# Patient Record
Sex: MALE | Race: WHITE | NOT HISPANIC OR LATINO | Employment: FULL TIME | URBAN - METROPOLITAN AREA
[De-identification: names, ages, dates, MRNs, and addresses within clinical notes are randomized per-mention and may not be internally consistent; named-entity substitution may affect disease eponyms.]

---

## 2017-01-06 ENCOUNTER — OFFICE VISIT (OUTPATIENT)
Dept: FAMILY MEDICINE | Facility: CLINIC | Age: 47
End: 2017-01-06
Payer: COMMERCIAL

## 2017-01-06 VITALS
DIASTOLIC BLOOD PRESSURE: 80 MMHG | HEART RATE: 109 BPM | WEIGHT: 315 LBS | HEIGHT: 74 IN | BODY MASS INDEX: 40.43 KG/M2 | TEMPERATURE: 99.1 F | SYSTOLIC BLOOD PRESSURE: 126 MMHG

## 2017-01-06 DIAGNOSIS — E66.01 MORBID OBESITY DUE TO EXCESS CALORIES (H): Primary | ICD-10-CM

## 2017-01-06 DIAGNOSIS — J45.20 MILD INTERMITTENT ASTHMA WITHOUT COMPLICATION: ICD-10-CM

## 2017-01-06 DIAGNOSIS — T75.3XXA MOTION SICKNESS, INITIAL ENCOUNTER: ICD-10-CM

## 2017-01-06 DIAGNOSIS — R05.9 COUGH: ICD-10-CM

## 2017-01-06 PROCEDURE — 99214 OFFICE O/P EST MOD 30 MIN: CPT | Performed by: FAMILY MEDICINE

## 2017-01-06 RX ORDER — IPRATROPIUM BROMIDE AND ALBUTEROL SULFATE 2.5; .5 MG/3ML; MG/3ML
3 SOLUTION RESPIRATORY (INHALATION) EVERY 6 HOURS PRN
Qty: 30 VIAL | Refills: 0 | Status: SHIPPED | OUTPATIENT
Start: 2017-01-06 | End: 2018-05-03

## 2017-01-06 RX ORDER — ALBUTEROL SULFATE 90 UG/1
1-2 AEROSOL, METERED RESPIRATORY (INHALATION) EVERY 4 HOURS PRN
Qty: 3 INHALER | Refills: 0 | Status: SHIPPED | OUTPATIENT
Start: 2017-01-06 | End: 2018-05-03

## 2017-01-06 RX ORDER — SCOLOPAMINE TRANSDERMAL SYSTEM 1 MG/1
PATCH, EXTENDED RELEASE TRANSDERMAL
Qty: 4 PATCH | Refills: 3 | Status: SHIPPED
Start: 2017-01-06 | End: 2018-05-03

## 2017-01-06 NOTE — MR AVS SNAPSHOT
"              After Visit Summary   1/6/2017    Gonzales Melo    MRN: [id number]           Patient Information     Date Of Birth          1970        Visit Information        Provider Department      1/6/2017 11:15 AM Kandy Duff MD [facility]        Today's Diagnoses     Morbid obesity due to excess calories (H)    -  1     Mild intermittent asthma without complication         Cough         Motion sickness, initial encounter            Follow-ups after your visit        Future tests that were ordered for you today     Open Future Orders        Priority Expected Expires Ordered    Lipid panel reflex to direct LDL Routine  12/6/2017 1/6/2017    Glucose Routine  12/6/2017 1/6/2017    Hemoglobin A1c Routine  12/6/2017 1/6/2017            Who to contact     If you have questions or need follow up information about today's clinic visit or your schedule please contact [facility] directly at [phone].  Normal or non-critical lab and imaging results will be communicated to you by MyChart, letter or phone within 4 business days after the clinic has received the results. If you do not hear from us within 7 days, please contact the clinic through [employer] or phone. If you have a critical or abnormal lab result, we will notify you by phone as soon as possible.  Submit refill requests through [employer] or call your pharmacy and they will forward the refill request to us. Please allow 3 business days for your refill to be completed.          Additional Information About Your Visit        MyChart Information     [employer] lets you send messages to your doctor, view your test results, renew your prescriptions, schedule appointments and more. To sign up, go to [email or web address] . Click on \"Log in\" on the left side of the screen, which will take you to the Welcome page. Then click on \"Sign up Now\" on the right side of the page.     You will be asked to enter the access code listed " "below, as well as some personal information. Please follow the directions to create your username and password.     Your access code is: [email or web address]  Expires: 2017 11:36 AM     Your access code will  in 90 days. If you need help or a new code, please call your [facility] or [phone].        Care EveryWhere ID     This is your Care EveryWhere ID. This could be used by other organizations to access your [place] medical records  [id number]        Your Vitals Were     Pulse Temperature Height BMI (Body Mass Index)          109 99.1  F (37.3  C) (Oral) 6' 2\" (1.88 m) 42.48 kg/m2         Blood Pressure from Last 3 Encounters:   17 126/80   09/30/15 114/73   09/12/15 126/86    Weight from Last 3 Encounters:   17 331 lb (150.141 kg)   09/12/15 326 lb (147.873 kg)   14 268 lb (121.564 kg)              We Performed the Following     Asthma Action Plan (AAP)          Today's Medication Changes          These changes are accurate as of: 17 11:36 AM.  If you have any questions, ask your nurse or doctor.               Start taking these medicines.        Dose/Directions    scopolamine 72 hr patch   Commonly known as:  TRANSDERM   Used for:  Motion sickness, initial encounter   Started by:  Kandy Duff MD        Place 1 patch onto the skin every 72 hours.  Apply to hairless area behind one ear at least 4 hours before travel.  Remove old patch and change every 3 days.   Quantity:  4 patch   Refills:  3            Where to get your medicines      These medications were sent to [facility] [id number] [place] [address] - [address] AT [facility] [address]  [address], [facility] [address]     Phone:  [phone]    - albuterol 108 (90 BASE) MCG/ACT Inhaler  - ipratropium - albuterol 0.5 mg/2.5 mg/3 mL 0.5-2.5 (3) MG/3ML neb solution  - scopolamine 72 hr patch             Primary Care Provider Office Phone # Fax #    Kandy Duff -[phone] "       [facility] [address]  [facility] [address]        Thank you!     Thank you for choosing [facility]  for your care. Our goal is always to provide you with excellent care. Hearing back from our patients is one way we can continue to improve our services. Please take a few minutes to complete the written survey that you may receive in the mail after your visit with us. Thank you!             Your Updated Medication List - Protect others around you: Learn how to safely use, store and throw away your medicines at www.disposemymeds.org.          This list is accurate as of: 1/6/17 11:36 AM.  Always use your most recent med list.                   Brand Name Dispense Instructions for use    albuterol 108 (90 BASE) MCG/ACT Inhaler    PROAIR HFA/PROVENTIL HFA/VENTOLIN HFA    3 Inhaler    Inhale 1-2 puffs into the lungs every 4 hours as needed for shortness of breath / dyspnea or wheezing       ipratropium - albuterol 0.5 mg/2.5 mg/3 mL 0.5-2.5 (3) MG/3ML neb solution    DUONEB    30 vial    Take 1 vial (3 mLs) by nebulization every 6 hours as needed for shortness of breath / dyspnea       scopolamine 72 hr patch    TRANSDERM    4 patch    Place 1 patch onto the skin every 72 hours.  Apply to hairless area behind one ear at least 4 hours before travel.  Remove old patch and change every 3 days.

## 2017-01-06 NOTE — Clinical Note
My Asthma Action Plan  Name: Gonzales Melo   Date: 1/6/2017   My doctor: Kandy Duff   My clinic: [address] [address] [phone] My Control Medicine: none  My Rescue Medicine: Albuterol        Dose:    My Asthma Severity: intermittent  Avoid your asthma triggers: See attached        GREEN ZONE   Good Control    I feel good    No cough or wheeze    Can work, sleep and play without asthma symptoms       Take your asthma control medicine every day.    1. If exercise triggers your asthma, take Albuterol 2 puffs    15 minutes before exercise or sports, and    During exercise if you have asthma symptoms  2. Spacer to use with inhaler: yes - ask us if you do not have one             YELLOW ZONE Getting Worse  I have ANY of these:    I do not feel good    Cough or wheeze    Chest feels tight    Wake up at night   1. Keep taking your Green Zone medications  2. Start taking your rescue medicine:    every 20 minutes for up to 1 hour. Then every 4 hours for 24-48 hours.  3. If you stay in the Yellow Zone for more than 12-24 hours, contact your doctor.  4. If you do not return to the Green Zone in 12-24 hours or you get worse, start taking your oral steroid medicine if prescribed by your provider.           RED ZONE Medical Alert - Get Help  I have ANY of these:    I feel awful    Medicine is not helping    Breathing getting harder    Trouble walking or talking    Nose opens wide to breathe       1. Take your rescue medicine NOW  2. If your provider has prescribed an oral steroid medicine, start taking it NOW  3. Call your doctor NOW  4. If you are still in the Red Zone after 20 minutes and you have not reached your doctor:    Take your rescue medicine again and    Call 911 or go to the emergency room right away    See your regular doctor within 2 weeks of an Emergency Room or Urgent Care visit for follow-up treatment.        The above medication may be given  at school or day care?: N/A (Adult Patient)  Child can carry and use inhaler(s) at school with approval of school nurse?:  N/A (Adult Patient)    Electronically signed by: Blaire Rothman, January 6, 2017    Annual Reminders:  Meet with Asthma Educator,  Flu Shot in the Fall, consider Pneumonia Vaccination for patients with asthma (aged 19 and older).                                  Asthma Triggers  How To Control Things That Make Your Asthma Worse    Triggers are things that make your asthma worse.  Look at the list below to help you find your triggers and what you can do about them.  You can help prevent asthma flare-ups by staying away from your triggers.      Trigger                                                          What you can do   Cigarette Smoke  Tobacco smoke can make asthma worse. Do not allow smoking in your home, car or around you.  Be sure no one smokes at a child s day care or school.  If you smoke, ask your health care provider for ways to help you quick.  Ask family members to quit too.  Ask your health care provider for a referral to Quit plan to help you quit smoking, or call 9-557-458-PLAN.     Colds, Flu, Bronchitis  These are common triggers of asthma. Wash your hands often.  Don t touch your eyes, nose or mouth.  Get a flu shot every year.     Dust Mites  These are tiny bugs that live in cloth or carpet. They are too small to see. Wash sheets and blankets in hot water every week.   Encase pillows and mattress in dust mite proof covers.  Avoid having carpet if you can. If you have carpet, vacuum weekly.   Use a dust mask and HEPA vacuum.   Pollen and Outdoor Mold  Some people are allergic to trees, grass, or weed pollen, or molds. Try to keep your windows closed.  Limit time out doors when pollen count is high.   Ask you health care provider about taking medicine during allergy season.     Animal Dander  Some people are allergic to skin flakes, urine or saliva from pets with fur or  feathers. Keep pets with fur or feathers out of your home.    If you can t keep the pet outdoors, then keep the pet out of your bedroom.  Keep the bedroom door closed.  Keep pets off cloth furniture and away from stuffed toys.     Mice, Rats, and Cockroaches  Some people are allergic to the waste from these pets.   Cover food and garbage.  Clean up spills and food crumbs.  Store grease in the refrigerator.   Keep food out of the bedroom.   Indoor Mold  This can be a trigger if your home has high moisture Fix leaking faucets, pipes, or other sources of water.   Clean moldy surfaces.  Dehumidify basement if it is damp and smelly.   Smoke, Strong Odors, and Sprays  These can reduce air quality. Stay away from strong odors and sprays, such as perfume, powder, hair spray, paints, smoke incense, paints, cleaning products, candles and new carpet.   Exercise or Sports  Some people with asthma have this trigger. Be active!  Ask you doctor about taking medicine before sports or exercise to prevent symptoms.    Warm up for 5-10 minutes before and after sports or exercise.     Other Triggers of Asthma  Cold air:  Cover your nose and mouth with a scarf.  Sometimes laughing or crying can be a trigger.  Some medicines and food can trigger asthma.

## 2017-01-06 NOTE — PROGRESS NOTES
"  SUBJECTIVE:                                                    Gonzales Melo is a 46 year old male who presents to clinic today for the following health issues:      Asthma Follow-Up    Was ACT completed today?    Yes    ACT Total Scores 1/6/2017   ACT TOTAL SCORE -   ASTHMA ER VISITS -   ASTHMA HOSPITALIZATIONS -   ACT TOTAL SCORE (Goal Greater than or Equal to 20) 23   In the past 12 months, how many times did you visit the emergency room for your asthma without being admitted to the hospital? 0   In the past 12 months, how many times were you hospitalized overnight because of your asthma? 0     Also will be going on cruise, would like suggestions on otc motion sickness medications. Also would like \"just in case\" zpak prescribed.     Amount of exercise or physical activity: moderate    Problems taking medications regularly: No    Medication side effects: none    Diet: regular (no restrictions)    Also talked about weight loss, he has been doing well with loosing weight but finds it hard to keep it off.    Problem list and histories reviewed & adjusted, as indicated.  Additional history: as documented    Patient Active Problem List   Diagnosis     Asthma, mild intermittent     CARDIOVASCULAR SCREENING; LDL GOAL LESS THAN 160     Obesity     Hypertriglyceridemia     Plantar fasciitis     Past Surgical History   Procedure Laterality Date     Colonoscopy  9/30/2015     Dr. Son [facility]     Colonoscopy N/A 9/30/2015     Procedure: COLONOSCOPY;  Surgeon: Marvin Son MD;  Location:  GI       Social History   Substance Use Topics     Smoking status: Never Smoker      Smokeless tobacco: Never Used     Alcohol Use: No     Family History   Problem Relation Age of Onset     Hypertension Maternal Grandmother      Family History Negative Mother      HEART DISEASE Brother          Current Outpatient Prescriptions   Medication Sig Dispense Refill     albuterol (PROAIR HFA/PROVENTIL HFA/VENTOLIN HFA) 108 (90 BASE) " "MCG/ACT Inhaler Inhale 1-2 puffs into the lungs every 4 hours as needed for shortness of breath / dyspnea or wheezing 3 Inhaler 0     ipratropium - albuterol 0.5 mg/2.5 mg/3 mL (DUONEB) 0.5-2.5 (3) MG/3ML neb solution Take 1 vial (3 mLs) by nebulization every 6 hours as needed for shortness of breath / dyspnea 30 vial 0     scopolamine (TRANSDERM) 72 hr patch Place 1 patch onto the skin every 72 hours.  Apply to hairless area behind one ear at least 4 hours before travel.  Remove old patch and change every 3 days. 4 patch 3     [DISCONTINUED] albuterol (PROAIR HFA, PROVENTIL HFA, VENTOLIN HFA) 108 (90 BASE) MCG/ACT inhaler Inhale 1-2 puffs into the lungs every 4 hours as needed for shortness of breath / dyspnea or wheezing 3 Inhaler 0     [DISCONTINUED] ipratropium - albuterol 0.5 mg/2.5 mg/3 mL (DUONEB) 0.5-2.5 (3) MG/3ML nebulization Take 1 vial (3 mLs) by nebulization every 6 hours as needed for shortness of breath / dyspnea 30 vial 0     Allergies   Allergen Reactions     Mucinex Other (See Comments) and Hives     lip swelling       ROS:  C: NEGATIVE for fever, chills, change in weight  R: NEGATIVE for significant cough or SOB  CV: NEGATIVE for chest pain, palpitations or peripheral edema    OBJECTIVE:                                                    /80 mmHg  Pulse 109  Temp(Src) 99.1  F (37.3  C) (Oral)  Ht 6' 2\" (1.88 m)  Wt 331 lb (150.141 kg)  BMI 42.48 kg/m2  Body mass index is 42.48 kg/(m^2).  GENERAL: healthy, alert and no distress  NECK: no adenopathy, no asymmetry, masses, or scars and thyroid normal to palpation  RESP: lungs clear to auscultation - no rales, rhonchi or wheezes  CV: regular rate and rhythm, normal S1 S2, no S3 or S4, no murmur, click or rub, no peripheral edema and peripheral pulses strong  ABDOMEN: soft, nontender, no hepatosplenomegaly, no masses and bowel sounds normal  MS: no gross musculoskeletal defects noted, no edema         ASSESSMENT/PLAN:                         "                                    1. Mild intermittent asthma without complication  Continue on   - albuterol (PROAIR HFA/PROVENTIL HFA/VENTOLIN HFA) 108 (90 BASE) MCG/ACT Inhaler; Inhale 1-2 puffs into the lungs every 4 hours as needed for shortness of breath / dyspnea or wheezing  Dispense: 3 Inhaler; Refill: 0    2. Cough    - ipratropium - albuterol 0.5 mg/2.5 mg/3 mL (DUONEB) 0.5-2.5 (3) MG/3ML neb solution; Take 1 vial (3 mLs) by nebulization every 6 hours as needed for shortness of breath / dyspnea  Dispense: 30 vial; Refill: 0    3. Morbid obesity due to excess calories (H)  Talked about diet and exericuse.  - Lipid panel reflex to direct LDL; Future  - Glucose; Future  - Hemoglobin A1c; Future    4. Motion sickness, initial encounter  Pt is oing on a cruise, will start on   - scopolamine (TRANSDERM) 72 hr patch; Place 1 patch onto the skin every 72 hours.  Apply to hairless area behind one ear at least 4 hours before travel.  Remove old patch and change every 3 days.  Dispense: 4 patch; Refill: 3        Kandy Duff MD  [facility]

## 2017-01-06 NOTE — NURSING NOTE
"Chief Complaint   Patient presents with     Asthma     follow up       Initial /80 mmHg  Pulse 109  Temp(Src) 99.1  F (37.3  C) (Oral)  Ht 6' 2\" (1.88 m)  Wt 331 lb (150.141 kg)  BMI 42.48 kg/m2 Estimated body mass index is 42.48 kg/(m^2) as calculated from the following:    Height as of this encounter: 6' 2\" (1.88 m).    Weight as of this encounter: 331 lb (150.141 kg).  BP completed using cuff size: large.Blaire DIXON MA      "

## 2017-01-07 ASSESSMENT — ASTHMA QUESTIONNAIRES: ACT_TOTALSCORE: 23

## 2017-01-12 ENCOUNTER — HOSPITAL ENCOUNTER (OUTPATIENT)
Dept: LAB | Facility: CLINIC | Age: 47
Discharge: HOME | End: 2017-01-12
Attending: FAMILY MEDICINE | Admitting: FAMILY MEDICINE
Payer: COMMERCIAL

## 2017-01-12 DIAGNOSIS — E66.01 MORBID OBESITY DUE TO EXCESS CALORIES (H): ICD-10-CM

## 2017-01-12 LAB
CHOLEST SERPL-MCNC: 259 MG/DL
GLUCOSE SERPL-MCNC: 102 MG/DL (ref 70–99)
HBA1C MFR BLD: 6 % (ref 4.3–6)
HDLC SERPL-MCNC: 39 MG/DL
LDLC SERPL CALC-MCNC: 173 MG/DL
NONHDLC SERPL-MCNC: 220 MG/DL
TRIGL SERPL-MCNC: 233 MG/DL

## 2017-01-12 PROCEDURE — 80061 LIPID PANEL: CPT | Performed by: FAMILY MEDICINE

## 2017-01-12 PROCEDURE — 83036 HEMOGLOBIN GLYCOSYLATED A1C: CPT | Performed by: FAMILY MEDICINE

## 2017-01-12 PROCEDURE — 82947 ASSAY GLUCOSE BLOOD QUANT: CPT | Performed by: FAMILY MEDICINE

## 2017-12-22 ENCOUNTER — HOSPITAL ENCOUNTER (EMERGENCY)
Facility: CLINIC | Age: 47
Discharge: HOME | End: 2017-12-22
Attending: EMERGENCY MEDICINE | Admitting: EMERGENCY MEDICINE
Payer: COMMERCIAL

## 2017-12-22 ENCOUNTER — APPOINTMENT (OUTPATIENT)
Dept: GENERAL RADIOLOGY | Facility: CLINIC | Age: 47
End: 2017-12-22
Attending: EMERGENCY MEDICINE
Payer: COMMERCIAL

## 2017-12-22 VITALS
HEART RATE: 118 BPM | SYSTOLIC BLOOD PRESSURE: 123 MMHG | DIASTOLIC BLOOD PRESSURE: 88 MMHG | TEMPERATURE: 99.4 F | OXYGEN SATURATION: 96 %

## 2017-12-22 DIAGNOSIS — S93.402A SPRAIN OF LEFT ANKLE, UNSPECIFIED LIGAMENT, INITIAL ENCOUNTER: ICD-10-CM

## 2017-12-22 PROCEDURE — 73610 X-RAY EXAM OF ANKLE: CPT | Mod: LT

## 2017-12-22 PROCEDURE — 99283 EMERGENCY DEPT VISIT LOW MDM: CPT

## 2017-12-22 NOTE — ED AVS SNAPSHOT
[facility] Emergency Department    [address]    [facility] [address]    Phone:  [phone]    Fax:  [phone]                                       Gonzales Melo   MRN: [id number]    Department:  [facility] Emergency Department   Date of Visit:  12/22/2017           After Visit Summary Signature Page     I have received my discharge instructions, and my questions have been answered. I have discussed any challenges I see with this plan with the nurse or doctor.    ..........................................................................................................................................  Patient/Patient Representative Signature      ..........................................................................................................................................  Patient Representative Print Name and Relationship to Patient    ..................................................               ................................................  Date                                            Time    ..........................................................................................................................................  Reviewed by Signature/Title    ...................................................              ..............................................  Date                                                            Time

## 2017-12-22 NOTE — ED PROVIDER NOTES
History     Chief Complaint:    Ankle Pain       HPI   Gonzales Melo is a 47 year old male who presents with left ankle pain.  He works as a nurse here in the ER.  He was working tonight when he was walking and tripped.  He rolled his ankle in with an inversion type mechanism.  He felt and heard a pop in his ankle.  Since then he has had significant pain in the left lateral ankle and is noted swelling getting rested.  No other jury sustained during the fall..    Allergies:     Allergies   Allergen Reactions     Mucinex Other (See Comments) and Hives     lip swelling       Medications:         albuterol (PROAIR HFA/PROVENTIL HFA/VENTOLIN HFA) 108 (90 BASE) MCG/ACT Inhaler, Inhale 1-2 puffs into the lungs every 4 hours as needed for shortness of breath / dyspnea or wheezing, Disp: 3 Inhaler, Rfl: 0     ipratropium - albuterol 0.5 mg/2.5 mg/3 mL (DUONEB) 0.5-2.5 (3) MG/3ML neb solution, Take 1 vial (3 mLs) by nebulization every 6 hours as needed for shortness of breath / dyspnea, Disp: 30 vial, Rfl: 0     scopolamine (TRANSDERM) 72 hr patch, Place 1 patch onto the skin every 72 hours.  Apply to hairless area behind one ear at least 4 hours before travel.  Remove old patch and change every 3 days., Disp: 4 patch, Rfl: 3    Past Medical History:    Past Medical History:   Diagnosis Date     Asthma, mild intermittent      Obesity 1/30/2014     Plantar fasciitis 1/30/2014       Patient Active Problem List    Diagnosis Date Noted     Obesity 01/30/2014     Priority: Medium     Hypertriglyceridemia 01/30/2014     Priority: Medium     Plantar fasciitis 01/30/2014     Priority: Medium     CARDIOVASCULAR SCREENING; LDL GOAL LESS THAN 160 10/31/2010     Priority: Medium     Asthma, mild intermittent      Priority: Medium        Past Surgical History:    Past Surgical History:   Procedure Laterality Date     COLONOSCOPY  9/30/2015    Dr. Son [facility]     COLONOSCOPY N/A 9/30/2015    Procedure: COLONOSCOPY;  Surgeon: Adelaida  Marvin Bustamante MD;  [facility]: [facility]        Family History:    family history includes Family History Negative in his mother; HEART DISEASE in his brother; Hypertension in his maternal grandmother.    Social History:   reports that he has never smoked. He has never used smokeless tobacco. He reports that he does not drink alcohol or use illicit drugs.    PCP: Kandy Duff     Review of Systems  Negative except as noted above    Physical Exam   Patient Vitals for the past 24 hrs:   BP Temp Temp src Pulse Heart Rate SpO2   12/22/17 0243 123/88 99.4  F (37.4  C) Oral 118 118 96 %        Physical Exam  Constitutional:  Appears well-developed and well-nourished. Alert. Conversant. Non toxic.       HENT:   Head: Atraumatic.   Nose: Nose unremarkable   Mouth/Throat: Oropharynx is clear and moist.   Eyes: Conjunctivae normal. EOM are grossly normal. Pupils are equal, round, and reactive to light. No scleral icterus.   Neck: Normal range of motion. No tracheal deviation present.   Cardiovascular: Normal rate, regular rhythm. Normal left DP and PT artery pulses.  Pulmonary/Chest: Effort normal. No stridor. No respiratory distress.  Musculoskeletal: Unremarkable except for left ankle. No other abnormality noted.  He has fairly significant swelling localized over the lateral malleolus of his left ankle.  He is tender there.  No tenderness proximally over the fibula or near the knee.  No tenderness over the heel, calcaneus, or the base of the metatarsals.  Neurological: Alert and oriented to person, place, and time. Normal strength. CN II-VII intact. No sensory deficit. GCS eye subscore is 4. GCS verbal subscore is 5. GCS motor subscore is 6. Normal coordination . Intact distal sensory and motor function.  Skin: Skin is warm and dry. No rash noted. No pallor. Normal capillary refill.  Psychiatric:  normal mood and affect.    Emergency Department Course       Emergency Department Data:  Results for orders placed or performed during  the hospital encounter of 12/22/17 (from the past 24 hour(s))   Ankle XR, G/E 3 views, left    Narrative    LEFT ANKLE 3 VIEWS  12/22/2017 3:11 AM     HISTORY: Inversion injury. Ankle pain.    COMPARISON: 8/17/2006.      Impression    IMPRESSION:  1. No visualized acute fracture or malalignment of the left ankle.  2. Mild to moderate degenerative changes in the left tibiotalar joint.  3. Ossific structures at the inferior aspects of the medial and  lateral malleoli are again noted and likely relate to old avulsion  injuries.    PAMELLA DREW MD         Interventions:  Declines pain medications  Declines stirrup splint or crutches  Excepts Ace wrap for his ankle    Emergency Department Course:  Past medical records, nursing notes, and vitals reviewed.  I performed an exam of the patient and obtained history, as documented above.    I rechecked the patient. Findings and plan explained to the Patient . Patient was discharge to home.    Impression & Plan       Medical Decision Making:  Gonzales Melo is a 47 year old male who presents for evaluation of ankle pain.  Signs and symptoms are consistent with an ankle sprain. There are no signs of fracture on radiograph.  The patients neurovascular status is normal. Knee exam is normal. I don't think this is a Maisonneuve injury or a intraosseous ligament injury based on the location of tenderness.  A head to toe trauma exam is otherwise negative; the likelihood of other serious sequelae of trauma (spine, head, chest, abdomen, other extremities, pelvis) is low.      Plan is for protected weightbearing, RICE treatment with ice 15 minutes every 3 hours, and an Aircast.  Patient will advance weightbearing and follow-up in 2-3 days.  They will begin gentle ROM exercises of the ankle including PF,DF, alphabet exercises    Critical Care time:  was 0 minutes for this patient excluding procedures.    Diagnosis:    ICD-10-CM    1. Sprain of left ankle, unspecified ligament,  initial encounter S93.402A         Discharge Medications:  Declined by patient.  Use over-the-counter ibuprofen or Tylenol as needed    12/22/2017   Shiarz Vaz, *      Shiraz Vaz MD  12/22/17 0323

## 2017-12-22 NOTE — LETTER
December 22, 2017      To Whom It May Concern:      Gonazles Melo was seen in our Emergency Department today, 12/22/17.  I expect his condition to improve over the next 2-3 days.  He may return to work/school when improved.    Sincerely,        Shiraz Vaz MD

## 2017-12-22 NOTE — ED AVS SNAPSHOT
[facility]    [address]    [facility] [address]    Phone:  [phone]    Fax:  [phone]                                       Gonzales Melo   MRN: [id number]    Department:  [facility]   Date of Visit:  12/22/2017           Patient Information     Date Of Birth          1970        Your diagnoses for this visit were:     Sprain of left ankle, unspecified ligament, initial encounter        You were seen by Shiraz Vaz MD.      Follow-up Information     Follow up with [facility] In 5 days.    Why:  As needed    Contact information:    [address], [facility] [address]  [facility] [address]  [phone]          Discharge Instructions       Discharge Instructions  Ankle Sprain    An ankle sprain is a stretching or tearing of a ligament around your ankle joint. In most cases, we recommend resting the ankle for about 3 days, followed by return to activity. Some severe sprains need longer periods of rest, or can require a cast or boot to immobilize them.    Return to the Emergency Department if:    Your pain is much worse, or if there is pain in a new area.    Your foot or leg becomes pale, cool, blue, or numb or tingling.    There is anything concerning to you about how your ankle looks.    Any splint or device is feeling too tight, causing pain, or rubbing into your skin.    Follow-up with your doctor:    As recommended by your emergency physician.    If your ankle is not back to normal within about 1 week.    If you are involved in significant athletic activities.        Treatment:    Apply ice your injured area for 15 minutes at a time, at least 3 times a day for the first 1-2 days. Use a cloth between the ice bag and your skin to prevent frostbite.     Do not sleep with an ice pack or heating pad on, since this can cause burns or skin injury.    Raise the injured area above the level of  your heart as much as possible in the first 1-2 days.    Pain medications -- You may take a pain medication such as Tylenol  (acetaminophen), Advil , Nuprin  (ibuprofen) or Aleve  (naproxen).  If you have been given a narcotic such as Vicodin  (hydrocodone with acetaminophen), Percocet  (oxycodone with acetaminophen), or codeine, do not drive for four hours after you have taken it. If the narcotic contains Tylenol  (acetaminophen), do not take Tylenol  with it. All narcotics will cause constipation, so eat a high fiber diet.      Splint. We often give a stirrup-shaped ankle splint to support your ankle and prevent it from turning again. Wear this all the time for the first 3-5 days, and then as directed by your doctor.    Crutches. If you can t put wait on the ankle without a lot of pain, we recommend crutches. You can put as much weight on the ankle as possible without severe pain.     Compression. An elastic bandage (Ace  wrap) can help with pain and swelling. Remove this at least twice a day, and leave it off for several hours if you develop swelling of the foot.   If you were given a prescription for medicine here today, be sure to read all of the information (including the package insert) that comes with your prescription.  This will include important information about the medicine, its side effects, and any warnings that you need to know about.  The pharmacist who fills the prescription can provide more information and answer questions you may have about the medicine.  If you have questions or concerns that the pharmacist cannot address, please call or return to the Emergency Department.  Opioid Medication Information    Pain medications are among the most commonly prescribed medicines, so we are including this information for all our patients. If you did not receive pain medication or get a prescription for pain medicine, you can ignore it.     You may have been given a prescription for an opioid (narcotic)  pain medicine and/or have received a pain medicine while here in the Emergency Department. These medicines can make you drowsy or impaired. You must not drive, operate dangerous equipment, or engage in any other dangerous activities while taking these medications. If you drive while taking these medications, you could be arrested for DUI, or driving under the influence. Do not drink any alcohol while you are taking these medications.     Opioid pain medications can cause addiction. If you have a history of chemical dependency of any type, you are at a higher risk of becoming addicted to pain medications.  Only take these prescribed medications to treat your pain when all other options have been tried. Take it for as short a time and as few doses as possible. Store your pain pills in a secure place, as they are frequently stolen and provide a dangerous opportunity for children or visitors in your house to start abusing these powerful medications. We will not replace any lost or stolen medicine.  As soon as your pain is better, you should flush all your remaining medication.     Many prescription pain medications contain Tylenol  (acetaminophen), including Vicodin , Tylenol #3 , Norco , Lortab , and Percocet .  You should not take any extra pills of Tylenol  if you are using these prescription medications or you can get very sick.  Do not ever take more than 3000 mg of acetaminophen in any 24 hour period.    All opioids tend to cause constipation. Drink plenty of water and eat foods that have a lot of fiber, such as fruits, vegetables, prune juice, apple juice and high fiber cereal.  Take a laxative if you don t move your bowels at least every other day. Miralax , Milk of Magnesia, Colace , or Senna  can be used to keep you regular.      Remember that you can always come back to the Emergency Department if you are not able to see your regular doctor in the amount of time listed above, if you get any new symptoms, or  if there is anything that worries you.          24 Hour Appointment Hotline       To make an appointment at any [facility], call [phone] ([phone]). If you don't have a family doctor or clinic, we will help you find one. [place] clinics are conveniently located to serve the needs of you and your family.             Review of your medicines      Our records show that you are taking the medicines listed below. If these are incorrect, please call your family doctor or clinic.        Dose / Directions Last dose taken    albuterol 108 (90 BASE) MCG/ACT Inhaler   Commonly known as:  PROAIR HFA/PROVENTIL HFA/VENTOLIN HFA   Dose:  1-2 puff   Quantity:  3 Inhaler        Inhale 1-2 puffs into the lungs every 4 hours as needed for shortness of breath / dyspnea or wheezing   Refills:  0        ipratropium - albuterol 0.5 mg/2.5 mg/3 mL 0.5-2.5 (3) MG/3ML neb solution   Commonly known as:  DUONEB   Dose:  3 mL   Quantity:  30 vial        Take 1 vial (3 mLs) by nebulization every 6 hours as needed for shortness of breath / dyspnea   Refills:  0        scopolamine 72 hr patch   Commonly known as:  TRANSDERM   Quantity:  4 patch        Place 1 patch onto the skin every 72 hours.  Apply to hairless area behind one ear at least 4 hours before travel.  Remove old patch and change every 3 days.   Refills:  3                Procedures and tests performed during your visit     Ankle XR, G/E 3 views, left      Orders Needing Specimen Collection     None      Pending Results     No orders found from 12/20/2017 to 12/23/2017.            Pending Culture Results     No orders found from 12/20/2017 to 12/23/2017.            Pending Results Instructions     If you had any lab results that were not finalized at the time of your Discharge, you can call the ED Lab Result RN at [phone]. You will be contacted by this team for any positive Lab results or changes in treatment. The nurses are available 7 days a week from 10A to  6:30P.  You can leave a message 24 hours per day and they will return your call.        Test Results From Your Hospital Stay        12/22/2017  3:20 AM      Narrative     LEFT ANKLE 3 VIEWS  12/22/2017 3:11 AM     HISTORY: Inversion injury. Ankle pain.    COMPARISON: 8/17/2006.        Impression     IMPRESSION:  1. No visualized acute fracture or malalignment of the left ankle.  2. Mild to moderate degenerative changes in the left tibiotalar joint.  3. Ossific structures at the inferior aspects of the medial and  lateral malleoli are again noted and likely relate to old avulsion  injuries.    PAMELLA DREW MD                Clinical Quality Measure: Blood Pressure Screening     Your blood pressure was checked while you were in the emergency department today. The last reading we obtained was  BP: 123/88 . Please read the guidelines below about what these numbers mean and what you should do about them.  If your systolic blood pressure (the top number) is less than 120 and your diastolic blood pressure (the bottom number) is less than 80, then your blood pressure is normal. There is nothing more that you need to do about it.  If your systolic blood pressure (the top number) is 120-139 or your diastolic blood pressure (the bottom number) is 80-89, your blood pressure may be higher than it should be. You should have your blood pressure rechecked within a year by a primary care provider.  If your systolic blood pressure (the top number) is 140 or greater or your diastolic blood pressure (the bottom number) is 90 or greater, you may have high blood pressure. High blood pressure is treatable, but if left untreated over time it can put you at risk for heart attack, stroke, or kidney failure. You should have your blood pressure rechecked by a primary care provider within the next 4 weeks.  If your provider in the emergency department today gave you specific instructions to follow-up with your doctor or provider even sooner  "than that, you should follow that instruction and not wait for up to 4 weeks for your follow-up visit.        Thank you for choosing [facility]       Thank you for choosing [facility] for your care. Our goal is always to provide you with excellent care. Hearing back from our patients is one way we can continue to improve our services. Please take a few minutes to complete the written survey that you may receive in the mail after you visit with us. Thank you!        For[employer]une[employer]Tagorize Information     Tag[employer]By lets you send messages to your doctor, view your test results, renew your prescriptions, schedule appointments and more. To sign up, go to [email or web address] . Click on \"Log in\" on the left side of the screen, which will take you to the Welcome page. Then click on \"Sign up Now\" on the right side of the page.     You will be asked to enter the access code listed below, as well as some personal information. Please follow the directions to create your username and password.     Your access code is: [email or web address]  Expires: 3/22/2018  3:20 AM     Your access code will  in 90 days. If you need help or a new code, please call your [facility] clinic or [phone].        Care EveryWhere ID     This is your Care EveryWhere ID. This could be used by other organizations to access your [facility] medical records  [id number]        Equal Access to Services     JULIETA GONZALEZ : Hadii ирина tanner Somisael, waaxda luqadaha, qaybta kaalmada elizabeth, river wheatley. So [facility] [phone].    ATENCIÓN: Si habla español, tiene a marvin disposición servicios gratuitos de asistencia lingüística. Donte al [phone].    We comply with applicable federal civil rights laws and [place] laws. We do not discriminate on the basis of race, color, national origin, age, disability, sex, sexual orientation, or gender identity.            After Visit Summary       This is your record. Keep this with you and show to your " community pharmacist(s) and doctor(s) at your next visit.

## 2017-12-22 NOTE — ED NOTES
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 Trimble a \"pop and snap\" and having pain with ambulation. ABCs intact. A&OX3. Moderate swelling to lateral ankle. CMS intact.   "

## 2017-12-28 ENCOUNTER — TELEPHONE (OUTPATIENT)
Dept: FAMILY MEDICINE | Facility: CLINIC | Age: 47
End: 2017-12-28

## 2017-12-28 NOTE — TELEPHONE ENCOUNTER
With his hx of asthma, and bronchitis, I recommend JAVIER Duff MD  [facility]  [phone]

## 2017-12-28 NOTE — TELEPHONE ENCOUNTER
Dr. Duff, please advise if you would double book a phone or [facility], [facility] ?   Franck Plunkett RN

## 2017-12-28 NOTE — TELEPHONE ENCOUNTER
Reason for call:  Other   Patient called regarding (reason for call): prescription  Additional comments: patient requesting a prescription for Prednisone for a chest cold.  Patient states he also has asthma.  Patient is requesting Dr Duff to call in a prescription to [facility] pharmacy on [address] in [place], [place].      Phone number to reach patient:  Cell number on file:    Telephone Information:   Mobile [phone]       Best Time:  anytime    Can we leave a detailed message on this number?  YES

## 2018-05-03 ENCOUNTER — OFFICE VISIT (OUTPATIENT)
Dept: FAMILY MEDICINE | Facility: CLINIC | Age: 48
End: 2018-05-03
Payer: COMMERCIAL

## 2018-05-03 VITALS
RESPIRATION RATE: 18 BRPM | BODY MASS INDEX: 45.45 KG/M2 | WEIGHT: 315 LBS | OXYGEN SATURATION: 95 % | SYSTOLIC BLOOD PRESSURE: 118 MMHG | TEMPERATURE: 99.7 F | HEART RATE: 100 BPM | DIASTOLIC BLOOD PRESSURE: 78 MMHG

## 2018-05-03 DIAGNOSIS — J01.00 ACUTE NON-RECURRENT MAXILLARY SINUSITIS: ICD-10-CM

## 2018-05-03 DIAGNOSIS — J45.21 MILD INTERMITTENT ASTHMA WITH EXACERBATION: Primary | ICD-10-CM

## 2018-05-03 DIAGNOSIS — H69.93 DYSFUNCTION OF BOTH EUSTACHIAN TUBES: ICD-10-CM

## 2018-05-03 PROCEDURE — 99213 OFFICE O/P EST LOW 20 MIN: CPT | Performed by: PHYSICIAN ASSISTANT

## 2018-05-03 RX ORDER — METHYLPREDNISOLONE 4 MG
TABLET, DOSE PACK ORAL
Qty: 21 TABLET | Refills: 0 | Status: SHIPPED | OUTPATIENT
Start: 2018-05-03 | End: 2018-11-30

## 2018-05-03 RX ORDER — ALBUTEROL SULFATE 90 UG/1
1-2 AEROSOL, METERED RESPIRATORY (INHALATION) EVERY 4 HOURS PRN
Qty: 3 INHALER | Refills: 3 | Status: SHIPPED | OUTPATIENT
Start: 2018-05-03 | End: 2019-03-12

## 2018-05-03 RX ORDER — FLUTICASONE PROPIONATE 50 MCG
1-2 SPRAY, SUSPENSION (ML) NASAL DAILY
Qty: 1 BOTTLE | Refills: 11 | Status: SHIPPED | OUTPATIENT
Start: 2018-05-03 | End: 2021-12-30

## 2018-05-03 RX ORDER — IPRATROPIUM BROMIDE AND ALBUTEROL SULFATE 2.5; .5 MG/3ML; MG/3ML
3 SOLUTION RESPIRATORY (INHALATION) EVERY 6 HOURS PRN
Qty: 30 VIAL | Refills: 1 | Status: SHIPPED | OUTPATIENT
Start: 2018-05-03 | End: 2018-11-30

## 2018-05-03 NOTE — PROGRESS NOTES
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          Problem list and histories reviewed & adjusted, as indicated.  Additional history: as documented    Patient Active Problem List   Diagnosis     Asthma, mild intermittent     CARDIOVASCULAR SCREENING; LDL GOAL LESS THAN 160     Obesity     Hypertriglyceridemia     Plantar fasciitis     Past Surgical History:   Procedure Laterality Date     COLONOSCOPY  9/30/2015    Dr. Son [facility]     COLONOSCOPY N/A 9/30/2015    Procedure: COLONOSCOPY;  Surgeon: Marvin Son MD;  Location:  GI       Social History   Substance Use Topics     Smoking status: Never Smoker     Smokeless tobacco: Never Used     Alcohol use No     Family History   Problem Relation Age of Onset     Family History Negative Mother      Hypertension Maternal Grandmother      HEART DISEASE Brother            Reviewed and updated as needed this visit by clinical staff  Tobacco  Allergies  Meds  Med Hx  Surg Hx  Fam Hx  Soc Hx      Reviewed and updated as needed this visit by Provider         ROS:  Constitutional, HEENT, cardiovascular, pulmonary, gi and gu systems are negative, except as otherwise noted.    OBJECTIVE:     /78 (BP Location: Right arm, Patient Position: Chair, Cuff Size:  Adult Large)  Pulse 100  Temp 99.7  F (37.6  C) (Oral)  Resp 18  Wt (!) 354 lb (160.6 kg)  SpO2 95%  BMI 45.45 kg/m2  Body mass index is 45.45 kg/(m^2).  GENERAL APPEARANCE: healthy, alert and no distress  HENT: TM fluid bilateral, nasal mucosa edematous without rhinorrhea, oral mucous membranes moist, oropharynx clear and maxillary sinus tenderness right  RESP: expiratory wheezes R mid posterior and R lower posterior  CV: regular rates and rhythm, normal S1 S2, no S3 or S4 and no murmur, click or rub  SKIN: no suspicious lesions or rashes        ASSESSMENT/PLAN:             1. Mild intermittent asthma with exacerbation  Uses nebs currently.  Call if symptoms persist or f/u in clinic.   - albuterol (PROAIR HFA/PROVENTIL HFA/VENTOLIN HFA) 108 (90 Base) MCG/ACT Inhaler; Inhale 1-2 puffs into the lungs every 4 hours as needed for shortness of breath / dyspnea or wheezing  Dispense: 3 Inhaler; Refill: 3  - ipratropium - albuterol 0.5 mg/2.5 mg/3 mL (DUONEB) 0.5-2.5 (3) MG/3ML neb solution; Take 1 vial (3 mLs) by nebulization every 6 hours as needed for shortness of breath / dyspnea  Dispense: 30 vial; Refill: 1  - methylPREDNISolone (MEDROL DOSEPAK) 4 MG tablet; Follow package instructions  Dispense: 21 tablet; Refill: 0    2. Acute non-recurrent maxillary sinusitis  Use flonase as well.   - amoxicillin-clavulanate (AUGMENTIN) 875-125 MG per tablet; Take 1 tablet by mouth 2 times daily  Dispense: 20 tablet; Refill: 0    3. Dysfunction of both eustachian tubes    - fluticasone (FLONASE) 50 MCG/ACT spray; Spray 1-2 sprays into both nostrils daily  Dispense: 1 Bottle; Refill: 11    Pt going to [place] in a few weeks, call or return to clinic if symptoms persist.     Jesica Wong PA-C  [facility]

## 2018-05-03 NOTE — MR AVS SNAPSHOT
"              After Visit Summary   5/3/2018    Gonzales Melo    MRN: [id number]           Patient Information     Date Of Birth          1970        Visit Information        Provider Department      5/3/2018 6:00 PM Jesica Wong PA-C [facility]        Today's Diagnoses     Need for prophylactic vaccination with tetanus-diphtheria (TD)    -  1       Follow-ups after your visit        Who to contact     If you have questions or need follow up information about today's clinic visit or your schedule please contact [facility] directly at [phone].  Normal or non-critical lab and imaging results will be communicated to you by [employer], letter or phone within 4 business days after the clinic has received the results. If you do not hear from us within 7 days, please contact the clinic through [employer] or phone. If you have a critical or abnormal lab result, we will notify you by phone as soon as possible.  Submit refill requests through [employer] or call your pharmacy and they will forward the refill request to us. Please allow 3 business days for your refill to be completed.          Additional Information About Your Visit        MyChart Information     [employer] lets you send messages to your doctor, view your test results, renew your prescriptions, schedule appointments and more. To sign up, go to [email or web address] . Click on \"Log in\" on the left side of the screen, which will take you to the Welcome page. Then click on \"Sign up Now\" on the right side of the page.     You will be asked to enter the access code listed below, as well as some personal information. Please follow the directions to create your username and password.     Your access code is: [email or web address]  Expires: 2018  6:08 PM     Your access code will  in 90 days. If you need help or a new code, please call your [facility] or [phone].        Care EveryWhere ID     This is your Care " EveryWhere ID. This could be used by other organizations to access your [facility] medical records  [id number]        Your Vitals Were     Pulse Temperature Respirations Pulse Oximetry BMI (Body Mass Index)       100 99.7  F (37.6  C) (Oral) 18 95% 45.45 kg/m2        Blood Pressure from Last 3 Encounters:   05/03/18 118/78   12/22/17 123/88   01/06/17 126/80    Weight from Last 3 Encounters:   05/03/18 (!) 354 lb (160.6 kg)   01/06/17 (!) 331 lb (150.1 kg)   09/12/15 (!) 326 lb (147.9 kg)              Today, you had the following     No orders found for display       Primary Care Provider Office Phone # Fax #    Kandy Duff [phone] [address] [facility] [address]        Equal Access to Services     [facility]: Hadii ирина tanner Somisael, waaxda luqadaha, qaybta kaalmada adeanaisyachrista, river vaca . So [facility] [phone].    ATENCIÓN: Si habla español, tiene a marvin disposición servicios gratuitos de asistencia lingüística. Llame al [phone].    We comply with applicable federal civil rights laws and [place] laws. We do not discriminate on the basis of race, color, national origin, age, disability, sex, sexual orientation, or gender identity.            Thank you!     Thank you for choosing [facility]  for your care. Our goal is always to provide you with excellent care. Hearing back from our patients is one way we can continue to improve our services. Please take a few minutes to complete the written survey that you may receive in the mail after your visit with us. Thank you!             Your Updated Medication List - Protect others around you: Learn how to safely use, store and throw away your medicines at www.disposemymeds.org.          This list is accurate as of 5/3/18  6:08 PM.  Always use your most recent med list.                   Brand Name Dispense Instructions for use Diagnosis    albuterol 108 (90 Base) MCG/ACT Inhaler     PROAIR HFA/PROVENTIL HFA/VENTOLIN HFA    3 Inhaler    Inhale 1-2 puffs into the lungs every 4 hours as needed for shortness of breath / dyspnea or wheezing    Mild intermittent asthma without complication       ipratropium - albuterol 0.5 mg/2.5 mg/3 mL 0.5-2.5 (3) MG/3ML neb solution    DUONEB    30 vial    Take 1 vial (3 mLs) by nebulization every 6 hours as needed for shortness of breath / dyspnea    Cough

## 2018-05-05 ASSESSMENT — ASTHMA QUESTIONNAIRES: ACT_TOTALSCORE: 14

## 2018-11-30 ENCOUNTER — OFFICE VISIT (OUTPATIENT)
Dept: FAMILY MEDICINE | Facility: CLINIC | Age: 48
End: 2018-11-30
Payer: COMMERCIAL

## 2018-11-30 VITALS
DIASTOLIC BLOOD PRESSURE: 81 MMHG | BODY MASS INDEX: 40.43 KG/M2 | WEIGHT: 315 LBS | TEMPERATURE: 98.6 F | HEIGHT: 74 IN | RESPIRATION RATE: 18 BRPM | OXYGEN SATURATION: 96 % | HEART RATE: 109 BPM | SYSTOLIC BLOOD PRESSURE: 122 MMHG

## 2018-11-30 DIAGNOSIS — J01.00 ACUTE NON-RECURRENT MAXILLARY SINUSITIS: ICD-10-CM

## 2018-11-30 DIAGNOSIS — J45.21 MILD INTERMITTENT ASTHMA WITH EXACERBATION: Primary | ICD-10-CM

## 2018-11-30 DIAGNOSIS — R06.83 SNORING: ICD-10-CM

## 2018-11-30 PROBLEM — E66.01 MORBID OBESITY (H): Status: ACTIVE | Noted: 2018-11-30

## 2018-11-30 PROCEDURE — 99214 OFFICE O/P EST MOD 30 MIN: CPT | Performed by: PHYSICIAN ASSISTANT

## 2018-11-30 RX ORDER — IPRATROPIUM BROMIDE AND ALBUTEROL SULFATE 2.5; .5 MG/3ML; MG/3ML
3 SOLUTION RESPIRATORY (INHALATION) EVERY 6 HOURS PRN
Qty: 30 VIAL | Refills: 1 | Status: SHIPPED | OUTPATIENT
Start: 2018-11-30 | End: 2019-03-12

## 2018-11-30 RX ORDER — PREDNISONE 20 MG/1
40 TABLET ORAL DAILY
Qty: 10 TABLET | Refills: 0 | Status: SHIPPED | OUTPATIENT
Start: 2018-11-30 | End: 2019-03-12

## 2018-11-30 NOTE — PROGRESS NOTES
"  SUBJECTIVE:   Gonzales Melo is a 48 year old male who presents to clinic today for the following health issues:    HPI  Acute Illness   Acute illness concerns: sinuses  Onset: week    Fever: no     Chills/Sweats: YES    Headache (location?): YES    Sinus Pressure:YES    Conjunctivitis:  no    Ear Pain: YES-     Rhinorrhea: YES    Congestion: YES    Sore Throat: YES     Cough: YES    Wheeze: YES    Decreased Appetite: YES    Nausea: no     Vomiting: no     Diarrhea:  no     Dysuria/Freq.: no     Fatigue/Achiness: YES    Sick/Strep Exposure: no      Therapies Tried and outcome:     Problem list and histories reviewed & adjusted, as indicated.  Additional history: none        Patient wondering if he should have a sleep study. Wife states he snores very loudly and stops breathing at night.   States he is constantly tired.     ROS:  Constitutional, HEENT, cardiovascular, pulmonary, gi and gu systems are negative, except as otherwise noted.    OBJECTIVE:     /81 (BP Location: Right arm, Patient Position: Chair, Cuff Size: Adult Large)  Pulse 109  Temp 98.6  F (37  C) (Oral)  Resp 18  Ht 6' 2\" (1.88 m)  Wt (!) 345 lb (156.5 kg)  SpO2 96%  BMI 44.3 kg/m2  Body mass index is 44.3 kg/(m^2).  GENERAL APPEARANCE: healthy, alert and no distress  HENT: ear canals and TM's normal, nasal mucosa edematous without rhinorrhea, oral mucous membranes moist, oropharynx clear and maxillary sinus tenderness bilateral  RESP: inspiratory wheezes R lower posterior  CV: regular rates and rhythm, normal S1 S2, no S3 or S4 and no murmur, click or rub  PSYCH: mentation appears normal and affect normal/bright        ASSESSMENT/PLAN:             1. Mild intermittent asthma with exacerbation  Continue albuterol as needed   - ipratropium - albuterol 0.5 mg/2.5 mg/3 mL (DUONEB) 0.5-2.5 (3) MG/3ML neb solution; Take 1 vial (3 mLs) by nebulization every 6 hours as needed for shortness of breath / dyspnea  Dispense: 30 vial; Refill: " 1  - predniSONE (DELTASONE) 20 MG tablet; Take 2 tablets (40 mg) by mouth daily for 5 days  Dispense: 10 tablet; Refill: 0    2. Acute non-recurrent maxillary sinusitis  Nasal saline.   - amoxicillin-clavulanate (AUGMENTIN) 875-125 MG tablet; Take 1 tablet by mouth 2 times daily  Dispense: 20 tablet; Refill: 0    3. Snoring  Referral.   - SLEEP EVALUATION & MANAGEMENT REFERRAL - ADULT -[facility] [place]  [phone] (Age 18 and up); Future        Jesica Wong PA-C  [facility]

## 2018-11-30 NOTE — MR AVS SNAPSHOT
"              After Visit Summary   2018    Gonzales Melo    MRN: [id number]           Patient Information     Date Of Birth          1970        Visit Information        Provider Department      2018 3:45 PM Jesica Wong PA-C [facility]         Follow-ups after your visit        Who to contact     If you have questions or need follow up information about today's clinic visit or your schedule please contact [facility] directly at [phone].  Normal or non-critical lab and imaging results will be communicated to you by MyChart, letter or phone within 4 business days after the clinic has received the results. If you do not hear from us within 7 days, please contact the clinic through [employer] or phone. If you have a critical or abnormal lab result, we will notify you by phone as soon as possible.  Submit refill requests through [employer] or call your pharmacy and they will forward the refill request to us. Please allow 3 business days for your refill to be completed.          Additional Information About Your Visit        [facility] Information     [employer] lets you send messages to your doctor, view your test results, renew your prescriptions, schedule appointments and more. To sign up, go to [email or web address] . Click on \"Log in\" on the left side of the screen, which will take you to the Welcome page. Then click on \"Sign up Now\" on the right side of the page.     You will be asked to enter the access code listed below, as well as some personal information. Please follow the directions to create your username and password.     Your access code is: [email or web address]  Expires: 2019  3:58 PM     Your access code will  in 90 days. If you need help or a new code, please call your [facility] or [phone].        Care EveryWhere ID     This is your Care EveryWhere ID. This could be used by other organizations to access your [place] medical " "records  [id number]        Your Vitals Were     Height BMI (Body Mass Index)                6' 2\" (1.88 m) 45.45 kg/m2           Blood Pressure from Last 3 Encounters:   05/03/18 118/78   12/22/17 123/88   01/06/17 126/80    Weight from Last 3 Encounters:   05/03/18 (!) 354 lb (160.6 kg)   01/06/17 (!) 331 lb (150.1 kg)   09/12/15 (!) 326 lb (147.9 kg)              Today, you had the following     No orders found for display       Primary Care Provider Office Phone # Fax #    Kandy Duff [phone] [address] [facility] [id number]        Equal Access to Services     JULIETA GONZALEZ : Iftikhar Birch, wasekou luqadaha, qaybta kaalmada adeshena, river vaca . So [facility] [phone].    ATENCIÓN: Si habla español, tiene a marvin disposición servicios gratuitos de asistencia lingüística. Llame al [phone].    We comply with applicable federal civil rights laws and [place] laws. We do not discriminate on the basis of race, color, national origin, age, disability, sex, sexual orientation, or gender identity.            Thank you!     Thank you for choosing [facility]  for your care. Our goal is always to provide you with excellent care. Hearing back from our patients is one way we can continue to improve our services. Please take a few minutes to complete the written survey that you may receive in the mail after your visit with us. Thank you!             Your Updated Medication List - Protect others around you: Learn how to safely use, store and throw away your medicines at www.disposemymeds.org.          This list is accurate as of 11/30/18  3:58 PM.  Always use your most recent med list.                   Brand Name Dispense Instructions for use Diagnosis    albuterol 108 (90 Base) MCG/ACT inhaler    PROAIR HFA/PROVENTIL HFA/VENTOLIN HFA    3 Inhaler    Inhale 1-2 puffs into the lungs every 4 hours as needed for shortness of breath " / dyspnea or wheezing    Mild intermittent asthma with exacerbation       amoxicillin-clavulanate 875-125 MG tablet    AUGMENTIN    20 tablet    Take 1 tablet by mouth 2 times daily    Acute non-recurrent maxillary sinusitis       fluticasone 50 MCG/ACT nasal spray    FLONASE    1 Bottle    Spray 1-2 sprays into both nostrils daily    Dysfunction of both eustachian tubes       ipratropium - albuterol 0.5 mg/2.5 mg/3 mL 0.5-2.5 (3) MG/3ML neb solution    DUONEB    30 vial    Take 1 vial (3 mLs) by nebulization every 6 hours as needed for shortness of breath / dyspnea    Mild intermittent asthma with exacerbation       methylPREDNISolone 4 MG tablet therapy pack    MEDROL DOSEPAK    21 tablet    Follow package instructions    Mild intermittent asthma with exacerbation

## 2018-12-06 ENCOUNTER — TELEPHONE (OUTPATIENT)
Dept: FAMILY MEDICINE | Facility: CLINIC | Age: 48
End: 2018-12-06

## 2018-12-06 DIAGNOSIS — J01.00 ACUTE NON-RECURRENT MAXILLARY SINUSITIS: Primary | ICD-10-CM

## 2018-12-06 RX ORDER — CEFDINIR 300 MG/1
300 CAPSULE ORAL 2 TIMES DAILY
Qty: 20 CAPSULE | Refills: 0 | Status: SHIPPED | OUTPATIENT
Start: 2018-12-06 | End: 2019-03-12

## 2018-12-06 NOTE — TELEPHONE ENCOUNTER
Patient calling and  was here 11/30/18 and got Augmentin for a sinus infection.   got vomiting and diarrhea on Monday from Augmentin.  Stopped Augmentin 12/5/18 after am dose.   sinus symptoms are not any better.   has never reacted to Augmentin in past so is confused by his symptoms.   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  Please advise.  Call him back at [phone].  Ashley Baker RN

## 2018-12-06 NOTE — TELEPHONE ENCOUNTER
LM on cell phone that new rx sent, call if questions    Liliam Livingston RN, BS  Clinical Nurse Triage.

## 2018-12-06 NOTE — TELEPHONE ENCOUNTER
Could definitely be related to Augmentin, as it can cause GI upset.   Recommend stopping Augmentin. Will send in new Rx for cefdinir @ [facility] pharmacy.    Please call to inform pt.

## 2019-02-08 ENCOUNTER — OFFICE VISIT (OUTPATIENT)
Dept: SLEEP MEDICINE | Facility: CLINIC | Age: 49
End: 2019-02-08
Payer: COMMERCIAL

## 2019-02-08 VITALS
WEIGHT: 315 LBS | HEIGHT: 74 IN | SYSTOLIC BLOOD PRESSURE: 120 MMHG | DIASTOLIC BLOOD PRESSURE: 77 MMHG | OXYGEN SATURATION: 94 % | HEART RATE: 103 BPM | BODY MASS INDEX: 40.43 KG/M2

## 2019-02-08 DIAGNOSIS — R06.83 SNORING: ICD-10-CM

## 2019-02-08 DIAGNOSIS — G47.30 OBSERVED SLEEP APNEA: Primary | ICD-10-CM

## 2019-02-08 DIAGNOSIS — G47.26 SHIFT WORK SLEEP DISORDER: ICD-10-CM

## 2019-02-08 DIAGNOSIS — E66.01 MORBID OBESITY (H): ICD-10-CM

## 2019-02-08 PROCEDURE — 99205 OFFICE O/P NEW HI 60 MIN: CPT | Mod: GC | Performed by: PSYCHIATRY & NEUROLOGY

## 2019-02-08 RX ORDER — ZOLPIDEM TARTRATE 5 MG/1
TABLET ORAL
Qty: 1 TABLET | Refills: 0 | Status: SHIPPED | OUTPATIENT
Start: 2019-02-08 | End: 2019-04-19

## 2019-02-08 ASSESSMENT — MIFFLIN-ST. JEOR: SCORE: 2496.74

## 2019-02-08 NOTE — PATIENT INSTRUCTIONS
"Consider wearing sunglasses on your drive home from work in the morning.     MY TREATMENT INFORMATION FOR SLEEP APNEA-  Gonzales Melo    DOCTOR : Yves Sethi  SLEEP CENTER :      MY CONTACT NUMBER:     Am I having a sleep study at a sleep center?  Make sure you have an appointment for the study before you leave!    Am I having a home sleep study?  Watch this video:  [email or web address].com/watch?v=CteI_[email or web address]&[email or web address]  Please verify your insurance coverage with your insurance carrier    Frequently asked questions:  1. What is Obstructive Sleep Apnea (CHEPE)? CHEPE is the most common type of sleep apnea. Apnea means, \"without breath.\"  Apnea is most often caused by narrowing or collapse of the upper airway as muscles relax during sleep.   Almost everyone has occasional apneas. Most people with sleep apnea have had brief interruptions at night frequently for many years.  The severity of sleep apnea is related to how frequent and severe the events are.   2. What are the consequences of CHEPE? Symptoms include: feeling sleepy during the day, snoring loudly, gasping or stopping of breathing, trouble sleeping, and occasionally morning headaches or heartburn at night.  Sleepiness can be serious and even increase the risk of falling asleep while driving. Other health consequences may include development of high blood pressure and other cardiovascular disease in persons who are susceptible. Untreated CHEPE  can contribute to heart disease, stroke and diabetes.   3. What are the treatment options? In most situations, sleep apnea is a lifelong disease that must be managed with daily therapy. Medications are not effective for sleep apnea and surgery is generally not considered until other therapies have been tried. Your treatment is your choice . Continuous Positive Airway (CPAP) works right away and is the therapy that is effective in nearly everyone. An oral device to hold your jaw forward " is usually the next most reliable option. Other options include postioning devices (to keep you off your back), weight loss, and surgery including a tongue pacing device. There is more detail about some of these options below.    Important tips for using CPAP and similar devices   Know your equipment:  CPAP is continuous positive airway pressure that prevents obstructive sleep apnea by keeping the throat from collapsing while you are sleeping. In most cases, the device is  smart  and can slowly self-adjusts if your throat collapses and keeps a record every day of how well you are treated-this information is available to you and your care team.  BPAP is bilevel positive airway pressure that keeps your throat open and also assists each breath with a pressure boost to maintain adequate breathing.  Special kinds of BPAP are used in patients who have inadequate breathing from lung or heart disease. In most cases, the device is  smart  and can slowly self-adjusts to assist breathing. Like CPAP, the device keeps a record of how well you are treated.  Your mask is your connection to the device. You get to choose what feels most comfortable and the staff will help to make sure if fits. Here: are some examples of the different masks that are available:       Key points to remember on your journey with sleep apnea:  1. Sleep study.  PAP devices often need to be adjusted during a sleep study to show that they are effective and adjusted right.  2. Good tips to remember: Try wearing just the mask during a quiet time during the day so your body adapts to wearing it. A humidifier is recommended for comfort in most cases to prevent drying of your nose and throat. Allergy medication from your provider may help you if you are having nasal congestion.  3. Getting settled-in. It takes more than one night for most of us to get used to wearing a mask. Try wearing just the mask during a quiet time during the day so your body adapts to  wearing it. A humidifier is recommended for comfort in most cases. Our team will work with you carefully on the first day and will be in contact within 4 days and again at 2 and 4 weeks for advice and remote device adjustments. Your therapy is evaluated by the device each day.   4. Use it every night. The more you are able to sleep naturally for 7-8 hours, the more likely you will have good sleep and to prevent health risks or symptoms from sleep apnea. Even if you use it 4 hours it helps. Occasionally all of us are unable to use a medical therapy, in sleep apnea, it is not dangerous to miss one night.   5. Communicate. Call our skilled team on the number provided on the first day if your visit for problems that make it difficult to wear the device. Over 2 out of 3 patients can learn to wear the device long-term with help from our team. Remember to call our team or your sleep providers if you are unable to wear the device as we may have other solutions for those who cannot adapt to mask CPAP therapy. It is recommended that you sleep your sleep provider within the first 3 months and yearly after that if you are not having problems.   Take care of your equipment. Make sure you clean your mask and tubing using directions every day and that your filter and mask are replaced as recommended or if they are not working.     BESIDES CPAP, WHAT OTHER THERAPIES ARE THERE?    Positioning Device  Positioning devices are generally used when sleep apnea is mild and only occurs on your back.This example shows a pillow that straps around the waist. It may be appropriate for those whose sleep study shows milder sleep apnea that occurs primarily when lying flat on one's back. Preliminary studies have shown benefit but effectiveness at home may need to be verified by a home sleep test. These devices are generally not covered by medical insurance.  Examples of devices that maintain sleeping on the back to prevent snoring and mild sleep  apnea.    Belt type body positioner  Http://[employer].com/    Electronic reminder  Http://nightshifttherapy.com/  Http://www.ipatter.comd.com.au/    Oral Appliance  What is oral appliance therapy?  An oral appliance device fits on your teeth at night like a retainer used after having braces. The device is made by a specialized dentist and requires several visits over 1-2 months before a manufactured device is made to fit your teeth and is adjusted to prevent your sleep apnea. Once an oral device is working properly, snoring should be improved. A home sleep test may be recommended at that time if to determine whether the sleep apnea is adequately treated.       Some things to remember:  -Oral devices are often, but not always, covered by your medical insurance. Be sure to check with your insurance provider.   -If you are referred for oral therapy, you will be given a list of specialized dentists to consider or you may choose to visit the Web site of the American Academy of Dental Sleep Medicine  -Oral devices are less likely to work if you have severe sleep apnea or are extremely overweight.     More detailed information  An oral appliance is a small acrylic device that fits over the upper and lower teeth  (similar to a retainer or a mouth guard). This device slightly moves jaw forward, which moves the base of the tongue forward, opens the airway, improves breathing for effective treat snoring and obstructive sleep apnea in perhaps 7 out of 10 people .  The best working devices are custom-made by a dental device  after a mold is made of the teeth 1, 2, 3.  When is an oral appliance indicated?  Oral appliance therapy is recommended as a first-line treatment for patients with primary snoring, mild sleep apnea, and for patients with moderate sleep apnea who prefer appliance therapy to use of CPAP4, 5. Severity of sleep apnea is determined by sleep testing and is based on the number of respiratory events per hour  of sleep.   How successful is oral appliance therapy?  The success rate of oral appliance therapy in patients with mild sleep apnea is 75-80% while in patients with moderate sleep apnea it is 50-70%. The chance of success in patients with severe sleep apnea is 40-50%. The research also shows that oral appliances have a beneficial effect on the cardiovascular health of CHEPE patients at the same magnitude as CPAP therapy7.  Oral appliances should be a second-line treatment in cases of severe sleep apnea, but if not completely successful then a combination therapy utilizing CPAP plus oral appliance therapy may be effective. Oral appliances tend to be effective in a broad range of patients although studies show that the patients who have the highest success are females, younger patients, those with milder disease, and less severe obesity. 3, 6.   Finding a dentist that practices dental sleep medicine  Specific training is available through the American Academy of Dental Sleep Medicine for dentists interested in working in the field of sleep. To find a dentist who is educated in the field of sleep and the use of oral appliances, near you, visit the Web site of the American Academy of Dental Sleep Medicine.    References  1. Pravin et al. Objectively measured vs self-reported compliance during oral appliance therapy for sleep-disordered breathing. Chest 2013; 144(5): 8726-0491.  2. Steff et al. Objective measurement of compliance during oral appliance therapy for sleep-disordered breathing. Thorax 2013; 68(1): 91-96.  3. Marzena, et al. Mandibular advancement devices in 620 men and women with CHEPE and snoring: tolerability and predictors of treatment success. Chest 2004; 125: 8467-2132.  4. Fran, et al. Oral appliances for snoring and CHEPE: a review. Sleep 2006; 29: 244-262.  5. Donna, et al. Oral appliance treatment for CHEPE: an update. J Clin Sleep Med 2014; 10(2): 215-227.  6. Carole et al.  Predictors of OSAH treatment outcome. J Dent Res 2007; 86: 4941-3247.      Weight Loss:    Weight loss is a long-term strategy that may improve sleep apnea in some patients.    Weight management is a personal decision and the decision should be based on your interest and the potential benefits.  If you are interested in exploring weight loss strategies, the following discussion covers the impact on weight loss on sleep apnea and the approaches that may be successful.    Being overweight does not necessarily mean you will have health consequences.  Those who have BMI over 35 or over 27 with existing medical conditions carries greater risk.   Weight loss decreases severity of sleep apnea in most people with obesity. For those with mild obesity who have developed snoring with weight gain, even 15-30 pound weight loss can improve and occasionally eliminate sleep apnea.  Structured and life-long dietary and health habits are necessary to lose weight and keep healthier weight levels.     Though there may be significant health benefits from weight loss, long-term weight loss is very difficult to achieve- studies show success with dietary management in less than 10% of people. In addition, substantial weight loss may require years of dietary control and may be difficult if patients have severe obesity. In these cases, surgical management may be considered.  Finally, older individuals who have tolerated obesity without health complications may be less likely to benefit from weight loss strategies.          Surgery:    Surgery for obstructive sleep apnea is considered generally only when other therapies fail to work. Surgery may be discussed with you if you are having a difficult time tolerating CPAP and or when there is an abnormal structure that requires surgical correction.  Nose and throat surgeries often enlarge the airway to prevent collapse.  Most of these surgeries create pain for 1-2 weeks and up to half of the most  common surgeries are not effective throughout life.  You should carefully discuss the benefits and drawbacks to surgery with your sleep provider and surgeon to determine if it is the best solution for you.   More information  Surgery for CHEPE is directed at areas that are responsible for narrowing or complete obstruction of the airway during sleep.  There are a wide range of procedures available to enlarge and/or stabilize the airway to prevent blockage of breathing in the three major areas where it can occur: the palate, tongue, and nasal regions.  Successful surgical treatment depends on the accurate identification of the factors responsible for obstructive sleep apnea in each person.  A personalized approach is required because there is no single treatment that works well for everyone.  Because of anatomic variation, consultation with an examination by a sleep surgeon is a critical first step in determining what surgical options are best for each patient.  In some cases, examination during sedation may be recommended in order to guide the selection of procedures.  Patients will be counseled about risks and benefits as well as the typical recovery course after surgery. Surgery is typically not a cure for a person s CHEPE.  However, surgery will often significantly improve one s CEHPE severity (termed  success rate ).  Even in the absence of a cure, surgery will decrease the cardiovascular risk associated with OSA7; improve overall quality of life8 (sleepiness, functionality, sleep quality, etc).      Palate Procedures:  Patients with CHEPE often have narrowing of their airway in the region of their tonsils and uvula.  The goals of palate procedures are to widen the airway in this region as well as to help the tissues resist collapse.  Modern palate procedure techniques focus on tissue conservation and soft tissue rearrangement, rather than tissue removal.  Often the uvula is preserved in this procedure. Residual sleep  apnea is common in patient after pharyngoplasty with an average reduction in sleep apnea events of 33%2.      Tongue Procedures:  ExamWhile patients are awake, the muscles that surround the throat are active and keep this region open for breathing. These muscles relax during sleep, allowing the tongue and other structures to collapse and block breathing.  There are several different tongue procedures available.  Selection of a tongue base procedure depends on characteristics seen on physical exam.  Generally, procedures are aimed at removing bulky tissues in this area or preventing the back of the tongue from falling back during sleep.  Success rates for tongue surgery range from 50-62%3.    Hypoglossal Nerve Stimulation:  Hypoglossal nerve stimulation has recently received approval from the [place] Food and Drug Administration for the treatment of obstructive sleep apnea.  This is based on research showing that the system was safe and effective in treating sleep apnea6.  Results showed that the median AHI score decreased 68%, from 29.3 to 9.0. This therapy uses an implant system that senses breathing patterns and delivers mild stimulation to airway muscles, which keeps the airway open during sleep.  The system consists of three fully implanted components: a small generator (similar in size to a pacemaker), a breathing sensor, and a stimulation lead.  Using a small handheld remote, a patient turns the therapy on before bed and off upon awakening.    Candidates for this device must be greater than 22 years of age, have moderate to severe CHEPE (AHI between 20-65), BMI less than 32, have tried CPAP/oral appliance without success, and have appropriate upper airway anatomy (determined by a sleep endoscopy performed by Dr. Urbina).    Hypoglossal Nerve Stimulation Pathway:    The sleep surgeon s office will work with the patient through the insurance prior-authorization process (including communications and appeals).     Nasal Procedures:  Nasal obstruction can interfere with nasal breathing during the day and night.  Studies have shown that relief of nasal obstruction can improve the ability of some patients to tolerate positive airway pressure therapy for obstructive sleep apnea1.  Treatment options include medications such as nasal saline, topical corticosteroid and antihistamine sprays, and oral medications such as antihistamines or decongestants. Non-surgical treatments can include external nasal dilators for selected patients. If these are not successful by themselves, surgery can improve the nasal airway either alone or in combination with these other options.      Combination Procedures:  Combination of surgical procedures and other treatments may be recommended, particularly if patients have more than one area of narrowing or persistent positional disease.  The success rate of combination surgery ranges from 66-80%2,3.    References  1. Myrtle HEARD. The Role of the Nose in Snoring and Obstructive Sleep Apnoea: An Update.  Eur Arch Otorhinolaryngol. 2011; 268: 1365-73.  2.  Zachariah SM; Es JA; Nery JR; Pallanch JF; Sekou MB; Madison SG; Katherine HUSAIN. Surgical modifications of the upper airway for obstructive sleep apnea in adults: a systematic review and meta-analysis. SLEEP 2010;33(10):1627-1104. Dominicrianam KAUR. Hypopharyngeal surgery in obstructive sleep apnea: an evidence-based medicine review.  Arch Otolaryngol Head Neck Surg. 2006 Feb;132(2):206-13.  3. Prasanna CARRASQUILLOH1, Nirav Y, Miah LESIA. The efficacy of anatomically based multilevel surgery for obstructive sleep apnea. Otolaryngol Head Neck Surg. 2003 Oct;129(4):327-35.  4. Thao KAUR, Goldberg A. Hypopharyngeal Surgery in Obstructive Sleep Apnea: An Evidence-Based Medicine Review. Arch Otolaryngol Head Neck Surg. 2006 Feb;132(2):206-13.  5. Brenda DAVIS et al. Upper-Airway Stimulation for Obstructive Sleep Apnea.  N Engl J Med. 2014 Jan 9;370(2):139-49.  6. Hadley Walker al.  Increased Incidence of Cardiovascular Disease in Middle-aged Men with Obstructive Sleep Apnea. Am J Respir Crit Care Med; 2002 166: 159-165  7. Bhavesh CARTER et al. Studying Life Effects and Effectiveness of Palatopharyngoplasty (SLEEP) study: Subjective Outcomes of Isolated Uvulopalatopharyngoplasty. Otolaryngol Head Neck Surg. 2011; 144: 623-631.              Your blood pressure was checked while you were in clinic today.  Please read the guidelines below about what these numbers mean and what you should do about them.  Your systolic blood pressure is the top number.  This is the pressure when the heart is pumping.  Your diastolic blood pressure is the bottom number.  This is the pressure in between beats.  If your systolic blood pressure is less than 120 and your diastolic blood pressure is less than 80, then your blood pressure is normal. There is nothing more that you need to do about it  If your systolic blood pressure is 120-139 or your diastolic blood pressure is 80-89, your blood pressure may be higher than it should be.  You should have your blood pressure re-checked within a year by a primary care provider.  If your systolic blood pressure is 140 or greater or your diastolic blood pressure is 90 or greater, you may have high blood pressure.  High blood pressure is treatable, but if left untreated over time it can put you at risk for heart attack, stroke, or kidney failure.  You should have your blood pressure re-checked by a primary care provider within the next four weeks.  Your BMI is Body mass index is 44.05 kg/m .  Weight management is a personal decision.  If you are interested in exploring weight loss strategies, the following discussion covers the approaches that may be successful. Body mass index (BMI) is one way to tell whether you are at a healthy weight, overweight, or obese. It measures your weight in relation to your height.  A BMI of 18.5 to 24.9 is in the healthy range. A person with a BMI of  25 to 29.9 is considered overweight, and someone with a BMI of 30 or greater is considered obese. More than two-thirds of American adults are considered overweight or obese.  Being overweight or obese increases the risk for further weight gain. Excess weight may lead to heart disease and diabetes.  Creating and following plans for healthy eating and physical activity may help you improve your health.  Weight control is part of healthy lifestyle and includes exercise, emotional health, and healthy eating habits. Careful eating habits lifelong are the mainstay of weight control. Though there are significant health benefits from weight loss, long-term weight loss with diet alone may be very difficult to achieve- studies show long-term success with dietary management in less than 10% of people. Attaining a healthy weight may be especially difficult to achieve in those with severe obesity. In some cases, medications, devices and surgical management might be considered.  What can you do?  If you are overweight or obese and are interested in methods for weight loss, you should discuss this with your provider.     Consider reducing daily calorie intake by 500 calories.     Keep a food journal.     Avoiding skipping meals, consider cutting portions instead.    Diet combined with exercise helps maintain muscle while optimizing fat loss. Strength training is particularly important for building and maintaining muscle mass. Exercise helps reduce stress, increase energy, and improves fitness. Increasing exercise without diet control, however, may not burn enough calories to loose weight.       Start walking three days a week 10-20 minutes at a time    Work towards walking thirty minutes five days a week     Eventually, increase the speed of your walking for 1-2 minutes at time    In addition, we recommend that you review healthy lifestyles and methods for weight loss available through the National Institutes of Health patient  information sites:  http://win.niddk.nih.gov/publications/index.htm    And look into health and wellness programs that may be available through your health insurance provider, employer, local community center, or jere club.    Weight management plan: Patient was referred to their PCP to discuss a diet and exercise plan.

## 2019-02-08 NOTE — NURSING NOTE
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    Weight as of this encounter: 155.7 kg (343 lb 3.2 oz).    Medication Reconciliation: complete    Neck circumference: 17.75 inches / 45 centimeters.    DME: no    ESS 23    Ruby Hoyt, Sleep clinic specialist  "

## 2019-02-28 ENCOUNTER — OFFICE VISIT (OUTPATIENT)
Dept: SLEEP MEDICINE | Facility: CLINIC | Age: 49
End: 2019-02-28
Payer: COMMERCIAL

## 2019-02-28 DIAGNOSIS — R06.83 SNORING: Primary | ICD-10-CM

## 2019-02-28 PROCEDURE — 95803 ACTIGRAPHY TESTING: CPT | Performed by: INTERNAL MEDICINE

## 2019-02-28 NOTE — PROGRESS NOTES
Actigraphy . Patient demonstrated and verbalized knowledge of use. Patient given sleep log and will fill out accordingly for the next 2 weeks.      Maryan Herrera  Sleep Clinic - Specialist

## 2019-03-12 ENCOUNTER — OFFICE VISIT (OUTPATIENT)
Dept: FAMILY MEDICINE | Facility: CLINIC | Age: 49
End: 2019-03-12
Payer: COMMERCIAL

## 2019-03-12 VITALS
TEMPERATURE: 98.5 F | HEART RATE: 108 BPM | DIASTOLIC BLOOD PRESSURE: 87 MMHG | WEIGHT: 315 LBS | RESPIRATION RATE: 16 BRPM | SYSTOLIC BLOOD PRESSURE: 138 MMHG | BODY MASS INDEX: 44.02 KG/M2

## 2019-03-12 DIAGNOSIS — J45.21 MILD INTERMITTENT ASTHMA WITH EXACERBATION: ICD-10-CM

## 2019-03-12 DIAGNOSIS — M54.50 ACUTE MIDLINE LOW BACK PAIN WITHOUT SCIATICA: Primary | ICD-10-CM

## 2019-03-12 DIAGNOSIS — M25.551 HIP PAIN, RIGHT: ICD-10-CM

## 2019-03-12 PROCEDURE — 99214 OFFICE O/P EST MOD 30 MIN: CPT | Performed by: PHYSICIAN ASSISTANT

## 2019-03-12 RX ORDER — ALBUTEROL SULFATE 90 UG/1
1-2 AEROSOL, METERED RESPIRATORY (INHALATION) EVERY 4 HOURS PRN
Qty: 18 G | Refills: 1 | Status: SHIPPED | OUTPATIENT
Start: 2019-03-12 | End: 2020-04-07

## 2019-03-12 RX ORDER — CYCLOBENZAPRINE HCL 10 MG
10 TABLET ORAL
Qty: 30 TABLET | Refills: 0 | Status: SHIPPED | OUTPATIENT
Start: 2019-03-12 | End: 2019-04-19

## 2019-03-12 RX ORDER — IPRATROPIUM BROMIDE AND ALBUTEROL SULFATE 2.5; .5 MG/3ML; MG/3ML
3 SOLUTION RESPIRATORY (INHALATION) EVERY 6 HOURS PRN
Qty: 30 VIAL | Refills: 1 | Status: SHIPPED | OUTPATIENT
Start: 2019-03-12 | End: 2020-04-07

## 2019-03-12 RX ORDER — METHYLPREDNISOLONE 4 MG
TABLET, DOSE PACK ORAL
Qty: 21 TABLET | Refills: 0 | Status: SHIPPED | OUTPATIENT
Start: 2019-03-12 | End: 2019-04-19

## 2019-03-12 NOTE — PROGRESS NOTES
SUBJECTIVE:   Gonzales Melo is a 48 year old male who presents to clinic today for the following health issues:      Joint Pain    Onset: X 4 weeks    Description:   Location: right hip  Character: Sharp during movement and Dull ache    Intensity: moderate, severe    Progression of Symptoms: worse    Accompanying Signs & Symptoms:  Other symptoms: notes history of lower back pain, does have right leg numbness previously to this injury     History:   Previous similar pain: no       Precipitating factors:   Trauma or overuse: no     Alleviating factors:  Improved by: nothing    Therapies Tried and outcome: rest worked previously        Back Pain       Duration: a few weeks        Specific cause: none    Description:   Location of pain: low back right and hip right  Character of pain: sharp and dull ache  Pain radiation:radiates into the right leg  New numbness or weakness in legs, not attributed to pain:  no     Intensity:     History:   Pain interferes with job: No,   History of back problems: previous degenerative joint disease of the lumbar spine  Any previous MRI or X-rays: Yes- at Chiropractor.  Date 2007  Sees a specialist for back pain:  No  Therapies tried without relief: cold, heat and rest    Alleviating factors:   Improved by: sleep    Precipitating factors:  Worsened by: Nothing          Accompanying Signs & Symptoms:  Risk of Fracture:  None  Risk of Cauda Equina:  None  Risk of Infection:  None  Risk of Cancer:  None  Risk of Ankylosing Spondylitis:  Onset at age <35, male, AND morning back stiffness. no            Problem list and histories reviewed & adjusted, as indicated.  Additional history: as documented    Patient Active Problem List   Diagnosis     Asthma, mild intermittent     CARDIOVASCULAR SCREENING; LDL GOAL LESS THAN 160     Obesity     Hypertriglyceridemia     Plantar fasciitis     Morbid obesity (H)     Past Surgical History:   Procedure Laterality Date     COLONOSCOPY  9/30/2015     Dr. Son [facility]     COLONOSCOPY N/A 9/30/2015    Procedure: COLONOSCOPY;  Surgeon: Marvin Son MD;  Location: [facility]       Social History     Tobacco Use     Smoking status: Never Smoker     Smokeless tobacco: Never Used   Substance Use Topics     Alcohol use: No     Family History   Problem Relation Age of Onset     Family History Negative Mother      Hypertension Maternal Grandmother      Heart Disease Brother            Reviewed and updated as needed this visit by clinical staff  Tobacco  Allergies  Meds  Med Hx  Surg Hx  Fam Hx  Soc Hx      Reviewed and updated as needed this visit by Provider         ROS:  Constitutional, HEENT, cardiovascular, pulmonary, gi and gu systems are negative, except as otherwise noted.    OBJECTIVE:     /87 (BP Location: Right arm, Patient Position: Chair, Cuff Size: Adult Large)   Pulse 108   Temp 98.5  F (36.9  C) (Oral)   Resp 16   Wt (!) 155.6 kg (343 lb)   BMI 44.02 kg/m    Body mass index is 44.02 kg/m .  GENERAL APPEARANCE: healthy, alert and no distress  ORTHO: Hip Exam: Palpation: Tender:     Non-tender:  right greater trochanter, right gluteus medius  Range of Motion:  Right Hip  LROM  Strength:  full strength  Special tests:  no crepitation/snapping over central inguinal region, IT band tightness  Right    Lumber/Thoracic Spine Exam: Tender:  right para lumbar muscles, right SI joint  Non-tender:  lumbar facet joints, left para lumbar muscles  Range of Motion:  Pain with flexion and extension  Strength:  able to toe walk, unable to heel walk  Special tests:  positive right straight leg raise        Diagnostic Test Results:  none     ASSESSMENT/PLAN:             1. Mild intermittent asthma with exacerbation  Stable.   - albuterol (PROAIR HFA/PROVENTIL HFA/VENTOLIN HFA) 108 (90 Base) MCG/ACT inhaler; Inhale 1-2 puffs into the lungs every 4 hours as needed for shortness of breath / dyspnea or wheezing  Dispense: 18 g; Refill: 1  - ipratropium -  albuterol 0.5 mg/2.5 mg/3 mL (DUONEB) 0.5-2.5 (3) MG/3ML neb solution; Take 1 vial (3 mLs) by nebulization every 6 hours as needed for shortness of breath / dyspnea  Dispense: 30 vial; Refill: 1    2. Acute midline low back pain without sciatica  Ice, heat, rest, steroid taper and muscle relaxants.  Pt is going to see chiropractor.if no improvement call, my chart and will put in P.T. orders  - cyclobenzaprine (FLEXERIL) 10 MG tablet; Take 1 tablet (10 mg) by mouth nightly as needed for muscle spasms  Dispense: 30 tablet; Refill: 0  - methylPREDNISolone (MEDROL DOSEPAK) 4 MG tablet therapy pack; Follow Package Directions  Dispense: 21 tablet; Refill: 0    3. Hip pain, right  See #2  - cyclobenzaprine (FLEXERIL) 10 MG tablet; Take 1 tablet (10 mg) by mouth nightly as needed for muscle spasms  Dispense: 30 tablet; Refill: 0  - methylPREDNISolone (MEDROL DOSEPAK) 4 MG tablet therapy pack; Follow Package Directions  Dispense: 21 tablet; Refill: 0        Jesica Wong PA-C  [facility]

## 2019-03-13 ASSESSMENT — ASTHMA QUESTIONNAIRES: ACT_TOTALSCORE: 24

## 2019-03-15 ENCOUNTER — THERAPY VISIT (OUTPATIENT)
Dept: SLEEP MEDICINE | Facility: CLINIC | Age: 49
End: 2019-03-15
Payer: COMMERCIAL

## 2019-03-15 DIAGNOSIS — G47.33 OSA (OBSTRUCTIVE SLEEP APNEA): Primary | ICD-10-CM

## 2019-03-15 DIAGNOSIS — G47.26 SHIFT WORK SLEEP DISORDER: ICD-10-CM

## 2019-03-15 DIAGNOSIS — E66.01 MORBID OBESITY (H): ICD-10-CM

## 2019-03-15 DIAGNOSIS — G47.30 OBSERVED SLEEP APNEA: ICD-10-CM

## 2019-03-15 DIAGNOSIS — R06.83 SNORING: ICD-10-CM

## 2019-03-15 PROCEDURE — 95811 POLYSOM 6/>YRS CPAP 4/> PARM: CPT | Mod: GC | Performed by: PSYCHIATRY & NEUROLOGY

## 2019-03-16 LAB
BASE EXCESS BLDA CALC-SCNC: 3.7 MMOL/L
HCO3 BLD-SCNC: 29 MMOL/L (ref 21–28)
O2/TOTAL GAS SETTING VFR VENT: ABNORMAL %
OXYHGB MFR BLD: 96 % (ref 92–100)
PCO2 BLD: 46 MM HG (ref 35–45)
PH BLD: 7.41 PH (ref 7.35–7.45)
PO2 BLD: 86 MM HG (ref 80–105)

## 2019-03-16 PROCEDURE — 36600 WITHDRAWAL OF ARTERIAL BLOOD: CPT | Performed by: INTERNAL MEDICINE

## 2019-03-16 PROCEDURE — 82805 BLOOD GASES W/O2 SATURATION: CPT | Performed by: INTERNAL MEDICINE

## 2019-03-16 NOTE — PROGRESS NOTES
Completed a split night PSG per provider order.    Preliminary AHI .  A final therapeutic PAP pressure was achieved.    Supine REM was seen on therapeutic pressure.    Patient reports feeling refreshed in AM.

## 2019-03-19 NOTE — PROGRESS NOTES
Pt was called to discuss PSG results and he elected to start CPAP therapy.   And order for auto CPAP 7-15 cm H2O was placed.   Plan for clinic follow up in 6-8 weeks with data download.     Yves Sethi, DO  Sleep Medicine Fellow

## 2019-03-20 LAB — SLPCOMP: NORMAL

## 2019-03-21 ENCOUNTER — TELEPHONE (OUTPATIENT)
Dept: SLEEP MEDICINE | Facility: CLINIC | Age: 49
End: 2019-03-21

## 2019-03-26 ENCOUNTER — DOCUMENTATION ONLY (OUTPATIENT)
Dept: SLEEP MEDICINE | Facility: CLINIC | Age: 49
End: 2019-03-26
Payer: COMMERCIAL

## 2019-03-26 DIAGNOSIS — G47.33 OSA (OBSTRUCTIVE SLEEP APNEA): Primary | ICD-10-CM

## 2019-03-26 NOTE — PROGRESS NOTES
Patient was offered choice of vendor and chose [facility].  Patient Gonzales Melo was set up at [facility] on March 26, 2019. Patient received a Resmed AirSense 10 Auto. Pressures were set at 7-15 cm H2O.   Patient s ramp is 5 cm H2O for Auto and FLEX/EPR is 2.  Patient received a [employer] Respironics Dreamwear  Full Face mask size Medium, heated tubing and heated humidifier.  Patient is enrolled in the STM Program and does not need to meet compliance. Patient has a follow up on 03/29/2019 with Dr. Leigh.    ECHO CHARLES

## 2019-03-29 ENCOUNTER — OFFICE VISIT (OUTPATIENT)
Dept: SLEEP MEDICINE | Facility: CLINIC | Age: 49
End: 2019-03-29
Payer: COMMERCIAL

## 2019-03-29 VITALS
OXYGEN SATURATION: 94 % | BODY MASS INDEX: 40.43 KG/M2 | HEART RATE: 108 BPM | WEIGHT: 315 LBS | SYSTOLIC BLOOD PRESSURE: 133 MMHG | DIASTOLIC BLOOD PRESSURE: 81 MMHG | HEIGHT: 74 IN

## 2019-03-29 DIAGNOSIS — G47.33 OSA (OBSTRUCTIVE SLEEP APNEA): Primary | ICD-10-CM

## 2019-03-29 PROCEDURE — 99214 OFFICE O/P EST MOD 30 MIN: CPT | Performed by: INTERNAL MEDICINE

## 2019-03-29 ASSESSMENT — MIFFLIN-ST. JEOR: SCORE: 2495.84

## 2019-03-29 NOTE — PATIENT INSTRUCTIONS
Your blood pressure was checked while you were in clinic today.  Please read the guidelines below about what these numbers mean and what you should do about them.  Your systolic blood pressure is the top number.  This is the pressure when the heart is pumping.  Your diastolic blood pressure is the bottom number.  This is the pressure in between beats.  If your systolic blood pressure is less than 120 and your diastolic blood pressure is less than 80, then your blood pressure is normal. There is nothing more that you need to do about it  If your systolic blood pressure is 120-139 or your diastolic blood pressure is 80-89, your blood pressure may be higher than it should be.  You should have your blood pressure re-checked within a year by a primary care provider.  If your systolic blood pressure is 140 or greater or your diastolic blood pressure is 90 or greater, you may have high blood pressure.  High blood pressure is treatable, but if left untreated over time it can put you at risk for heart attack, stroke, or kidney failure.  You should have your blood pressure re-checked by a primary care provider within the next four weeks.  Your BMI is There is no height or weight on file to calculate BMI.  Weight management is a personal decision.  If you are interested in exploring weight loss strategies, the following discussion covers the approaches that may be successful. Body mass index (BMI) is one way to tell whether you are at a healthy weight, overweight, or obese. It measures your weight in relation to your height.  A BMI of 18.5 to 24.9 is in the healthy range. A person with a BMI of 25 to 29.9 is considered overweight, and someone with a BMI of 30 or greater is considered obese. More than two-thirds of American adults are considered overweight or obese.  Being overweight or obese increases the risk for further weight gain. Excess weight may lead to heart disease and diabetes.  Creating and following plans for  healthy eating and physical activity may help you improve your health.  Weight control is part of healthy lifestyle and includes exercise, emotional health, and healthy eating habits. Careful eating habits lifelong are the mainstay of weight control. Though there are significant health benefits from weight loss, long-term weight loss with diet alone may be very difficult to achieve- studies show long-term success with dietary management in less than 10% of people. Attaining a healthy weight may be especially difficult to achieve in those with severe obesity. In some cases, medications, devices and surgical management might be considered.  What can you do?  If you are overweight or obese and are interested in methods for weight loss, you should discuss this with your provider.     Consider reducing daily calorie intake by 500 calories.     Keep a food journal.     Avoiding skipping meals, consider cutting portions instead.    Diet combined with exercise helps maintain muscle while optimizing fat loss. Strength training is particularly important for building and maintaining muscle mass. Exercise helps reduce stress, increase energy, and improves fitness. Increasing exercise without diet control, however, may not burn enough calories to loose weight.       Start walking three days a week 10-20 minutes at a time    Work towards walking thirty minutes five days a week     Eventually, increase the speed of your walking for 1-2 minutes at time    In addition, we recommend that you review healthy lifestyles and methods for weight loss available through the National Institutes of Health patient information sites:  http://win.niddk.nih.gov/publications/index.htm    And look into health and wellness programs that may be available through your health insurance provider, employer, local community center, or jere club.    Weight management plan: Patient was referred to their PCP to discuss a diet and exercise plan.

## 2019-03-29 NOTE — PROGRESS NOTES
[facility]  Outpatient Sleep Medicine Consultation  March 29, 2019      Name: Gonzales Melo MRN# [id number]   Age: 48 year old YOB: 1970     Date of Consultation: March 29, 2019  Consultation is requested by: Jesica Wong PA-C  [address] [place] [address]  Primary care provider: Kandy Duff               Assessment and Plan:       Severe obstructive sleep apnea with hypoxemia currently effectively initiated on auto titration CPAP with elimination of daytime sleepiness and residual AHI of less than 5    Delayed sleep wake phase     Night shift work with chronic insufficient sleep estimated by Actigraphy to be less than 5 hours per night with inability to sleep on nonwork nights    Severe obesity          Summary Recommendations:      We have counseled the patient in long-term management of sleep apnea with CPAP for reduction in cardiovascular risk and have additionally counseled him on his tendency for delayed in sleep phase since adolescence and preference for non-morning shift are indicators for delayed sleep phase.  In this setting we would recommend second shift work.               History of Present Illness:     Gonzales Melo is a 48 year old male with profound daytime sleepiness in the setting of severe obstructive sleep apnea with associated hypoxemia and profound sleep disruption as well as chronically insufficient sleep with active graphically measured sleep of 4-5 hours on a 24-hour basis over monitoring between February 28 and March 5, 2019.  He had 212-hour shifts during that time.  And extremely poor sleep consolidation on attempted night sleep and day sleep surrounding these work days.  He does acknowledge a tendency for delayed sleep onset  2-3 AM and awakening 10-11 AM since adolescence.  PREVIOUS SLEEP STUDIES:  Date: March 2019  AHI: 100 with 60 minutes of hypoxemia      CPAP Compliance:  Dates:  Currently only 3 days of use with AHI of  "less than 5              Medications:     Current Outpatient Medications   Medication Sig     albuterol (PROAIR HFA/PROVENTIL HFA/VENTOLIN HFA) 108 (90 Base) MCG/ACT inhaler Inhale 1-2 puffs into the lungs every 4 hours as needed for shortness of breath / dyspnea or wheezing     fluticasone (FLONASE) 50 MCG/ACT spray Spray 1-2 sprays into both nostrils daily     ipratropium - albuterol 0.5 mg/2.5 mg/3 mL (DUONEB) 0.5-2.5 (3) MG/3ML neb solution Take 1 vial (3 mLs) by nebulization every 6 hours as needed for shortness of breath / dyspnea     cyclobenzaprine (FLEXERIL) 10 MG tablet Take 1 tablet (10 mg) by mouth nightly as needed for muscle spasms (Patient not taking: Reported on 3/29/2019)     methylPREDNISolone (MEDROL DOSEPAK) 4 MG tablet therapy pack Follow Package Directions (Patient not taking: Reported on 3/29/2019)     zolpidem (AMBIEN) 5 MG tablet Take tablet by mouth 15 minutes prior to sleep, for Sleep Study (Patient not taking: Reported on 3/12/2019)     No current facility-administered medications for this visit.         Allergies   Allergen Reactions     Mucinex Other (See Comments) and Hives     lip swelling            Past Medical History:     Does not need 02 supplement at night   Past Medical History:   Diagnosis Date     Asthma, mild intermittent      Obesity 1/30/2014     Plantar fasciitis 1/30/2014             Past Surgical History:    No h/o  upper airway surgery  Past Surgical History:   Procedure Laterality Date     COLONOSCOPY  9/30/2015    Dr. Son [facility]     COLONOSCOPY N/A 9/30/2015    Procedure: COLONOSCOPY;  Surgeon: Marvin Son MD;  Location:  GI                      Physical Examination:   /81   Pulse 108   Ht 1.88 m (6' 2.02\")   Wt (!) 155.6 kg (343 lb)   SpO2 94%   BMI 44.02 kg/m               Copy to: Kandy Duff MD 3/29/2019     [facility]   Floor 1, [address]   ?[address]   [place] [address]   Appointments: [phone]    [facility]  3rd Floor  [address], [place] [address]     Total time spent with patient: 25 min >50% counseling

## 2019-03-29 NOTE — NURSING NOTE
"Chief Complaint   Patient presents with     RECHECK     f/u acti, split/tcm        Initial /81   Pulse 108   Ht 1.88 m (6' 2.02\")   Wt (!) 155.6 kg (343 lb)   SpO2 94%   BMI 44.02 kg/m   Estimated body mass index is 44.02 kg/m  as calculated from the following:    Height as of this encounter: 1.88 m (6' 2.02\").    Weight as of this encounter: 155.6 kg (343 lb).    Medication Reconciliation: complete      DME: yes    Ruby Hoyt  "

## 2019-04-01 ENCOUNTER — DOCUMENTATION ONLY (OUTPATIENT)
Dept: SLEEP MEDICINE | Facility: CLINIC | Age: 49
End: 2019-04-01

## 2019-04-01 NOTE — PROGRESS NOTES
3 DAY STM VISIT    Diagnostic AHI: 100   PSG    Patient contacted for 3 day STM visit  Message left for patient to return call     Device type: Auto-CPAP  PAP settings from order::  CPAP min 7 cm  H20       CPAP max 15 cm  H20        Mask type:    Full Face Mask     Device settings from machine      Min CPAP 7.0            Max CPAP 15.0            Assessment: Nightly usage over four hours.  Action plan: Pt to have f/u 14 day STM visit.  Patient has a follow up visit scheduled:   yes within 31-90 days of set up.

## 2019-04-10 ENCOUNTER — DOCUMENTATION ONLY (OUTPATIENT)
Dept: SLEEP MEDICINE | Facility: CLINIC | Age: 49
End: 2019-04-10

## 2019-04-10 NOTE — PROGRESS NOTES
14  DAY STM VISIT    Diagnostic AHI: 100   PSG    Message left for patient to return call     Assessment: Pt meeting objective benchmarks.       Action plan: waiting for patient to return call.  and pt to have 30 day STM visit.      Device type: Auto-CPAP    PAP settings: CPAP min 7.0 cm  H20       CPAP max 15.0 cm  H20          95th% pressure 13 cm  H20     Mask type:  Full Face Mask    Objective measures: 14 day rolling measures      Compliance  85 %      Leak  0.64 lpm  last  upload      AHI 1.53   last  upload      Average number of minutes 411      Objective measure goal  Compliance   Goal >70%  Leak   Goal < 24 lpm  AHI  Goal < 5  Usage  Goal >240

## 2019-04-19 ENCOUNTER — OFFICE VISIT (OUTPATIENT)
Dept: FAMILY MEDICINE | Facility: CLINIC | Age: 49
End: 2019-04-19
Payer: COMMERCIAL

## 2019-04-19 VITALS
SYSTOLIC BLOOD PRESSURE: 115 MMHG | WEIGHT: 315 LBS | HEART RATE: 95 BPM | RESPIRATION RATE: 16 BRPM | BODY MASS INDEX: 40.43 KG/M2 | TEMPERATURE: 98.4 F | DIASTOLIC BLOOD PRESSURE: 81 MMHG | OXYGEN SATURATION: 98 % | HEIGHT: 74 IN

## 2019-04-19 DIAGNOSIS — A08.4 VIRAL GASTROENTERITIS: Primary | ICD-10-CM

## 2019-04-19 PROCEDURE — 99213 OFFICE O/P EST LOW 20 MIN: CPT | Performed by: PHYSICIAN ASSISTANT

## 2019-04-19 RX ORDER — ONDANSETRON 8 MG/1
8 TABLET, ORALLY DISINTEGRATING ORAL EVERY 8 HOURS PRN
Qty: 20 TABLET | Refills: 0 | Status: SHIPPED | OUTPATIENT
Start: 2019-04-19 | End: 2019-10-07

## 2019-04-19 ASSESSMENT — MIFFLIN-ST. JEOR: SCORE: 2468.37

## 2019-04-19 NOTE — PROGRESS NOTES
SUBJECTIVE:   Gonzales Melo is a 48 year old male who presents to clinic today for the following   health issues:        ABDOMINAL   PAIN     Onset: 3 days     Description:   Character: Cramping  Location: epigastric region kayy-umbilical region  Radiation: None    Intensity: mild    Progression of Symptoms:  improving    Accompanying Signs & Symptoms:  Fever/Chills?: no   Gas/Bloating: no   Nausea: YES  Vomitting: YES  Diarrhea?: no   Constipation:no   Dysuria or Hematuria: no    History:   Trauma: no   Previous similar pain: YES   Previous tests done: none    Precipitating factors:   Does the pain change with:     Food: YES- plain food      BM: no     Urination: no     Alleviating factors:      Therapies Tried and outcome: none    LMP:  not applicable    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          Additional history: as documented    Reviewed  and updated as needed this visit by clinical staff         Reviewed and updated as needed this visit by Provider         Patient Active Problem List   Diagnosis     Asthma, mild intermittent     CARDIOVASCULAR SCREENING; LDL GOAL LESS THAN 160     Obesity     Hypertriglyceridemia     Plantar fasciitis     Morbid obesity (H)     Past Surgical History:   Procedure Laterality Date     COLONOSCOPY  9/30/2015    Dr. Son [facility]     COLONOSCOPY N/A 9/30/2015    Procedure: COLONOSCOPY;  Surgeon: Marvin Son MD;  Location:  GI       Social History     Tobacco Use     Smoking status: Never Smoker     Smokeless tobacco: Never Used   Substance Use Topics     Alcohol use: No     Family History   Problem Relation Age of Onset     Family History Negative Mother      Hypertension Maternal Grandmother      Heart Disease Brother          Current Outpatient Medications   Medication Sig Dispense Refill     albuterol (PROAIR HFA/PROVENTIL HFA/VENTOLIN HFA) 108 (90 Base) MCG/ACT inhaler Inhale 1-2 puffs into the lungs every 4 hours as needed for  "shortness of breath / dyspnea or wheezing (Patient not taking: Reported on 4/19/2019) 18 g 1     fluticasone (FLONASE) 50 MCG/ACT spray Spray 1-2 sprays into both nostrils daily (Patient not taking: Reported on 4/19/2019) 1 Bottle 11     ipratropium - albuterol 0.5 mg/2.5 mg/3 mL (DUONEB) 0.5-2.5 (3) MG/3ML neb solution Take 1 vial (3 mLs) by nebulization every 6 hours as needed for shortness of breath / dyspnea (Patient not taking: Reported on 4/19/2019) 30 vial 1     Allergies   Allergen Reactions     Mucinex Other (See Comments) and Hives     lip swelling       ROS:  Constitutional, HEENT, cardiovascular, pulmonary, gi and gu systems are negative, except as otherwise noted.    OBJECTIVE:     /81 (BP Location: Right arm, Patient Position: Chair, Cuff Size: Adult Regular)   Pulse 95   Temp 98.4  F (36.9  C) (Oral)   Resp 16   Ht 1.88 m (6' 2\")   Wt (!) 152.9 kg (337 lb)   SpO2 98%   BMI 43.27 kg/m    Body mass index is 43.27 kg/m .  GENERAL: healthy, alert and no distress  EYES: Eyes grossly normal to inspection, PERRL and conjunctivae and sclerae normal  RESP: lungs clear to auscultation - no rales, rhonchi or wheezes  CV: regular rate and rhythm, normal S1 S2, no S3 or S4, no murmur, click or rub, no peripheral edema and peripheral pulses strong  ABDOMEN: soft, nontender, no hepatosplenomegaly, no masses and bowel sounds normal  MS: no gross musculoskeletal defects noted, no edema  SKIN: no suspicious lesions or rashes  NEURO: Normal strength and tone, mentation intact and speech normal  PSYCH: mentation appears normal, affect normal/bright    Diagnostic Test Results:  none     ASSESSMENT/PLAN:       (A08.4) Viral gastroenteritis  (primary encounter diagnosis)    Comment: Use Zofran as needed for nausea and vomiting. Imodium if diarrhea develops. Continue to push fluids and eat BRAT diet.    Plan: ondansetron (ZOFRAN-ODT) 8 MG ODT tab              Patient Instructions       Patient Education " "    Viral Gastroenteritis (Adult)    Gastroenteritis is commonly called the \"stomach flu,\" although it has nothing to do with influenza. It is most often caused by a virus that affects the stomach and intestinal tract and usually lasts from 2 to 7 days. Common viruses causing gastroenteritis include norovirus, rotavirus, and hepatitis A. Non-viral causes of gastroenteritis include bacteria, parasites, and toxins.  The danger from repeated vomiting or diarrhea is dehydration. This is the loss of too much fluid from the body. When this occurs, body fluids must be replaced. Antibiotics don't help with this illness because it is usually viral. Simple home treatment will be helpful.  Symptoms of viral gastroenteritis may include:    Watery, loose stools    Stomach pain or abdominal cramps    Fever and chills    Nausea and vomiting    Loss of bowel control    Headache  Home care  Gastroenteritis is transmitted by contact with the stool or vomit of an infected person. This can occur from person to person or from contact with a contaminated surface.  Follow these guidelines when caring for yourself at home:    If symptoms are severe, rest at home for the next 24 hours or until you are feeling better.    Wash your hands with soap and water or use alcohol-based  to prevent the spread of infection. Wash your hands after touching anyone who is sick.    Wash your hands or use alcohol-based  after using the toilet and before meals. Clean the toilet after each use.  Remember these tips when preparing food:    People with diarrhea should not prepare or serve food to others. When preparing foods, wash your hands before and after.    Wash your hands after using cutting boards, countertops, knives, or utensils that have been in contact with raw food.    Dry your hands with a single use towel.    Keep uncooked meats away from cooked and ready-to-eat foods.  Medicine  You may use acetaminophen or NSAID medicines like " ibuprofen or naproxen to control fever unless another medicine was given. If you have chronic liver or kidney disease, talk with your healthcare provider before using these medicines. Also talk with your provider if you've had a stomach ulcer or gastrointestinal bleeding. Don't give aspirin to anyone under 18 years of age who is ill with a fever. It may cause severe liver damage. Don't use NSAIDS is you are already taking one for another condition (like arthritis) or are on aspirin (such as for heart disease or after a stroke).  If medicine for vomiting or diarrhea are prescribed, take these only as directed. Nausea and diarrhea medicines are generally OK unless you have bleeding, fever, or severe abdominal pain.  Diet  Follow these guidelines for food:    Water and liquids are important so you don't get dehydrated. Drink a small amount at a time or suck on ice chips if you are vomiting.    If you eat, avoid fatty, greasy, spicy, or fried foods.    Don't eat dairy if you have diarrhea. This can make diarrhea worse.    Avoid tobacco, alcohol, and caffeine which may worsen symptoms.  During the first 24 hours (the first full day), follow the diet below:    Beverages. Sports drinks, soft drinks without caffeine, ginger ale, mineral water (plain or flavored), decaffeinated tea and coffee. If you are very dehydrated, sports drinks aren't a good choice. They have too much sugar and not enough electrolytes. In this case, commercially available products called oral rehydration solutions, are best.    Soups. Eat clear broth, consommé, and bouillon.    Desserts. Eat gelatin, ice pops, and fruit juice bars.  During the next 24 hours (the second day), you may add the following to the above:    Hot cereal, plain toast, bread, rolls, and crackers    Plain noodles, rice, mashed potatoes, chicken noodle or rice soup    Unsweetened canned fruit (avoid pineapple), bananas    Limit fat intake to less than 15 grams per day. Do this by  avoiding margarine, butter, oils, mayonnaise, sauces, gravies, fried foods, peanut butter, meat, poultry, and fish.    Limit fiber and avoid raw or cooked vegetables, fresh fruits (except bananas), and bran cereals.    Limit caffeine and chocolate. Don't use spices or seasonings other than salt.    Limit dairy products.    Avoid alcohol.  During the next 24 hours:    Gradually resume a normal diet as you feel better and your symptoms improve.    If at any time it starts getting worse again, go back to clear liquids until you feel better.  Follow-up care  Follow up with your healthcare provider, or as advised. Call your provider if you don't get better within 24 hours or if diarrhea lasts more than a week. Also follow up if you are unable to keep down liquids and get dehydrated. If a stool (diarrhea) sample was taken, call as directed for the results.  Call 911  Call 911 if any of these occur:    Trouble breathing    Chest pain    Confused    Severe drowsiness or trouble awakening    Fainting or loss of consciousness    Rapid heart rate    Seizure    Stiff neck   When to seek medical advice  Call your healthcare provider right away if any of these occur:    Abdominal pain that gets worse    Continued vomiting (unable to keep liquids down)    Frequent diarrhea (more than 5 times a day)    Blood in vomit or stool (black or red color)    Dark urine, reduced urine output, or extreme thirst    Weakness or dizziness    Drowsiness    Fever of 100.4 F (38 C) or higher, or as directed by your healthcare provider    New rash  Date Last Reviewed: 6/1/2018 2000-2018 [employer]. [address] [place] [address], [place] [address]. All rights reserved. This information is not intended as a substitute for professional medical care. Always follow your healthcare professional's instructions.               Danny Canas PA-C  [facility]

## 2019-04-19 NOTE — PATIENT INSTRUCTIONS
"  Patient Education     Viral Gastroenteritis (Adult)    Gastroenteritis is commonly called the \"stomach flu,\" although it has nothing to do with influenza. It is most often caused by a virus that affects the stomach and intestinal tract and usually lasts from 2 to 7 days. Common viruses causing gastroenteritis include norovirus, rotavirus, and hepatitis A. Non-viral causes of gastroenteritis include bacteria, parasites, and toxins.  The danger from repeated vomiting or diarrhea is dehydration. This is the loss of too much fluid from the body. When this occurs, body fluids must be replaced. Antibiotics don't help with this illness because it is usually viral. Simple home treatment will be helpful.  Symptoms of viral gastroenteritis may include:    Watery, loose stools    Stomach pain or abdominal cramps    Fever and chills    Nausea and vomiting    Loss of bowel control    Headache  Home care  Gastroenteritis is transmitted by contact with the stool or vomit of an infected person. This can occur from person to person or from contact with a contaminated surface.  Follow these guidelines when caring for yourself at home:    If symptoms are severe, rest at home for the next 24 hours or until you are feeling better.    Wash your hands with soap and water or use alcohol-based  to prevent the spread of infection. Wash your hands after touching anyone who is sick.    Wash your hands or use alcohol-based  after using the toilet and before meals. Clean the toilet after each use.  Remember these tips when preparing food:    People with diarrhea should not prepare or serve food to others. When preparing foods, wash your hands before and after.    Wash your hands after using cutting boards, countertops, knives, or utensils that have been in contact with raw food.    Dry your hands with a single use towel.    Keep uncooked meats away from cooked and ready-to-eat foods.  Medicine  You may use acetaminophen or " NSAID medicines like ibuprofen or naproxen to control fever unless another medicine was given. If you have chronic liver or kidney disease, talk with your healthcare provider before using these medicines. Also talk with your provider if you've had a stomach ulcer or gastrointestinal bleeding. Don't give aspirin to anyone under 18 years of age who is ill with a fever. It may cause severe liver damage. Don't use NSAIDS is you are already taking one for another condition (like arthritis) or are on aspirin (such as for heart disease or after a stroke).  If medicine for vomiting or diarrhea are prescribed, take these only as directed. Nausea and diarrhea medicines are generally OK unless you have bleeding, fever, or severe abdominal pain.  Diet  Follow these guidelines for food:    Water and liquids are important so you don't get dehydrated. Drink a small amount at a time or suck on ice chips if you are vomiting.    If you eat, avoid fatty, greasy, spicy, or fried foods.    Don't eat dairy if you have diarrhea. This can make diarrhea worse.    Avoid tobacco, alcohol, and caffeine which may worsen symptoms.  During the first 24 hours (the first full day), follow the diet below:    Beverages. Sports drinks, soft drinks without caffeine, ginger ale, mineral water (plain or flavored), decaffeinated tea and coffee. If you are very dehydrated, sports drinks aren't a good choice. They have too much sugar and not enough electrolytes. In this case, commercially available products called oral rehydration solutions, are best.    Soups. Eat clear broth, consommé, and bouillon.    Desserts. Eat gelatin, ice pops, and fruit juice bars.  During the next 24 hours (the second day), you may add the following to the above:    Hot cereal, plain toast, bread, rolls, and crackers    Plain noodles, rice, mashed potatoes, chicken noodle or rice soup    Unsweetened canned fruit (avoid pineapple), bananas    Limit fat intake to less than 15 grams  per day. Do this by avoiding margarine, butter, oils, mayonnaise, sauces, gravies, fried foods, peanut butter, meat, poultry, and fish.    Limit fiber and avoid raw or cooked vegetables, fresh fruits (except bananas), and bran cereals.    Limit caffeine and chocolate. Don't use spices or seasonings other than salt.    Limit dairy products.    Avoid alcohol.  During the next 24 hours:    Gradually resume a normal diet as you feel better and your symptoms improve.    If at any time it starts getting worse again, go back to clear liquids until you feel better.  Follow-up care  Follow up with your healthcare provider, or as advised. Call your provider if you don't get better within 24 hours or if diarrhea lasts more than a week. Also follow up if you are unable to keep down liquids and get dehydrated. If a stool (diarrhea) sample was taken, call as directed for the results.  Call 911  Call 911 if any of these occur:    Trouble breathing    Chest pain    Confused    Severe drowsiness or trouble awakening    Fainting or loss of consciousness    Rapid heart rate    Seizure    Stiff neck   When to seek medical advice  Call your healthcare provider right away if any of these occur:    Abdominal pain that gets worse    Continued vomiting (unable to keep liquids down)    Frequent diarrhea (more than 5 times a day)    Blood in vomit or stool (black or red color)    Dark urine, reduced urine output, or extreme thirst    Weakness or dizziness    Drowsiness    Fever of 100.4 F (38 C) or higher, or as directed by your healthcare provider    New rash  Date Last Reviewed: 6/1/2018 2000-2018 [employer]. [address] [place] [address], [place] [address]. All rights reserved. This information is not intended as a substitute for professional medical care. Always follow your healthcare professional's instructions.

## 2019-04-26 ENCOUNTER — DOCUMENTATION ONLY (OUTPATIENT)
Dept: SLEEP MEDICINE | Facility: CLINIC | Age: 49
End: 2019-04-26

## 2019-04-26 NOTE — PROGRESS NOTES
30 DAY STM VISIT    Diagnostic AHI: 100 PSG    Message left for patient to return call     Assessment: Pt meeting objective benchmarks.     Action plan: waiting for patient to return call.  and pt to have 6 month STM visit  Patient has scheduled a follow up visit with Dr. Leigh on 5/24/19.   Device type: Auto-CPAP  PAP settings: CPAP min 7.0 cm  H20     CPAP max 15.0 cm  H20    95th% pressure 12.6 cm  H20   Mask type:  Full Face Mask  Objective measures: 14 day rolling measures      Compliance  85 %      Leak  0.32 lpm  last  upload      AHI 1.73   last  upload      Average number of minutes 342      Objective measure goal  Compliance   Goal >70%  Leak   Goal < 24 lpm  AHI  Goal < 5  Usage  Goal >240

## 2019-04-26 NOTE — PROGRESS NOTES
Patient returned call.     Subjective measures: Pt states things are going well and has no issues or complaints.  Pt is benefiting from therapy.  Patient meeting subjective benchmarks.     Action plan:pt to have 6 month [facility] visit

## 2019-05-21 ENCOUNTER — OFFICE VISIT (OUTPATIENT)
Dept: SLEEP MEDICINE | Facility: CLINIC | Age: 49
End: 2019-05-21
Payer: COMMERCIAL

## 2019-05-21 VITALS
BODY MASS INDEX: 40.43 KG/M2 | DIASTOLIC BLOOD PRESSURE: 89 MMHG | HEIGHT: 74 IN | WEIGHT: 315 LBS | HEART RATE: 94 BPM | SYSTOLIC BLOOD PRESSURE: 135 MMHG | OXYGEN SATURATION: 96 %

## 2019-05-21 DIAGNOSIS — G47.33 OSA (OBSTRUCTIVE SLEEP APNEA): Primary | ICD-10-CM

## 2019-05-21 PROCEDURE — 99213 OFFICE O/P EST LOW 20 MIN: CPT | Performed by: INTERNAL MEDICINE

## 2019-05-21 ASSESSMENT — MIFFLIN-ST. JEOR: SCORE: 2527.59

## 2019-05-21 NOTE — PATIENT INSTRUCTIONS
MY TREATMENT INFORMATION FOR SLEEP APNEA-  Gonzales Melo    MY CONTACT NUMBERS ARE:  [phone]  DOCTOR : [place] [facility]  SLEEP CENTER :  [facility]    Your sleep apnea is controlled with auto-CPAP.   Mask and supplies are ordered    Continue use of CPAP:  - Recommend using CPAP every night for at least 7-8 hours each night  - Daytime naps are not advised, but use CPAP if taking naps.    - Do not remove mask headgear when you go to bathroom at night, but just unplug the hose.    Objective measure goal  Compliance  Goal >70% (preferrably 100%)  Leak   Goal < 10% (less than 24 L/min)  AHI  Goal < 5 events per hour   Usage  Goal 7-8 hours.      Patient was advised not to drive if drowsy or sleepy.      Follow up in 12 months.    Your blood pressure was checked while you were in clinic today.  Please read the guidelines below about what these numbers mean and what you should do about them.  Your systolic blood pressure is the top number.  This is the pressure when the heart is pumping.  Your diastolic blood pressure is the bottom number.  This is the pressure in between beats.  If your systolic blood pressure is less than 120 and your diastolic blood pressure is less than 80, then your blood pressure is normal. There is nothing more that you need to do about it  If your systolic blood pressure is 120-139 or your diastolic blood pressure is 80-89, your blood pressure may be higher than it should be.  You should have your blood pressure re-checked within a year by a primary care provider.  If your systolic blood pressure is 140 or greater or your diastolic blood pressure is 90 or greater, you may have high blood pressure.  High blood pressure is treatable, but if left untreated over time it can put you at risk for heart attack, stroke, or kidney failure.  You should have your blood pressure re-checked by a primary care provider within the next four weeks.  Your BMI is Body mass index is 44.92 kg/m .  Weight  management is a personal decision.  If you are interested in exploring weight loss strategies, the following discussion covers the approaches that may be successful. Body mass index (BMI) is one way to tell whether you are at a healthy weight, overweight, or obese. It measures your weight in relation to your height.  A BMI of 18.5 to 24.9 is in the healthy range. A person with a BMI of 25 to 29.9 is considered overweight, and someone with a BMI of 30 or greater is considered obese. More than two-thirds of American adults are considered overweight or obese.  Being overweight or obese increases the risk for further weight gain. Excess weight may lead to heart disease and diabetes.  Creating and following plans for healthy eating and physical activity may help you improve your health.  Weight control is part of healthy lifestyle and includes exercise, emotional health, and healthy eating habits. Careful eating habits lifelong are the mainstay of weight control. Though there are significant health benefits from weight loss, long-term weight loss with diet alone may be very difficult to achieve- studies show long-term success with dietary management in less than 10% of people. Attaining a healthy weight may be especially difficult to achieve in those with severe obesity. In some cases, medications, devices and surgical management might be considered.  What can you do?  If you are overweight or obese and are interested in methods for weight loss, you should discuss this with your provider.     Consider reducing daily calorie intake by 500 calories.     Keep a food journal.     Avoiding skipping meals, consider cutting portions instead.    Diet combined with exercise helps maintain muscle while optimizing fat loss. Strength training is particularly important for building and maintaining muscle mass. Exercise helps reduce stress, increase energy, and improves fitness. Increasing exercise without diet control, however, may  not burn enough calories to loose weight.       Start walking three days a week 10-20 minutes at a time    Work towards walking thirty minutes five days a week     Eventually, increase the speed of your walking for 1-2 minutes at time    In addition, we recommend that you review healthy lifestyles and methods for weight loss available through the National Institutes of Health patient information sites:  http://win.niddk.nih.gov/publications/index.htm    And look into health and wellness programs that may be available through your health insurance provider, employer, local community center, or jere club.

## 2019-05-21 NOTE — NURSING NOTE
"Chief Complaint   Patient presents with     RECHECK     f/u pap.  Dr. Leigh patient       Initial /89   Pulse 94   Ht 1.88 m (6' 2.02\")   Wt (!) 158.8 kg (350 lb)   SpO2 96%   BMI 44.92 kg/m   Estimated body mass index is 44.92 kg/m  as calculated from the following:    Height as of this encounter: 1.88 m (6' 2.02\").    Weight as of this encounter: 158.8 kg (350 lb).    Medication Reconciliation: complete        DME: yes [facility] 10    ESS2  "

## 2019-05-21 NOTE — PROGRESS NOTES
[facility]  [facility] Medicine Follow-up  May 21, 2019      Name: Gonzales Melo MRN# [id number]   Age: 48 year old YOB: 1970   Date of visit: May 21, 2019  Primary care provider: Kandy Duff         Assessment and Plan:     1. Obstructive Sleep Apnea:  - Full compliance of PAP use with low residual AHI.  - Continue current PAP pressure as below.  - PAP Machine download is reviewed as below  - Mask and supplies are renewed  - Clinical response: good  - Recommend using CPAP every night for at least 7-8 hours each night  - Daytime naps are not advised, but use CPAP if taking naps  - Patient was advised not to drive if drowsy or sleepy.  - Follow up in sleep clinic in 12 months.    2. Obesity: Encouraged patient to lose weight. Counseled regarding weight loss through diet modification and increased physical activity. Patient was given nstuctions of weight loss and advised to follow up her PCP for further weight loss interventions. Weight loss instructions given.      No orders of the defined types were placed in this encounter.        Summary Counseling:  New sleep schedule recommendation: given    Check out [email or web address]    All questions were answered.  The patient indicates understanding of the above issues and agrees with the plan set forth.           Chief Complaint:    Routine Follow up            History of Present Illness:     Gonzales Melo is a 48 year old male with history of severe obstructive sleep apnea, asthma and obesity who comes to [facility] for follow up. Please see H&P for his presenting sleep symptoms for additional details.  Patient is diagnosed sleep apnea on severe obstructive sleep apnea with sleep related hypoxemia and hypoventilation 3/15/2019 and was prescribed auto-CPAP pressure of 7-15 cmH2O, and was setup at PAP therapy on 3/26/2019.   He is using his PAP therapy and has good benefits throughout with his lab  work from 23 down to 2.  He is short sleeper due to his night shift walker. He denies snoring with CPAP machine use.      PREVIOUS SLEEP STUDIES:  Date: 3/15/19  AHI: 100 /hr  Intervention: CPAP  Lowest O2 saturation: 73.8 %  Time spent below 88% oxygen saturation was 62.9 minutes.    CPAP Compliance:  Dates:  4/21/2019- 5/20/2019       83 % >4hour use   Days used: 29/30  Average daily use (days used): 5.44 hrs  Leak 95 percentile: 0.8 L/min  Residual AHI: 0.7/hr  Pressure:  7-15 cmH2O  95% (90%) percentile pressure: 12.1 cmH2O (max pressure 13)    [place] Sleepiness Scale score today: 2/24  Pre-PAP [place] Sleepiness Scale score: 23/24    PAP machine: ResMed    Interface:  Mask: Full facemask  Mask comfort:  Chin strap: No  Leak: No  Humidity: Yes    Difficulties with therapy:    [-] Difficulty tolerating the pressure  [-] Epistaxis:   [-] Nasal congestion   [x] Dry mouth: occasional  [x] Mouth breathing:   [-] Pain/skin breakdown:     Improvements noted with CPAP:   [+] waking up more refreshed  [+] sleeping better with less arousals  [+] nocturia improved   [+] improved energy level during the day    SLEEP-WAKE SCHEDULE: unchanged, night shifter    Other sleep problems: None     Drowsy driving / near incidents: No    Medications that affect sleep: No            Medications:     Current Outpatient Medications   Medication Sig     albuterol (PROAIR HFA/PROVENTIL HFA/VENTOLIN HFA) 108 (90 Base) MCG/ACT inhaler Inhale 1-2 puffs into the lungs every 4 hours as needed for shortness of breath / dyspnea or wheezing (Patient not taking: Reported on 4/19/2019)     fluticasone (FLONASE) 50 MCG/ACT spray Spray 1-2 sprays into both nostrils daily (Patient not taking: Reported on 4/19/2019)     ipratropium - albuterol 0.5 mg/2.5 mg/3 mL (DUONEB) 0.5-2.5 (3) MG/3ML neb solution Take 1 vial (3 mLs) by nebulization every 6 hours as needed for shortness of breath / dyspnea (Patient not taking: Reported on 4/19/2019)      "ondansetron (ZOFRAN-ODT) 8 MG ODT tab Take 1 tablet (8 mg) by mouth every 8 hours as needed for nausea     No current facility-administered medications for this visit.         Allergies   Allergen Reactions     Mucinex Other (See Comments) and Hives     lip swelling            Past Medical History:     Past Medical History:   Diagnosis Date     Asthma, mild intermittent      Obesity 1/30/2014     Plantar fasciitis 1/30/2014             Past Surgical History:      Past Surgical History:   Procedure Laterality Date     COLONOSCOPY  9/30/2015    Dr. Son [facility]     COLONOSCOPY N/A 9/30/2015    Procedure: COLONOSCOPY;  Surgeon: Marvin Son MD;  Location:  GI       Social History     Tobacco Use     Smoking status: Never Smoker     Smokeless tobacco: Never Used   Substance Use Topics     Alcohol use: No       Family History   Problem Relation Age of Onset     Family History Negative Mother      Hypertension Maternal Grandmother      Heart Disease Brother             Physical Examination:   /89   Pulse 94   Ht 1.88 m (6' 2.02\")   Wt (!) 158.8 kg (350 lb)   SpO2 96%   BMI 44.92 kg/m              Data: All pertinent previous laboratory data reviewed     Lab Results   Component Value Date    PH 7.41 03/16/2019    PO2 86 03/16/2019    PCO2 46 (H) 03/16/2019    HCO3 29 (H) 03/16/2019    ANTON 3.7 03/16/2019     Lab Results   Component Value Date    TSH 2.05 08/15/2007     Lab Results   Component Value Date     (H) 01/12/2017    GLC 99 07/13/2012     Lab Results   Component Value Date    HGB 14.9 07/13/2012    HGB 14.0 09/14/2006     Lab Results   Component Value Date    BUN 16 06/29/2010    BUN 14 09/26/2007    CR 1.09 06/29/2010    CR 1.05 09/26/2007     No results found for: FELICITY      He will follow up with me in about 12 month(s).         Copy to: Kandy Duff MD 5/21/2019     [facility]  [address] [place] [address]       Fifteen minutes " spent with patient, all of which were spent face-to-face counseling, consulting, coordinating plan of care and going over PAP download/sleep study and chart review.

## 2019-09-23 ENCOUNTER — DOCUMENTATION ONLY (OUTPATIENT)
Dept: SLEEP MEDICINE | Facility: CLINIC | Age: 49
End: 2019-09-23

## 2019-09-23 NOTE — PROGRESS NOTES
6 month [facility] Recheck Visit     Diagnostic AHI: 100  PSG     Data only recheck     Assessment: Pt meeting objective benchmarks.     Action plan:   pt to follow up per provider request       Device type: Auto-CPAP ()  PAP settings: CPAP min 7.0 (Minimum allowable pressure in cmH2O) cm  H20     CPAP max 15.0 (Maximum allowable pressure in cmH2O) cm  H20        95th% pressure 11.1 cm  H20   Objective measures: 14 day rolling measures      Compliance  92 %      Leak  9.28 lpm  last  upload      AHI 0.81   last  upload      Average number of minutes 378      Objective measure goal  Compliance   Goal >70%  Leak   Goal < 24 lpm  AHI  Goal < 5  Usage  Goal >240

## 2019-10-01 ENCOUNTER — HEALTH MAINTENANCE LETTER (OUTPATIENT)
Age: 49
End: 2019-10-01

## 2019-10-07 ENCOUNTER — HOSPITAL ENCOUNTER (EMERGENCY)
Facility: CLINIC | Age: 49
Discharge: HOME | End: 2019-10-07
Attending: EMERGENCY MEDICINE | Admitting: EMERGENCY MEDICINE
Payer: COMMERCIAL

## 2019-10-07 VITALS
RESPIRATION RATE: 16 BRPM | DIASTOLIC BLOOD PRESSURE: 82 MMHG | SYSTOLIC BLOOD PRESSURE: 143 MMHG | OXYGEN SATURATION: 95 % | TEMPERATURE: 98.4 F

## 2019-10-07 DIAGNOSIS — L03.317 CELLULITIS OF GLUTEAL REGION: ICD-10-CM

## 2019-10-07 PROCEDURE — 10160 PNXR ASPIR ABSC HMTMA BULLA: CPT

## 2019-10-07 PROCEDURE — 99284 EMERGENCY DEPT VISIT MOD MDM: CPT | Mod: 25

## 2019-10-07 RX ORDER — LIDOCAINE HYDROCHLORIDE AND EPINEPHRINE 10; 10 MG/ML; UG/ML
INJECTION, SOLUTION INFILTRATION; PERINEURAL
Status: DISCONTINUED
Start: 2019-10-07 | End: 2019-10-07 | Stop reason: HOSPADM

## 2019-10-07 RX ORDER — CEPHALEXIN 500 MG/1
500 CAPSULE ORAL 4 TIMES DAILY
Qty: 40 CAPSULE | Refills: 0 | Status: SHIPPED | OUTPATIENT
Start: 2019-10-07 | End: 2019-11-08

## 2019-10-07 ASSESSMENT — ENCOUNTER SYMPTOMS
FEVER: 0
ROS SKIN COMMENTS: ABSCESS

## 2019-10-07 NOTE — DISCHARGE INSTRUCTIONS
Return in 48 -72 hours if no improvement with antibiotics. Warm sitz baths with warm water and distilled vinegar or warm water and epsom salt.

## 2019-10-07 NOTE — ED AVS SNAPSHOT
[facility] Emergency Department  [address]  [facility] [address]  Phone:  [phone]  Fax:  [phone]                                    Gonzales Melo   MRN: [id number]    Department:  [facility] Emergency Department   Date of Visit:  10/7/2019           After Visit Summary Signature Page    I have received my discharge instructions, and my questions have been answered. I have discussed any challenges I see with this plan with the nurse or doctor.    ..........................................................................................................................................  Patient/Patient Representative Signature      ..........................................................................................................................................  Patient Representative Print Name and Relationship to Patient    ..................................................               ................................................  Date                                   Time    ..........................................................................................................................................  Reviewed by Signature/Title    ...................................................              ..............................................  Date                                               Time          22EPIC Rev 08/18

## 2019-10-07 NOTE — ED PROVIDER NOTES
History     Chief Complaint:    Abscess     HPI   Gonzales Melo is a 48 year old male who presents with abscess. The patient reports that 4-5 days ago he started to develop a lump on his left buttock that has been growing since. Since 2 days ago, the lump has doubled in size. The patient states that his wife attempted to aspirate it with a needle without success and they incised it with only some blood expressed. He denies fever or history of abscesses.     Allergies:  Mucinex      Medications:    Albuterol  Flonase     Past Medical History:    Asthma  Obesity   Plantar fascitis   Hypertriglyceridemia  CHEPE    Past Surgical History:    Colonoscopy     Family History:    Brother: heart disease  Maternal grandmother: hypertension     Social History:  The patient was accompanied to the ED by wife.  Smoking Status: Never Smoker  Smokeless Tobacco: Never Used  Alcohol Use: Negative   Drug Use: Negative  PCP: Kandy Duff   Marital Status:        Review of Systems   Constitutional: Negative for fever.   Skin:        Abscess    All other systems reviewed and are negative.    Physical Exam     Patient Vitals for the past 24 hrs:   BP Temp Temp src Heart Rate Resp SpO2   10/07/19 1603 (!) 143/82 98.4  F (36.9  C) Oral 104 16 95 %      Physical Exam  Cardiovascular:      Rate and Rhythm: Normal rate.   Pulmonary:      Effort: Pulmonary effort is normal.   Musculoskeletal: Normal range of motion.   Neurological:      Mental Status: He is alert.       Left buttock there is a round 2 cm area of erythema and induration over the left gluteal cheek.  There is a purplish colored wound of the tip of the small incision jones from prior incision.  There is no drainage.  There is no open wound other than incision.  It involves the gluteus but not the rectum or anywhere near the anal verge.    Emergency Department Course     Procedures:    Procedure: I&D   Performed by: Jp Bernstein MD    LOCATION:  Left Buttock        ANESTHESIA:  Local field block using 1% Lidocaine with epinephrine, total of 8 mLs    PREPARATION:  Cleansed with Betadine    PROCEDURE:  Area was aspirated with an 18 gauge needle, three attempts were made without successful expression of drainage. Procedure ultimately aborted after 3rd unsuccessful attempt. Appropriate dressing was applied to cover the area.    Emergency Department Course:    1603 Nursing notes and vitals reviewed. I performed an exam of the patient as documented above.     1615 Procedure started.     1647 Prior to discharge, I personally reviewed the results with the patient and all related questions were answered. The patient verbalized understanding and is amenable to plan.     Impression & Plan      Medical Decision Making:  Gonzales Melo is a 48 year old male who presents to the emergency department today for evaluation of gluteal pain.  Examination is consistent with likely a small abscess.  Ultrasound fails to reveal a clear pocket though due to its examination findings I did recommend an attempted aspiration.  This is performed and unable to produce pus.  Based on his history I decided to treat with antibiotics warm soaks and return with worsening condition for reattempt to re-ultrasound.    Diagnosis:    ICD-10-CM    1. Cellulitis of gluteal region L03.317      Disposition:   The patient is discharged to home.     Discharge Medications:  New Prescriptions    CEPHALEXIN (KEFLEX) 500 MG CAPSULE    Take 1 capsule (500 mg) by mouth 4 times daily for 10 days     Scribe Disclosure:  I, Orla Severson, am serving as a scribe at 4:03 PM on 10/7/2019 to document services personally performed by Jp Bernstein MD based on my observations and the provider's statements to me.   [facility] EMERGENCY DEPARTMENT       Jp Bernstein MD  10/08/19 0911

## 2019-11-08 ENCOUNTER — HOSPITAL ENCOUNTER (EMERGENCY)
Facility: CLINIC | Age: 49
Discharge: HOME | End: 2019-11-08
Attending: EMERGENCY MEDICINE | Admitting: EMERGENCY MEDICINE
Payer: OTHER MISCELLANEOUS

## 2019-11-08 VITALS
SYSTOLIC BLOOD PRESSURE: 132 MMHG | BODY MASS INDEX: 39.78 KG/M2 | DIASTOLIC BLOOD PRESSURE: 80 MMHG | TEMPERATURE: 97.8 F | HEIGHT: 74 IN | OXYGEN SATURATION: 98 % | WEIGHT: 310 LBS

## 2019-11-08 DIAGNOSIS — S76.011A HIP STRAIN, RIGHT, INITIAL ENCOUNTER: ICD-10-CM

## 2019-11-08 DIAGNOSIS — S16.1XXA STRAIN OF NECK MUSCLE, INITIAL ENCOUNTER: ICD-10-CM

## 2019-11-08 DIAGNOSIS — M54.9 UPPER BACK PAIN ON RIGHT SIDE: ICD-10-CM

## 2019-11-08 DIAGNOSIS — M54.50 ACUTE RIGHT-SIDED LOW BACK PAIN WITHOUT SCIATICA: ICD-10-CM

## 2019-11-08 DIAGNOSIS — S40.011A CONTUSION OF RIGHT SHOULDER, INITIAL ENCOUNTER: ICD-10-CM
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
## 2019-11-08 PROCEDURE — 99282 EMERGENCY DEPT VISIT SF MDM: CPT

## 2019-11-08 ASSESSMENT — ENCOUNTER SYMPTOMS
MYALGIAS: 1
ARTHRALGIAS: 1
HEADACHES: 0
NUMBNESS: 0
BACK PAIN: 1

## 2019-11-08 ASSESSMENT — MIFFLIN-ST. JEOR: SCORE: 2340.9

## 2019-11-08 NOTE — ED AVS SNAPSHOT
Emergency Department  [address] [place] [address]  Phone:  [phone]  Fax:  [phone]                                    Gonzales Melo   MRN: [id number]    Department:   Emergency Department   Date of Visit:  11/8/2019           After Visit Summary Signature Page    I have received my discharge instructions, and my questions have been answered. I have discussed any challenges I see with this plan with the nurse or doctor.    ..........................................................................................................................................  Patient/Patient Representative Signature      ..........................................................................................................................................  Patient Representative Print Name and Relationship to Patient    ..................................................               ................................................  Date                                   Time    ..........................................................................................................................................  Reviewed by Signature/Title    ...................................................              ..............................................  Date                                               Time          22EPIC Rev 08/18
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
## 2019-12-19 NOTE — PROGRESS NOTES
Pre-Visit Planning     Future Appointments   Date Time Provider Department Center   12/23/2019  8:00 AM Laura Montalvo MD [facility]     Arrival Time for this Appointment:  7:40 AM   Appointment Notes for this encounter:   **ACT** Lower Back Issues    Questionnaires Reviewed/Assigned  No additional questionnaires are needed      Patient preferred phone number: [phone]    Unable to reach patient and unable to leave voicemail.     Edward George, EMT/Clinic Health Guide

## 2019-12-23 ENCOUNTER — OFFICE VISIT (OUTPATIENT)
Dept: FAMILY MEDICINE | Facility: CLINIC | Age: 49
End: 2019-12-23
Payer: COMMERCIAL

## 2019-12-23 VITALS
OXYGEN SATURATION: 96 % | WEIGHT: 310 LBS | BODY MASS INDEX: 39.8 KG/M2 | SYSTOLIC BLOOD PRESSURE: 133 MMHG | DIASTOLIC BLOOD PRESSURE: 85 MMHG | HEART RATE: 95 BPM | TEMPERATURE: 98 F

## 2019-12-23 DIAGNOSIS — Z13.220 LIPID SCREENING: ICD-10-CM

## 2019-12-23 DIAGNOSIS — M21.42 PES PLANUS OF BOTH FEET: ICD-10-CM

## 2019-12-23 DIAGNOSIS — M21.41 PES PLANUS OF BOTH FEET: ICD-10-CM

## 2019-12-23 DIAGNOSIS — R73.03 PREDIABETES: Primary | ICD-10-CM

## 2019-12-23 DIAGNOSIS — M25.561 ARTHRALGIA OF BOTH LOWER LEGS: ICD-10-CM

## 2019-12-23 DIAGNOSIS — Z71.3 ENCOUNTER FOR MONITORING OF KETOGENIC DIET: ICD-10-CM

## 2019-12-23 DIAGNOSIS — R76.8 ELEVATED RHEUMATOID FACTOR: ICD-10-CM

## 2019-12-23 DIAGNOSIS — M25.562 ARTHRALGIA OF BOTH LOWER LEGS: ICD-10-CM

## 2019-12-23 LAB
ALBUMIN SERPL-MCNC: 3.2 G/DL (ref 3.4–5)
ALP SERPL-CCNC: 98 U/L (ref 40–150)
ALT SERPL W P-5'-P-CCNC: 28 U/L (ref 0–70)
ANION GAP SERPL CALCULATED.3IONS-SCNC: 4 MMOL/L (ref 3–14)
AST SERPL W P-5'-P-CCNC: 12 U/L (ref 0–45)
BILIRUB SERPL-MCNC: 0.2 MG/DL (ref 0.2–1.3)
BUN SERPL-MCNC: 24 MG/DL (ref 7–30)
CALCIUM SERPL-MCNC: 9.9 MG/DL (ref 8.5–10.1)
CHLORIDE SERPL-SCNC: 108 MMOL/L (ref 94–109)
CHOLEST SERPL-MCNC: 237 MG/DL
CO2 SERPL-SCNC: 28 MMOL/L (ref 20–32)
CREAT SERPL-MCNC: 0.78 MG/DL (ref 0.66–1.25)
ERYTHROCYTE [SEDIMENTATION RATE] IN BLOOD BY WESTERGREN METHOD: 9 MM/H (ref 0–15)
GFR SERPL CREATININE-BSD FRML MDRD: >90 ML/MIN/{1.73_M2}
GLUCOSE SERPL-MCNC: 117 MG/DL (ref 70–99)
HBA1C MFR BLD: 5.6 % (ref 0–5.6)
HDLC SERPL-MCNC: 35 MG/DL
LDLC SERPL CALC-MCNC: 153 MG/DL
NONHDLC SERPL-MCNC: 202 MG/DL
POTASSIUM SERPL-SCNC: 3.8 MMOL/L (ref 3.4–5.3)
PROT SERPL-MCNC: 7.3 G/DL (ref 6.8–8.8)
RHEUMATOID FACT SER NEPH-ACNC: 44 IU/ML (ref 0–20)
SODIUM SERPL-SCNC: 140 MMOL/L (ref 133–144)
TRIGL SERPL-MCNC: 246 MG/DL

## 2019-12-23 PROCEDURE — 80053 COMPREHEN METABOLIC PANEL: CPT | Performed by: FAMILY MEDICINE

## 2019-12-23 PROCEDURE — 99214 OFFICE O/P EST MOD 30 MIN: CPT | Performed by: FAMILY MEDICINE

## 2019-12-23 PROCEDURE — 80061 LIPID PANEL: CPT | Performed by: FAMILY MEDICINE

## 2019-12-23 PROCEDURE — 83036 HEMOGLOBIN GLYCOSYLATED A1C: CPT | Performed by: FAMILY MEDICINE

## 2019-12-23 PROCEDURE — 86431 RHEUMATOID FACTOR QUANT: CPT | Performed by: FAMILY MEDICINE

## 2019-12-23 PROCEDURE — 36415 COLL VENOUS BLD VENIPUNCTURE: CPT | Performed by: FAMILY MEDICINE

## 2019-12-23 PROCEDURE — 85652 RBC SED RATE AUTOMATED: CPT | Performed by: FAMILY MEDICINE

## 2019-12-23 ASSESSMENT — ASTHMA QUESTIONNAIRES
QUESTION_1 LAST FOUR WEEKS HOW MUCH OF THE TIME DID YOUR ASTHMA KEEP YOU FROM GETTING AS MUCH DONE AT WORK, SCHOOL OR AT HOME: NONE OF THE TIME
ACT_TOTALSCORE: 25
QUESTION_2 LAST FOUR WEEKS HOW OFTEN HAVE YOU HAD SHORTNESS OF BREATH: NOT AT ALL
QUESTION_3 LAST FOUR WEEKS HOW OFTEN DID YOUR ASTHMA SYMPTOMS (WHEEZING, COUGHING, SHORTNESS OF BREATH, CHEST TIGHTNESS OR PAIN) WAKE YOU UP AT NIGHT OR EARLIER THAN USUAL IN THE MORNING: NOT AT ALL
QUESTION_4 LAST FOUR WEEKS HOW OFTEN HAVE YOU USED YOUR RESCUE INHALER OR NEBULIZER MEDICATION (SUCH AS ALBUTEROL): NOT AT ALL
QUESTION_5 LAST FOUR WEEKS HOW WOULD YOU RATE YOUR ASTHMA CONTROL: COMPLETELY CONTROLLED

## 2019-12-23 NOTE — PROGRESS NOTES
Subjective     Gonzales Melo is a 49 year old male who presents to clinic today for the following health issues:    Musculoskeletal Problem     History of Present Illness        He eats 2-3 servings of fruits and vegetables daily.He consumes 0 sweetened beverage(s) daily.  He is taking medications regularly.     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 Reports the pain starts from his knee on down.   When away from work for about 3-4 days the pain is better. Has tried new shoes without improvement.   Pain improved with rest.   In the past was seen by a rheumatologist for bilateral hand pain- although his RA factor was negative he was started on methotrexate which he took for several months. He has not been on this in many years.   Positive Family history of RA- maternal grandmother.       Currently on Keto diet- since August and has lost about 40 lbs.   At his last visit he was noted to be pre-diabetic and would like to have his A1c rechecked.   He understands keto is not sustainable for him so he would like to discuss other ways to maintain his diet.       Also reports URI for the last week.   Therapies tried- dayquil, sudafed.   Denies any fever, chills, teeth pain, headaches, nausea or vomiting.       Wt Readings from Last 4 Encounters:   12/23/19 140.6 kg (310 lb)   11/08/19 140.6 kg (310 lb)   05/21/19 (!) 158.8 kg (350 lb)   04/19/19 (!) 152.9 kg (337 lb)               Patient Active Problem List   Diagnosis     Asthma,  mild intermittent     CARDIOVASCULAR SCREENING; LDL GOAL LESS THAN 160     Obesity     Hypertriglyceridemia     Plantar fasciitis     Morbid obesity (H)     Pes planus of both feet     Past Surgical History:   Procedure Laterality Date     COLONOSCOPY  9/30/2015    Dr. Son [facility]     COLONOSCOPY N/A 9/30/2015    Procedure: COLONOSCOPY;  Surgeon: Marvin Son MD;  Location:  GI       Social History     Tobacco Use     Smoking status: Never Smoker     Smokeless tobacco: Never Used   Substance Use Topics     Alcohol use: No     Family History   Problem Relation Age of Onset     Family History Negative Mother      Hypertension Maternal Grandmother      Rheumatoid Arthritis Maternal Grandmother      Heart Disease Brother            Reviewed and updated as needed this visit by Provider  Tobacco  Allergies  Meds  Problems  Med Hx  Surg Hx  Fam Hx         Review of Systems   ROS COMP: Constitutional, HEENT, cardiovascular, pulmonary, GI, , musculoskeletal, neuro, skin, endocrine and psych systems are negative, except as otherwise noted.      Objective    /85 (BP Location: Right arm, Patient Position: Chair, Cuff Size: Adult Large)   Pulse 95   Temp 98  F (36.7  C) (Oral)   Wt 140.6 kg (310 lb)   SpO2 96%   BMI 39.80 kg/m    Body mass index is 39.8 kg/m .  Physical Exam   GENERAL: healthy, alert and no distress  EYES: Eyes grossly normal to inspection, PERRL and conjunctivae and sclerae normal  HENT: ear canals and TM's normal, nose and mouth without ulcers or lesions  NECK: no adenopathy  RESP: lungs clear to auscultation - no rales, rhonchi or wheezes  CV: regular rate and rhythm, normal S1 S2  ABDOMEN: soft, nontender,  no masses and bowel sounds normal  MS: pes planus b/l; mild TTP of toes with weight bearing  PSYCH: mentation appears normal, affect normal/bright    Diagnostic Test Results:  Labs reviewed in Epic  none         Assessment & Plan     1. Prediabetes  - Comprehensive metabolic  "panel  - Hemoglobin A1c    2. Lipid screening  - Lipid panel reflex to direct LDL Fasting    3. Encounter for monitoring of ketogenic diet  - discussed other ways of staying in shape as he considers transitioning off keto.     4. Arthralgia of both lower legs  - multifactorial. Supportive care reviewed   - Erythrocyte sedimentation rate auto  - Rheumatoid factor    5. Pes planus of both feet  - PODIATRY/FOOT & ANKLE SURGERY REFERRAL     BMI:   Estimated body mass index is 39.8 kg/m  as calculated from the following:    Height as of 11/8/19: 1.88 m (6' 2\").    Weight as of this encounter: 140.6 kg (310 lb).   Weight management plan: Discussed healthy diet and exercise guidelines        See Patient Instructions    No follow-ups on file.    Laura Montalvo MD  [facility]"

## 2019-12-23 NOTE — PATIENT INSTRUCTIONS
Increase fluids  Recommend zyrtec D, claritin D or allegra D  Use flonase or nasonex daily   For foods recommend an anti-inflammatory diet- incorporate more fish- salmon, cod, sardines, herbs, spices- tumeric, diego

## 2019-12-24 ASSESSMENT — ASTHMA QUESTIONNAIRES: ACT_TOTALSCORE: 25

## 2019-12-31 ENCOUNTER — OFFICE VISIT (OUTPATIENT)
Dept: PODIATRY | Facility: CLINIC | Age: 49
End: 2019-12-31
Payer: COMMERCIAL

## 2019-12-31 VITALS
HEIGHT: 74 IN | SYSTOLIC BLOOD PRESSURE: 128 MMHG | DIASTOLIC BLOOD PRESSURE: 76 MMHG | BODY MASS INDEX: 39.78 KG/M2 | WEIGHT: 310 LBS

## 2019-12-31 DIAGNOSIS — G57.62 NEUROMA OF SECOND INTERSPACE OF LEFT FOOT: Primary | ICD-10-CM

## 2019-12-31 DIAGNOSIS — M25.572 BILATERAL ANKLE PAIN, UNSPECIFIED CHRONICITY: ICD-10-CM

## 2019-12-31 DIAGNOSIS — M25.571 BILATERAL ANKLE PAIN, UNSPECIFIED CHRONICITY: ICD-10-CM

## 2019-12-31 DIAGNOSIS — G57.61 NEUROMA OF SECOND INTERSPACE OF RIGHT FOOT: ICD-10-CM

## 2019-12-31 DIAGNOSIS — G57.82 NEUROMA OF THIRD INTERSPACE OF LEFT FOOT: ICD-10-CM

## 2019-12-31 DIAGNOSIS — G57.81 NEUROMA OF THIRD INTERSPACE OF RIGHT FOOT: ICD-10-CM

## 2019-12-31 PROCEDURE — 99203 OFFICE O/P NEW LOW 30 MIN: CPT | Performed by: PODIATRIST

## 2019-12-31 ASSESSMENT — MIFFLIN-ST. JEOR: SCORE: 2340.9

## 2019-12-31 NOTE — PATIENT INSTRUCTIONS
Thank you for choosing [facility] Podiatry / Foot & Ankle Surgery!    DR. HILLS'S CLINIC LOCATIONS:   MONDAY - [place] TUESDAY AM - [place]   [address] [facility]  [address]   [place] [address] [place] [address]   [phone]       THURSDAY AM - [place] THURSDAY PM - [place]   [address]   [place] [address] [place] [address]   [phone]       FRIDAY AM - [place] SET UP SURGERY: [phone] [address] APPOINTMENTS: [phone]   [place] [address] BILLING QUESTIONS: [phone] FAX NUMBER: [phone]     Follow Up: 3 weeks after getting orthotics      FYI: The following information is included in the after visit summary for all patients:  Body weight can be a sensitive issue to discuss in clinic, but we think the following information is very important. Although we focus on the feet and ankles, we do support the overall health of our patients. Many things can cause foot and ankle problems. Foot structure, activity level, foot mechanics and injuries are common causes of pain. One very important issue that often goes unmentioned, is body weight. Extra weight can cause increased stress on muscles, ligaments, bones and tendons. Sometimes just a few extra pounds is all it takes to put one over her/his threshold. Without reducing that stress, it can be difficult to alleviate pain. As Foot & Ankle specialists, our job is addressing the lower extremity problem and possible causes. Regarding extra body weight, we encourage patients to discuss diet and weight management plans with their primary care doctors. It is this team approach that gives you the best opportunity for pain relief and getting you back on your feet. [place] has a Comprehensive Weight Management Program. This program includes counseling, education, non-surgical and surgical approaches to weight loss. If you are interested in  "learning more either talk to you primary care provider or call [phone].    PRICE Therapy    Many aches and pains throughout the foot and ankle can be helped with many simple treatments.  This is usually described as PRICE Therapy.      P - Protection - often times, inflammation/pain in the lower extremity is not able to improve simply because the areas involved are never allowed to rest.  Every step we take can bother the problematic area.  Protecting those areas is an important step in the healing process.  This may involve a walking cast boot, a special insert/orthotic device, an ankle brace, or simply avoiding barefoot walking.    R - Rest - in addition to protecting the foot/ankle, resting is an important, but often times difficult, treatment option.  Getting off your feet when they bother you, and specifically avoiding activities that cause pain/discomfort, are very beneficial to prevent, and treat, foot/ankle pain.  \"If there's something that makes it hurt(eg activities, shoe gear), and you keep doing the thing that makes it hurt, it's just going to keep hurting\".      I - Ice - icing regularly can help to decrease inflammation and swelling in the foot, thus decreasing pain.  Using an ice pack or a bag of frozen peas works very well.  Ice for 20 minutes multiple times per day as needed.  Do not place the ice directly on the skin as this can cause tissue damage.    C - Compression - using a compression wrap or an ACE wrap can help to decrease swelling, which can help to decrease pain.  Wearing the wraps is generally not needed at night, but they should be worn on a regular basis when you are going to be on your feet for prolonged periods as gravity tends to pull fluids down to your feet/ankles.    E - Elevation - elevating your lower extremities multiple times daily for 15-20 minutes can help to decrease swelling, which works well in decreasing pain levels.      NSAID/Tylenol - An anti-inflammatory, like " Aleve or ibuprofen, and/or a pain medication, such as Tylenol, can help to improve pain levels and get the issue resolved sooner rather than later.  Anyone with liver issues should be careful with Tylenol, and anyone with high blood pressure or heart, stomach or kidney issues should be careful with anti-inflammatories.  Please ask if you have questions about these medications, including dosage.        GRAMAJO'S NEUROMA   Gramajo's neuroma is an enlargement or thickening of a nerve in the foot. It is also sometimes referred to as an intermetatarsal neuroma, interdigital neuroma, Gramajo's metatarsalgia (pain in the metatarsal head area), kayy-neural fibrosis (scar tissue around a nerve) or entrapment neuropathy (abnormal nerve due to compression). A Gramajo's neuroma most commonly occurs in the third interspace between the third and fourth toes, followed by the second interspace between the second and third toes. Gramajo's neuromas have also occurred in the fourth and first interspaces, but these are rare. If you have a Gramajo's neuroma, there is a 15% chance it will occur bilaterally (on both feet). Gramajo's neuromas occur most commonly in women who are between 30 to 50 years old. The reason they are more common in women is thought to be due to the shoes women wear.   CAUSES: A Gramajo's neuroma is thought to be caused by trauma to the nerve, but scientists are still not sure about the exact cause of the trauma. The trauma may be caused by the metatarsal heads, the deep transverse intermetatarsal ligament (holds the metatarsal heads together) or an intermetatarsal bursa (fluid-filled sac). All of these structures can cause compression/trauma on the nerve which initially causes swelling and injury in the nerve. Over time if the compression/trauma continues, the nerve repairs itself with very fibrous tissue that leads to enlargement and thickening of the nerve. Other causes of trauma to the nerve may include; overpronation  "(foot rolls inward), hypermobility (too much motion), cavo varus (high arch foot) and excessive dorsiflexion (toes bend upward) of the toes. These biomechanical (howthe foot moves) factors may cause trauma to the nerve with every step. If the nerve becomes irritated and enlarged then it takes up more space and gets even more compressed and irritated. It becomes a vicious cycle.   SIGNS & SYMPTOMS  - Pain (sharp, stabbing, throbbing, shooting)   - Numbness   - Tingling or \"pins & needles\"   - Burning   - Cramping   - Feeling that you are stepping on something or that something is in your shoe   - Initially the symptoms may happen once in a while, but as the condition gets     worse, the symptoms may happen all of the time   - It usually feels better by taking off your shoe and massaging your foot     DIAGNOSIS: Your podiatrist will ask many questions about your signs and symptoms and will perform a physical exam. Some of the exams may include a web space compression test. This is done by squeezing the metatarsals together with one hand and using the thumb and index finger of the other hand to compress the affected web space to reproduce the pain/symptoms. A palpable click (Shanice's click) is usually present. This test may also cause pain to shoot into the toes and that is called a Tinel's sign. Luis Angel's test involves squeezing the metatarsals together and moving the toes up and down for 30 seconds. This will usually cause pain or it will bring on your other symptoms. Carrillo's sign is positive when you stand and the affected toes spread apart. A Gramajo's neuroma is usually diagnosed based on the history and physical exam findings, but sometimes other tests such as an x-ray, ultrasound or an MRI are needed.   TREATMENT  1.  Footwear Changes: Wear shoes that are wide and deep in the toe box so they  do not put pressure on your toes and metatarsals. Avoid wearing high heels because they cause increased pressure on " the ball of your foot (forefoot).    2.  Metatarsal Pads: These help to lift and separate the metatarsal heads to take pressure off of the nerve. They are placed just behind where you feel the pain, not on top of the painful spot.   3.  Activity modification: For example, you may try swimming instead of running until your symptoms go away.   4.  Taping   5.  Icing   6.  NSAIDs (anti-inflammatories): aleve, ibuprofen, etc.   7.  Arch Supports or Orthotics: These help to control some of the abnormal motion in your feet. The abnormal motion can lead to extra torque and pressure on the nerve.   8.  Physical Therapy  9.  Cortisone Injection: Helps to decrease the size of the irritated, enlarged nerve.   10.  Sclerosing Alcohol Injection: Helps to destroy the nerve chemically, which causes permanent numbness    SURGERY  If conservative treatment does not help surgery may be needed. Surgery may involve cutting out the nerve or cutting the intermetatarsalligament. Studies have shown surgery has an 80-85% success rate.  Will result in numbness    PREVENTION  -Avoid wearing narrow, pointed toe shoes, or high heels      OVER THE COUNTER INSERTS    Most of these can be found at your local [employer], [employer]", [employer], or online:    SuperFeet   Sofsole Fit Spenco   Power Step   Walk-Fit  ([employer]) Arch Cradles       **A good high quality over the counter insert should cost around $40-$50      [facility] LOCATIONS  [facility]  [address]  [place] [address]  Phone: [phone]  Fax: [phone] [facility]  [address]  [place] [address]  Phone: [phone]   Fax: [phone]   [facility]  [address]  [place] [address]  Phone: [phone]  Fax: [phone] [facility]  [address]  [place] [address]  Phone: [phone]    Fax: [phone]   [facility]   [address]  [place] [address]  Phone: [phone]  Fax: [phone] * Please call any location listed to make an appointment for a casting/fitting. Your referral was sent to their central office and they will all have the order on file.

## 2019-12-31 NOTE — PROGRESS NOTES
"Foot & Ankle Surgery  December 31, 2019    CC: \"bilat foot, ankle, lower leg, knee pain\"    I was asked to see Gonzales Melo regarding the chief complaint by:  Dr. KATT Douglas    HPI:  Pt is a 49 year old male who presents with above complaint.  Bilateral lower extremity pain x 6-7 months.  Looking for \"reason for pain\".  Deep ache, throbbing, pain 7/10 \"the longer I'm on my feet\".  Worse with \"walking from sitting or laying, increased time on feet\".  Has tried \"Motrin, tylenol, [employer], chiropractor\" without sufficient improvement.  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  Has RA, was on methotrexate, but stopped taking as he wasn't having any issues.  Starting to have some symptoms again.    ROS:   Pos for CC.  The patient denies current nausea, vomiting, chills, fevers, belly pain, calf pain, chest pain or SOB.  Complete remainder of ROS is otherwise neg.    VITALS:    Vitals:    12/31/19 0828   BP: 128/76   Weight: 140.6 kg (310 lb)   Height: 1.88 m (6' 2\")       PMH:    Past Medical History:   Diagnosis Date     Asthma, mild intermittent      Obesity 1/30/2014     Plantar fasciitis 1/30/2014       SXHX:    Past Surgical History:   Procedure Laterality Date     COLONOSCOPY  9/30/2015    Dr. Son [facility]     COLONOSCOPY N/A 9/30/2015    Procedure: COLONOSCOPY;  Surgeon: Marvin Son MD;  Location:  GI        MEDS:    Current Outpatient Medications   Medication     albuterol (PROAIR HFA/PROVENTIL HFA/VENTOLIN HFA) 108 (90 Base) MCG/ACT inhaler     fluticasone (FLONASE) 50 MCG/ACT spray     ipratropium - albuterol 0.5 mg/2.5 mg/3 mL (DUONEB) 0.5-2.5 (3) MG/3ML neb solution     No current facility-administered medications for this visit.        ALL:     Allergies   Allergen Reactions     Mucinex Other (See Comments) and Hives     lip swelling       FMH:    Family History   Problem Relation Age " of Onset     Family History Negative Mother      Hypertension Maternal Grandmother      Rheumatoid Arthritis Maternal Grandmother      Heart Disease Brother        SocHx:    Social History     Socioeconomic History     Marital status:      Spouse name: Not on file     Number of children: Not on file     Years of education: Not on file     Highest education level: Not on file   Occupational History     Not on file   Social Needs     Financial resource strain: Not on file     Food insecurity:     Worry: Not on file     Inability: Not on file     Transportation needs:     Medical: Not on file     Non-medical: Not on file   Tobacco Use     Smoking status: Never Smoker     Smokeless tobacco: Never Used   Substance and Sexual Activity     Alcohol use: No     Drug use: No     Sexual activity: Yes     Partners: Female   Lifestyle     Physical activity:     Days per week: Not on file     Minutes per session: Not on file     Stress: Not on file   Relationships     Social connections:     Talks on phone: Not on file     Gets together: Not on file     Attends Quaker service: Not on file     Active member of club or organization: Not on file     Attends meetings of clubs or organizations: Not on file     Relationship status: Not on file     Intimate partner violence:     Fear of current or ex partner: Not on file     Emotionally abused: Not on file     Physically abused: Not on file     Forced sexual activity: Not on file   Other Topics Concern     Parent/sibling w/ CABG, MI or angioplasty before 65F 55M? Yes   Social History Narrative     Not on file           EXAMINATION:  Gen:   No apparent distress  Neuro:   A&Ox3, no deficits  Psych:    Answering questions appropriately for age and situation with normal affect  Head:    NCAT  Eye:    Visual scanning without deficit  Ear:    Response to auditory stimuli wnl  Lung:    Non-labored breathing on RA noted  Abd:    NTND per patient report  Lymph:    Neg for  pitting/non-pitting edema BLE  Vasc:    Pulses palpable, CFT minimally delayed  Neuro:    Light touch sensation intact to all sensory nerve distributions without paresthesias  Derm:    Neg for nodules, lesions or ulcerations  MSK:    Bilateral lower extremity - all interspace tender, marquis 3rd > 2nd.  Tender dorsal forefoot, but no localized inflammation, erythema, edema or indurated tissue to suggest stress fracture.  Tender medial gutter of the ankle joint.  Otherwise unremarkable today.    Calf:    Neg for redness, swelling or tenderness      Assessment:  49 year old male with  Bilateral lower extremity pain, including neuroma 3rd > 2nd interspace, dorsal forefoot pain, and ankle joint medial gutter pain      Plan:  Discussed etiologies, anatomy and options  1.  Bilateral lower extremity pain, including neuroma 3rd > 2nd interspace, dorsal forefoot pain, and ankle joint medial gutter pain  -regarding the neuroma, discussed comfortable shoe gear, inserts/orthotics and ALEXANDRU/ENSAID.  Discussed diagnostic/therapeutic steroid injections.  Patient declined.   -orthotic lab referral for custom orthotics.  Consider injections if this fails to provide sufficient relief  -regarding the ankle pain, discussed shoes, inserts/orthotics, RICE/NSAID.  Orthotic lab referral placed.  Consider diagnostic/therapeutic intra-articular steroid injection.  Discussed today but declined.    Follow up:  3 weeks after getting orthotics or sooner with acute issues      Patient's medical history was reviewed today    Body mass index is 39.8 kg/m .  Weight management plan: Patient was referred to their PCP to discuss a diet and exercise plan.        Cortes Jimenez DPM FACFAS FACFAOM  Podiatric Foot & Ankle Surgeon  [facility]  [phone]

## 2020-01-02 ENCOUNTER — TELEPHONE (OUTPATIENT)
Dept: RHEUMATOLOGY | Facility: CLINIC | Age: 50
End: 2020-01-02

## 2020-01-02 DIAGNOSIS — R89.9 ABNORMAL LABORATORY TEST: Primary | ICD-10-CM

## 2020-01-02 NOTE — TELEPHONE ENCOUNTER
Received Referral dated 12/23/19 from Dr. Marina [facility].    Sylvain does have an elevated Rheumatoid factor of 44; normal range is of <12.    Will route to Dr. Vaz to make a determination of next steps.     Tiffanie Lopez MSN, RN  Rheumatology RN Care Coordinator  [facility]

## 2020-01-08 NOTE — TELEPHONE ENCOUNTER
Spoke with Sylvain, and he has provided the following information:    He did see community [facility] 2004; referral was made but Sylvain did not see any other rheumatologist.  No xrays / images were done     No xrays/ images were done by Podiatrist either.    Labs:  12/23/2019 -- RF 44  09/16/2004 -- RF <20    Sylvain reports taking Methotrexate 2.5mg (20mg) but for only one year 7289-1549 and was also taking Sulfasalazine 500mg (2tabs). [id number]    Sylvain reports that he quit taking both medications because he felt things were getting better but now he states the pain has worsened.   He is reporting pain in his feet only and finds it very difficult to stand on his feet for the long periods of time; he is an ER nurse.    Records are in the chart of the 2004 Rheumatology visit. 9/16/2004    Will route this information to Dr. Vaz for further determination.       Tiffanie Lopez MSN, RN  Rheumatology RN Care Coordinator  [facility]

## 2020-01-09 ENCOUNTER — TRANSFERRED RECORDS (OUTPATIENT)
Dept: HEALTH INFORMATION MANAGEMENT | Facility: CLINIC | Age: 50
End: 2020-01-09

## 2020-01-10 ENCOUNTER — DOCUMENTATION ONLY (OUTPATIENT)
Dept: RHEUMATOLOGY | Facility: CLINIC | Age: 50
End: 2020-01-10

## 2020-01-10 NOTE — PROGRESS NOTES
Action    Action Taken 01.10.2020 Called the pt at [phone] could not leave a message, called [phone] spoke to the pt and he states he saw Dr. Sherine Rashid back in 2004 but there was a Doctor's visit with Dr. Stock 09.16.2004. He have not seen anyone since.

## 2020-01-14 NOTE — TELEPHONE ENCOUNTER
Can patient come on 1-20 at 1000? Can he have anti-CCP, CBC, CRP, AST, ALT done beforehand please?

## 2020-01-15 DIAGNOSIS — R89.9 ABNORMAL LABORATORY TEST: ICD-10-CM

## 2020-01-15 LAB
BASOPHILS # BLD AUTO: 0 10E9/L (ref 0–0.2)
BASOPHILS NFR BLD AUTO: 0.6 %
DIFFERENTIAL METHOD BLD: NORMAL
EOSINOPHIL # BLD AUTO: 0.3 10E9/L (ref 0–0.7)
EOSINOPHIL NFR BLD AUTO: 3.5 %
ERYTHROCYTE [DISTWIDTH] IN BLOOD BY AUTOMATED COUNT: 14.2 % (ref 10–15)
HCT VFR BLD AUTO: 47.9 % (ref 40–53)
HGB BLD-MCNC: 15.5 G/DL (ref 13.3–17.7)
LYMPHOCYTES # BLD AUTO: 2.6 10E9/L (ref 0.8–5.3)
LYMPHOCYTES NFR BLD AUTO: 36.2 %
MCH RBC QN AUTO: 28.8 PG (ref 26.5–33)
MCHC RBC AUTO-ENTMCNC: 32.4 G/DL (ref 31.5–36.5)
MCV RBC AUTO: 89 FL (ref 78–100)
MONOCYTES # BLD AUTO: 0.8 10E9/L (ref 0–1.3)
MONOCYTES NFR BLD AUTO: 10.5 %
NEUTROPHILS # BLD AUTO: 3.6 10E9/L (ref 1.6–8.3)
NEUTROPHILS NFR BLD AUTO: 49.2 %
PLATELET # BLD AUTO: 309 10E9/L (ref 150–450)
RBC # BLD AUTO: 5.38 10E12/L (ref 4.4–5.9)
WBC # BLD AUTO: 7.2 10E9/L (ref 4–11)

## 2020-01-15 PROCEDURE — 84460 ALANINE AMINO (ALT) (SGPT): CPT | Performed by: INTERNAL MEDICINE

## 2020-01-15 PROCEDURE — 36415 COLL VENOUS BLD VENIPUNCTURE: CPT | Performed by: INTERNAL MEDICINE

## 2020-01-15 PROCEDURE — 84450 TRANSFERASE (AST) (SGOT): CPT | Performed by: INTERNAL MEDICINE

## 2020-01-15 PROCEDURE — 86140 C-REACTIVE PROTEIN: CPT | Performed by: INTERNAL MEDICINE

## 2020-01-15 PROCEDURE — 86200 CCP ANTIBODY: CPT | Performed by: INTERNAL MEDICINE

## 2020-01-15 PROCEDURE — 85025 COMPLETE CBC W/AUTO DIFF WBC: CPT | Performed by: INTERNAL MEDICINE

## 2020-01-15 NOTE — TELEPHONE ENCOUNTER
Spoke with Sylvain and he is able to come at the below offered date/time as per Dr. Vaz.  Sylvain states that he will have labs completed prior to this appointment.     Can patient come on 1-20 at 1000? Can he have anti-CCP, CBC, CRP, AST, ALT done beforehand please?    Tiffanie Lopez MSN, RN  Rheumatology RN Care Coordinator  [facility]

## 2020-01-16 LAB
ALT SERPL W P-5'-P-CCNC: 37 U/L (ref 0–70)
AST SERPL W P-5'-P-CCNC: 15 U/L (ref 0–45)
CRP SERPL-MCNC: 4.8 MG/L (ref 0–8)

## 2020-01-17 ENCOUNTER — TELEPHONE (OUTPATIENT)
Dept: RHEUMATOLOGY | Facility: CLINIC | Age: 50
End: 2020-01-17

## 2020-01-17 LAB — CCP AB SER IA-ACNC: 177 U/ML

## 2020-01-19 NOTE — PROGRESS NOTES
Rheumatology Clinic Visit 1     Gonzales Melo MRN# [id number]   YOB: 1970 Age: 49 year old     Date of Visit: 1-20-20  Primary care provider: Kandy Duff          Assessment and Plan:   Recurrent polyarthralgia in a gentleman with a distant history of rheumatoid arthritis.  Exam shows low-grade joint tenderness in rheumatoid arthritis target joints, including MTPs.  Rheumatoid factor was elevated at 44, and cyclic citrullinated peptide antibodies elevated at 177 in 2019.  CRP, CBC, LFTs, creatinine are all normal as of January 15, 2020.  Left ankle film in 2017 showed mild to moderate degenerative changes in the tibiotalar joint.    The picture is compatible with recurrent rheumatoid arthritis, seropositive.  Several historical features are atypical, including the history of worsening intensity joint symptoms associated with prolonged activity, and the absence of upper extremity small joint symptoms.  Moreover, the absence of constitutional symptoms, with a normal C-reactive protein, and the limited reported morning stiffness duration argue against severe disabling flare of inflammatory disease.  Nevertheless, the presence of symmetric polyarthritis affecting MTPs associated with the serologic profile argues for modest recurrent inflammatory activity.  We had a good discussion about the prognostic import of rheumatoid factor and anti-cyclic citrullinated peptide antibodies.  Persons with positive results for these tests carry an increased risk of progression to erosive, damaging, and disabling inflammatory arthritis.  For this reason, and because the patient has experienced persistent  troubling joint pain for the better part of half a year, I recommend immunotherapy.    We discussed the following plan:  - Start methotrexate 15 mg p.o. weekly.  Methotrexate may be escalated to a dose of 25 to 27.5 mg/week as needed to suppress joint pain symptoms.  - While on methotrexate, undergo every 3-month  CBC, LFTs, creatinine.  - Use folic acid 1 mg daily to prevent mucosal irritation associated with methotrexate use  - Use ibuprofen 400 to 800 mg up to 3 times daily as needed for joint pain.  -Expect a delay of 4-8 weeks for full onset of methotrexate action.  Escalating joint pain during this waiting period could be treated with short courses of low-dose prednisone if patient needs it.    I recommend follow-up in 3 months time.          Active Problem List:     Patient Active Problem List    Diagnosis Date Noted     Pes planus of both feet 12/23/2019     Priority: Medium     Morbid obesity (H) 11/30/2018     Priority: Medium     Obesity 01/30/2014     Priority: Medium     Hypertriglyceridemia 01/30/2014     Priority: Medium     Plantar fasciitis 01/30/2014     Priority: Medium     CARDIOVASCULAR SCREENING; LDL GOAL LESS THAN 160 10/31/2010     Priority: Medium     Asthma, mild intermittent      Priority: Medium            History of Present Illness:   Gonzales Melo is a 49 year old male who presents for evaluation of joint pain.    Patient was in his usual health until early in the year 2000.  He developed gradual onset of bilateral hand pain and finger swelling.  He was reportedly evaluated in rheumatology and diagnosed with rheumatoid arthritis.  He was treated with methotrexate and sulfasalazine.  Joint pain spontaneously resolved over months.  Patient stopped treatment after 1 year due to resolution of symptoms.  He was able to tolerate the medications well at the time.    He remained in excellent health until approximately 6 months ago, in the middle of 2019.  At that time, he developed gradual onset of bilateral foot, ankle, and knee pain.  Hands were not affected.  Pain in these joints was noted to be worse after a period of immobility.  There was no visible swelling in the affected joints, however there was intermittent soreness at the left first and second toes.  Ibuprofen was variably effective.   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  Morning stiffness has been less than 30 minutes throughout the last 6 months, but the frequency and severity of joint pain has gradually increased.    In November 2019, he experienced a back injury while working with the patient.  He has not been at work for the past 5 weeks due to that injury and is gradually recovering.  He has no sciatic symptoms bowel or bladder control issues, or balance problems.  Energy level has been good, and appetite has under has been unchanged.       Review of Systems:     Constitutional: negative  Skin: negative  Eyes: negative  Ears/Nose/Throat: negative  Respiratory: No shortness of breath, dyspnea on exertion, cough, or hemoptysis  Cardiovascular: negative  Gastrointestinal: negative  Genitourinary: negative  Musculoskeletal: hpi  Neurologic: negative  Psychiatric: negative  Hematologic/Lymphatic/Immunologic: negative  Endocrine: negative          Past Medical History:     Past Medical History:   Diagnosis Date     Asthma, mild intermittent      Obesity 1/30/2014     Plantar fasciitis 1/30/2014     Past Surgical History:   Procedure Laterality Date     COLONOSCOPY  9/30/2015    Dr. Son [facility]     COLONOSCOPY N/A 9/30/2015    Procedure: COLONOSCOPY;  Surgeon: Marvin Son MD;  Location:  GI     --recurrent asthma since the patient was in his mid 20s.  He has been treated with intermittent prednisone bursts 2-3 times per year.  He has never been intubated or hospitalized for asthma.  -Pyelonephritis as a teenager  - Sciatica with a right sided lumbar herniated disc.       Social History:     Social History     Occupational History     Not on file   Tobacco Use     Smoking status: Never Smoker     Smokeless tobacco: Never Used   Substance and Sexual Activity     Alcohol use: No     Drug use: No     Sexual activity: Yes      "Partners: Female   Patient works as a emergency room nurse.  He does not smoke.  He drinks no alcohol.  He has followed a \"keto\" diet for the last 6 months, and reports losing 40 pounds during that period.  He notes that increased amounts of chicken consumption is associated with increased severity of joint pain.  He denies frequent consumption of crustaceans or organ meats, and does not use aspirin or significant amounts of caffeine.         Family History:     Family History   Problem Relation Age of Onset     Family History Negative Mother      Hypertension Maternal Grandmother      Rheumatoid Arthritis Maternal Grandmother      Heart Disease Brother      Maternal grandmother had rheumatoid arthritis and cerebrovascular accident.  Maternal grandfather had coronary artery disease with an MI at age 55.       Allergies:     Allergies   Allergen Reactions     Mucinex Other (See Comments) and Hives     lip swelling            Medications:     Current Outpatient Medications   Medication Sig Dispense Refill     albuterol (PROAIR HFA/PROVENTIL HFA/VENTOLIN HFA) 108 (90 Base) MCG/ACT inhaler Inhale 1-2 puffs into the lungs every 4 hours as needed for shortness of breath / dyspnea or wheezing 18 g 1     fluticasone (FLONASE) 50 MCG/ACT spray Spray 1-2 sprays into both nostrils daily 1 Bottle 11     ipratropium - albuterol 0.5 mg/2.5 mg/3 mL (DUONEB) 0.5-2.5 (3) MG/3ML neb solution Take 1 vial (3 mLs) by nebulization every 6 hours as needed for shortness of breath / dyspnea 30 vial 1     methotrexate 2.5 MG tablet Take 6 tablets (15 mg) by mouth every 7 days Labs every 8 - 12 weeks for refills. 24 tablet 3            Physical Exam:   Blood pressure 117/77, pulse 91, temperature 98.3  F (36.8  C), temperature source Oral, height 1.854 m (6' 1\"), weight 144.4 kg (318 lb 6.4 oz).  Wt Readings from Last 4 Encounters:   01/20/20 144.4 kg (318 lb 6.4 oz)   12/31/19 140.6 kg (310 lb)   12/23/19 140.6 kg (310 lb)   11/08/19 140.6 " kg (310 lb)       Constitutional: well-developed, appearing stated age; cooperative  Eyes: nl EOM, PERRLA, vision, conjunctiva, sclera  ENT: nl external ears, nose, hearing, lips, teeth, gums, throat  No mucous membrane lesions, normal saliva pool  Neck: no mass or thyroid enlargement  Resp: lungs clear to auscultation, nl to palpation  CV: RRR, no murmurs, rubs or gallops, no edema  GI: no ABD mass or tenderness, no HSM  : not tested  Lymph: no cervical, supraclavicular, inguinal or epitrochlear nodes  MS: The TMJ, neck, shoulder, elbow, wrist, MCP/PIP/DIP, spine, hip, knee, ankle, and foot MTP/IP joints were examined.  He does have tenderness at multiple MTPs on both sides; there is tenderness without gross synovitis at ankles and at medial joint lines in the knees.  Hands fingers and wrists are unremarkable.  Full joint ROM. Normal  strength. No dactylitis,  tenosynovitis, enthesopathy.  Skin: no nail pitting, alopecia, rash, nodules or lesions  Neuro: nl cranial nerves, strength, sensation, DTRs.   Psych: nl judgement, orientation, memory, affect.         Data:     No results found for any visits on 01/20/20.    RHEUM RESULTS Latest Ref Rng & Units 7/13/2012 12/23/2019 1/15/2020   SED RATE 0 - 15 mm/h - 9 -   CRP, INFLAMMATION 0.0 - 8.0 mg/L - - 4.8   RHEUMATOID FACTOR <12 IU/mL - 44(H) -   LONA SCREEN BY EIA - - - -   AST 0 - 45 U/L - 12 15   ALT 0 - 70 U/L - 28 37   ALBUMIN 3.4 - 5.0 g/dL - 3.2(L) -   WBC 4.0 - 11.0 10e9/L 8.2 - 7.2   RBC 4.4 - 5.9 10e12/L 5.05 - 5.38   HGB 13.3 - 17.7 g/dL 14.9 - 15.5   HCT 40.0 - 53.0 % 44.9 - 47.9   MCV 78 - 100 fl 89 - 89   MCHC 31.5 - 36.5 g/dL 33.2 - 32.4   RDW 10.0 - 15.0 % 13.7 - 14.2    - 450 10e9/L 288 - 309   CREATININE 0.66 - 1.25 mg/dL - 0.78 -   GFR ESTIMATE, IF BLACK >60 mL/min/[1.73:m2] - >90 -   GFR ESTIMATE >60 mL/min/[1.73:m2] - >90 -       Rheumatoid Factor   Date Value Ref Range Status   12/23/2019 44 (H) <12 IU/mL Final   ,   Cyclic  Citrullinated Peptide Antibody, IgG   Date Value Ref Range Status   01/15/2020 177 (H) <7 U/mL Final     Comment:     Positive     LONA Screen by EIA   Date Value Ref Range Status   09/16/2004 <1.0  Interpretation:  Negative  Final     RHEUM RESULTS Latest Ref Rng & Units 7/13/2012 12/23/2019 1/15/2020   SED RATE 0 - 15 mm/h - 9 -   CRP, INFLAMMATION 0.0 - 8.0 mg/L - - 4.8   RHEUMATOID FACTOR <12 IU/mL - 44(H) -   LONA SCREEN BY EIA - - - -   AST 0 - 45 U/L - 12 15   ALT 0 - 70 U/L - 28 37   ALBUMIN 3.4 - 5.0 g/dL - 3.2(L) -   WBC 4.0 - 11.0 10e9/L 8.2 - 7.2   RBC 4.4 - 5.9 10e12/L 5.05 - 5.38   HGB 13.3 - 17.7 g/dL 14.9 - 15.5   HCT 40.0 - 53.0 % 44.9 - 47.9   MCV 78 - 100 fl 89 - 89   MCHC 31.5 - 36.5 g/dL 33.2 - 32.4   RDW 10.0 - 15.0 % 13.7 - 14.2    - 450 10e9/L 288 - 309   CREATININE 0.66 - 1.25 mg/dL - 0.78 -   GFR ESTIMATE, IF BLACK >60 mL/min/[1.73:m2] - >90 -   GFR ESTIMATE >60 mL/min/[1.73:m2] - >90 -       Rheumatoid Factor   Date Value Ref Range Status   12/23/2019 44 (H) <12 IU/mL Final   ,   Cyclic Citrullinated Peptide Antibody, IgG   Date Value Ref Range Status   01/15/2020 177 (H) <7 U/mL Final     Comment:     Positive   ,  ,  ,  ,  ,  ,   LONA Screen by EIA   Date Value Ref Range Status   09/16/2004 <1.0  Interpretation:  Negative  Final     Reviewed Rheumatology lab flowsheet

## 2020-01-20 ENCOUNTER — OFFICE VISIT (OUTPATIENT)
Dept: RHEUMATOLOGY | Facility: CLINIC | Age: 50
End: 2020-01-20
Attending: INTERNAL MEDICINE
Payer: COMMERCIAL

## 2020-01-20 VITALS
DIASTOLIC BLOOD PRESSURE: 77 MMHG | BODY MASS INDEX: 41.75 KG/M2 | WEIGHT: 315 LBS | TEMPERATURE: 98.3 F | SYSTOLIC BLOOD PRESSURE: 117 MMHG | HEART RATE: 91 BPM | HEIGHT: 73 IN

## 2020-01-20 DIAGNOSIS — R76.8 ELEVATED RHEUMATOID FACTOR: ICD-10-CM

## 2020-01-20 DIAGNOSIS — R76.8 RHEUMATOID FACTOR POSITIVE WITH CYCLIC CITRULLINATED PEPTIDE (CCP) ANTIBODY NEGATIVE: Primary | ICD-10-CM

## 2020-01-20 PROCEDURE — G0463 HOSPITAL OUTPT CLINIC VISIT: HCPCS | Mod: ZF

## 2020-01-20 ASSESSMENT — PAIN SCALES - GENERAL: PAINLEVEL: MILD PAIN (2)

## 2020-01-20 ASSESSMENT — MIFFLIN-ST. JEOR: SCORE: 2363.13

## 2020-01-20 NOTE — NURSING NOTE
"/77   Pulse 91   Temp 98.3  F (36.8  C) (Oral)   Ht 1.854 m (6' 1\")   Wt 144.4 kg (318 lb 6.4 oz)   BMI 42.01 kg/m    Chief Complaint   Patient presents with     Consult     consult with abnormal labs, tzimmer cma       "

## 2020-01-20 NOTE — LETTER
1/20/2020     RE: Gonzales Melo  [address]  [facility] [address]     Dear Colleague,    Thank you for referring your patient, Gonzales Melo, to the [facility] RHEUMATOLOGY at [facility]. Please see a copy of my visit note below.    Rheumatology Clinic Visit 1     Gonzales Melo MRN# [id number]   YOB: 1970 Age: 49 year old     Date of Visit: 1-20-20  Primary care provider: Kandy Duff          Assessment and Plan:   Recurrent polyarthralgia in a gentleman with a distant history of rheumatoid arthritis.  Exam shows low-grade joint tenderness in rheumatoid arthritis target joints, including MTPs.  Rheumatoid factor was elevated at 44, and cyclic citrullinated peptide antibodies elevated at 177 in 2019.  CRP, CBC, LFTs, creatinine are all normal as of January 15, 2020.  Left ankle film in 2017 showed mild to moderate degenerative changes in the tibiotalar joint.    The picture is compatible with recurrent rheumatoid arthritis, seropositive.  Several historical features are atypical, including the history of worsening intensity joint symptoms associated with prolonged activity, and the absence of upper extremity small joint symptoms.  Moreover, the absence of constitutional symptoms, with a normal C-reactive protein, and the limited reported morning stiffness duration argue against severe disabling flare of inflammatory disease.  Nevertheless, the presence of symmetric polyarthritis affecting MTPs associated with the serologic profile argues for modest recurrent inflammatory activity.  We had a good discussion about the prognostic import of rheumatoid factor and anti-cyclic citrullinated peptide antibodies.  Persons with positive results for these tests carry an increased risk of progression to erosive, damaging, and disabling inflammatory arthritis.  For this reason, and because the patient has experienced persistent  troubling joint pain for the  better part of half a year, I recommend immunotherapy.    We discussed the following plan:  - Start methotrexate 15 mg p.o. weekly.  Methotrexate may be escalated to a dose of 25 to 27.5 mg/week as needed to suppress joint pain symptoms.  - While on methotrexate, undergo every 3-month CBC, LFTs, creatinine.  - Use folic acid 1 mg daily to prevent mucosal irritation associated with methotrexate use  - Use ibuprofen 400 to 800 mg up to 3 times daily as needed for joint pain.  -Expect a delay of 4-8 weeks for full onset of methotrexate action.  Escalating joint pain during this waiting period could be treated with short courses of low-dose prednisone if patient needs it.    I recommend follow-up in 3 months time.          Active Problem List:     Patient Active Problem List    Diagnosis Date Noted     Pes planus of both feet 12/23/2019     Priority: Medium     Morbid obesity (H) 11/30/2018     Priority: Medium     Obesity 01/30/2014     Priority: Medium     Hypertriglyceridemia 01/30/2014     Priority: Medium     Plantar fasciitis 01/30/2014     Priority: Medium     CARDIOVASCULAR SCREENING; LDL GOAL LESS THAN 160 10/31/2010     Priority: Medium     Asthma, mild intermittent      Priority: Medium            History of Present Illness:   Gonzales Melo is a 49 year old male who presents for evaluation of joint pain.    Patient was in his usual health until early in the year 2000.  He developed gradual onset of bilateral hand pain and finger swelling.  He was reportedly evaluated in rheumatology and diagnosed with rheumatoid arthritis.  He was treated with methotrexate and sulfasalazine.  Joint pain spontaneously resolved over months.  Patient stopped treatment after 1 year due to resolution of symptoms.  He was able to tolerate the medications well at the time.    He remained in excellent health until approximately 6 months ago, in the middle of 2019.  At that time, he developed gradual onset of bilateral foot,  ankle, and knee pain.  Hands were not affected.  Pain in these joints was noted to be worse after a period of immobility.  There was no visible swelling in the affected joints, however there was intermittent soreness at the left first and second toes.  Ibuprofen was variably effective.  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  Morning stiffness has been less than 30 minutes throughout the last 6 months, but the frequency and severity of joint pain has gradually increased.    In November 2019, he experienced a back injury while working with the patient.  He has not been at work for the past 5 weeks due to that injury and is gradually recovering.  He has no sciatic symptoms bowel or bladder control issues, or balance problems.  Energy level has been good, and appetite has under has been unchanged.       Review of Systems:     Constitutional: negative  Skin: negative  Eyes: negative  Ears/Nose/Throat: negative  Respiratory: No shortness of breath, dyspnea on exertion, cough, or hemoptysis  Cardiovascular: negative  Gastrointestinal: negative  Genitourinary: negative  Musculoskeletal: hpi  Neurologic: negative  Psychiatric: negative  Hematologic/Lymphatic/Immunologic: negative  Endocrine: negative          Past Medical History:     Past Medical History:   Diagnosis Date     Asthma, mild intermittent      Obesity 1/30/2014     Plantar fasciitis 1/30/2014     Past Surgical History:   Procedure Laterality Date     [facility]  [facility]     COLONOSCOPY N/A 9/30/2015    Procedure: COLONOSCOPY;  Surgeon: Marvin Son MD;  Location:  GI     --recurrent asthma since the patient was in his mid 20s.  He has been treated with intermittent prednisone bursts 2-3 times per year.  He has never been intubated or hospitalized for asthma.  -Pyelonephritis as a teenager  - Sciatica with a right  "sided lumbar herniated disc.       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  He drinks no alcohol.  He has followed a \"keto\" diet for the last 6 months, and reports losing 40 pounds during that period.  He notes that increased amounts of chicken consumption is associated with increased severity of joint pain.  He denies frequent consumption of crustaceans or organ meats, and does not use aspirin or significant amounts of caffeine.         Family History:     Family History   Problem Relation Age of Onset     Family History Negative Mother      Hypertension Maternal Grandmother      Rheumatoid Arthritis Maternal Grandmother      Heart Disease Brother      Maternal grandmother had rheumatoid arthritis and cerebrovascular accident.  Maternal grandfather had coronary artery disease with an MI at age 55.       Allergies:     Allergies   Allergen Reactions     Mucinex Other (See Comments) and Hives     lip swelling            Medications:     Current Outpatient Medications   Medication Sig Dispense Refill     albuterol (PROAIR HFA/PROVENTIL HFA/VENTOLIN HFA) 108 (90 Base) MCG/ACT inhaler Inhale 1-2 puffs into the lungs every 4 hours as needed for shortness of breath / dyspnea or wheezing 18 g 1     fluticasone (FLONASE) 50 MCG/ACT spray Spray 1-2 sprays into both nostrils daily 1 Bottle 11     ipratropium - albuterol 0.5 mg/2.5 mg/3 mL (DUONEB) 0.5-2.5 (3) MG/3ML neb solution Take 1 vial (3 mLs) by nebulization every 6 hours as needed for shortness of breath / dyspnea 30 vial 1     methotrexate 2.5 MG tablet Take 6 tablets (15 mg) by mouth every 7 days Labs every 8 - 12 weeks for refills. 24 tablet 3            Physical Exam:   Blood " "pressure 117/77, pulse 91, temperature 98.3  F (36.8  C), temperature source Oral, height 1.854 m (6' 1\"), weight 144.4 kg (318 lb 6.4 oz).  Wt Readings from Last 4 Encounters:   01/20/20 144.4 kg (318 lb 6.4 oz)   12/31/19 140.6 kg (310 lb)   12/23/19 140.6 kg (310 lb)   11/08/19 140.6 kg (310 lb)       Constitutional: well-developed, appearing stated age; cooperative  Eyes: nl EOM, PERRLA, vision, conjunctiva, sclera  ENT: nl external ears, nose, hearing, lips, teeth, gums, throat  No mucous membrane lesions, normal saliva pool  Neck: no mass or thyroid enlargement  Resp: lungs clear to auscultation, nl to palpation  CV: RRR, no murmurs, rubs or gallops, no edema  GI: no ABD mass or tenderness, no HSM  : not tested  Lymph: no cervical, supraclavicular, inguinal or epitrochlear nodes  MS: The TMJ, neck, shoulder, elbow, wrist, MCP/PIP/DIP, spine, hip, knee, ankle, and foot MTP/IP joints were examined.  He does have tenderness at multiple MTPs on both sides; there is tenderness without gross synovitis at ankles and at medial joint lines in the knees.  Hands fingers and wrists are unremarkable.  Full joint ROM. Normal  strength. No dactylitis,  tenosynovitis, enthesopathy.  Skin: no nail pitting, alopecia, rash, nodules or lesions  Neuro: nl cranial nerves, strength, sensation, DTRs.   Psych: nl judgement, orientation, memory, affect.         Data:     No results found for any visits on 01/20/20.    RHEUM RESULTS Latest Ref Rng & Units 7/13/2012 12/23/2019 1/15/2020   SED RATE 0 - 15 mm/h - 9 -   CRP, INFLAMMATION 0.0 - 8.0 mg/L - - 4.8   RHEUMATOID FACTOR <12 IU/mL - 44(H) -   LONA SCREEN BY EIA - - - -   AST 0 - 45 U/L - 12 15   ALT 0 - 70 U/L - 28 37   ALBUMIN 3.4 - 5.0 g/dL - 3.2(L) -   WBC 4.0 - 11.0 10e9/L 8.2 - 7.2   RBC 4.4 - 5.9 10e12/L 5.05 - 5.38   HGB 13.3 - 17.7 g/dL 14.9 - 15.5   HCT 40.0 - 53.0 % 44.9 - 47.9   MCV 78 - 100 fl 89 - 89   MCHC 31.5 - 36.5 g/dL 33.2 - 32.4   RDW 10.0 - 15.0 % 13.7 - " 14.2    - 450 10e9/L 288 - 309   CREATININE 0.66 - 1.25 mg/dL - 0.78 -   GFR ESTIMATE, IF BLACK >60 mL/min/[1.73:m2] - >90 -   GFR ESTIMATE >60 mL/min/[1.73:m2] - >90 -       Rheumatoid Factor   Date Value Ref Range Status   12/23/2019 44 (H) <12 IU/mL Final   ,   Cyclic Citrullinated Peptide Antibody, IgG   Date Value Ref Range Status   01/15/2020 177 (H) <7 U/mL Final     Comment:     Positive     LONA Screen by EIA   Date Value Ref Range Status   09/16/2004 <1.0  Interpretation:  Negative  Final     RHEUM RESULTS Latest Ref Rng & Units 7/13/2012 12/23/2019 1/15/2020   SED RATE 0 - 15 mm/h - 9 -   CRP, INFLAMMATION 0.0 - 8.0 mg/L - - 4.8   RHEUMATOID FACTOR <12 IU/mL - 44(H) -   LONA SCREEN BY EIA - - - -   AST 0 - 45 U/L - 12 15   ALT 0 - 70 U/L - 28 37   ALBUMIN 3.4 - 5.0 g/dL - 3.2(L) -   WBC 4.0 - 11.0 10e9/L 8.2 - 7.2   RBC 4.4 - 5.9 10e12/L 5.05 - 5.38   HGB 13.3 - 17.7 g/dL 14.9 - 15.5   HCT 40.0 - 53.0 % 44.9 - 47.9   MCV 78 - 100 fl 89 - 89   MCHC 31.5 - 36.5 g/dL 33.2 - 32.4   RDW 10.0 - 15.0 % 13.7 - 14.2    - 450 10e9/L 288 - 309   CREATININE 0.66 - 1.25 mg/dL - 0.78 -   GFR ESTIMATE, IF BLACK >60 mL/min/[1.73:m2] - >90 -   GFR ESTIMATE >60 mL/min/[1.73:m2] - >90 -       Rheumatoid Factor   Date Value Ref Range Status   12/23/2019 44 (H) <12 IU/mL Final   ,   Cyclic Citrullinated Peptide Antibody, IgG   Date Value Ref Range Status   01/15/2020 177 (H) <7 U/mL Final     Comment:     Positive   ,  ,  ,  ,  ,  ,   LONA Screen by EIA   Date Value Ref Range Status   09/16/2004 <1.0  Interpretation:  Negative  Final     Reviewed Rheumatology lab flowsheet    Again, thank you for allowing me to participate in the care of your patient.      Sincerely,    Moisés Vaz MD

## 2020-01-20 NOTE — PATIENT INSTRUCTIONS
Dx:  1. Seropositive rheumatoid arthritis, mild activity    Plan:  1. Start methotrexate 15 mg weekly.  2. While on methotrexate, check LFTs, CBC every 12-14 weeks  3. Use folate 1 mg (or 1 MVI) daily to prevent oral sores.  4. Expect improvement in joint pain in 4-8 weeks.  5. Ibuprofen 400-600 mg every 6-8 hours as needed for joint pain while waiting for methotrexate to work.

## 2020-04-07 ENCOUNTER — MYC REFILL (OUTPATIENT)
Dept: RHEUMATOLOGY | Facility: CLINIC | Age: 50
End: 2020-04-07

## 2020-04-07 ENCOUNTER — HOSPITAL ENCOUNTER (OUTPATIENT)
Dept: LAB | Facility: CLINIC | Age: 50
Discharge: HOME | End: 2020-04-07
Attending: INTERNAL MEDICINE | Admitting: INTERNAL MEDICINE
Payer: COMMERCIAL

## 2020-04-07 ENCOUNTER — MYC REFILL (OUTPATIENT)
Dept: FAMILY MEDICINE | Facility: CLINIC | Age: 50
End: 2020-04-07

## 2020-04-07 DIAGNOSIS — J45.21 MILD INTERMITTENT ASTHMA WITH EXACERBATION: ICD-10-CM

## 2020-04-07 DIAGNOSIS — R76.8 RHEUMATOID FACTOR POSITIVE WITH CYCLIC CITRULLINATED PEPTIDE (CCP) ANTIBODY NEGATIVE: ICD-10-CM

## 2020-04-07 LAB
ALT SERPL W P-5'-P-CCNC: 57 U/L (ref 0–70)
AST SERPL W P-5'-P-CCNC: 29 U/L (ref 0–45)
CREAT SERPL-MCNC: 0.84 MG/DL (ref 0.66–1.25)
ERYTHROCYTE [DISTWIDTH] IN BLOOD BY AUTOMATED COUNT: 15.1 % (ref 10–15)
GFR SERPL CREATININE-BSD FRML MDRD: >90 ML/MIN/{1.73_M2}
HCT VFR BLD AUTO: 44.6 % (ref 40–53)
HGB BLD-MCNC: 14.7 G/DL (ref 13.3–17.7)
MCH RBC QN AUTO: 30 PG (ref 26.5–33)
MCHC RBC AUTO-ENTMCNC: 33 G/DL (ref 31.5–36.5)
MCV RBC AUTO: 91 FL (ref 78–100)
PLATELET # BLD AUTO: 380 10E9/L (ref 150–450)
RBC # BLD AUTO: 4.9 10E12/L (ref 4.4–5.9)
WBC # BLD AUTO: 8.7 10E9/L (ref 4–11)

## 2020-04-07 PROCEDURE — 84460 ALANINE AMINO (ALT) (SGPT): CPT | Performed by: INTERNAL MEDICINE

## 2020-04-07 PROCEDURE — 84450 TRANSFERASE (AST) (SGOT): CPT | Performed by: INTERNAL MEDICINE

## 2020-04-07 PROCEDURE — 36415 COLL VENOUS BLD VENIPUNCTURE: CPT | Performed by: INTERNAL MEDICINE

## 2020-04-07 PROCEDURE — 85027 COMPLETE CBC AUTOMATED: CPT | Performed by: INTERNAL MEDICINE

## 2020-04-07 PROCEDURE — 82565 ASSAY OF CREATININE: CPT | Performed by: INTERNAL MEDICINE

## 2020-04-07 RX ORDER — IPRATROPIUM BROMIDE AND ALBUTEROL SULFATE 2.5; .5 MG/3ML; MG/3ML
3 SOLUTION RESPIRATORY (INHALATION) EVERY 6 HOURS PRN
Qty: 30 VIAL | Refills: 1 | Status: SHIPPED | OUTPATIENT
Start: 2020-04-07 | End: 2021-11-08

## 2020-04-07 RX ORDER — ALBUTEROL SULFATE 90 UG/1
1-2 AEROSOL, METERED RESPIRATORY (INHALATION) EVERY 4 HOURS PRN
Qty: 18 G | Refills: 1 | Status: SHIPPED | OUTPATIENT
Start: 2020-04-07 | End: 2021-11-08

## 2020-04-07 NOTE — TELEPHONE ENCOUNTER
Prescription approved per [facility] Refill Protocol.  Shadia Hoskins RN

## 2020-04-08 NOTE — TELEPHONE ENCOUNTER
methotrexate 2.5 MG tablet      Last Written Prescription Date:  1-  Last Fill Quantity: 24,   # refills: 3  Last Office Visit: 1-  Future Office visit:  4-    CBC RESULTS:   Recent Labs   Lab Test 04/07/20 0245   WBC 8.7   RBC 4.90   HGB 14.7   HCT 44.6   MCV 91   MCH 30.0   MCHC 33.0   RDW 15.1*          Creatinine   Date Value Ref Range Status   04/07/2020 0.84 0.66 - 1.25 mg/dL Final   ]    Liver Function Studies -   Recent Labs   Lab Test 04/07/20 0245 12/23/19  0850   PROTTOTAL  --   --  7.3   ALBUMIN  --   --  3.2*   BILITOTAL  --   --  0.2   ALKPHOS  --   --  98   AST 29   < > 12   ALT 57   < > 28    < > = values in this interval not displayed.       Kathleen M Doege RN

## 2020-04-27 NOTE — PROGRESS NOTES
Rheumatology Clinic followup-telephone     Gonzales Melo MRN# [id number]   YOB: 1970 Age: 49 year old     Date of Visit: 4-28-20  Primary care provider: Kandy Duff          Assessment and Plan:   # Seropositive rheumatoid arthritis (RF, ACPA+ in 2019):   Patient relates persistent bilateral foot and ankle pain, unresponsive to use of podiatry prescribed shoe inserts and low-dose methotrexate. Lab work from April 7 showed creatinine and transaminases normal; CRP on January 15 was 4.8.    The association between foot pain and weightbearing after inactivity, such as walking after sitting during his shift at work, is striking.  The contribution of inadequately controlled inflammatory arthritis to this pain is not completely clear.  It is possible that the current low dose of methotrexate is simply insufficient.  Alternatively, degenerative changes or ligamentous injury could also be contributing.  I think that additional imaging is in order.  We discussed the following plan:    Plan:  1.  X-rays of the feet to rule out erosions  2.  Trial of prednisone 20 mg daily for 7 days, then 15 mg daily for 7 days, then off.  3.  Give our office a progress report in 10 to 14 days after starting prednisone.  4.  Increase methotrexate to 8 tablets (20 mg) once weekly.  While on the drug, take in no more than 2 alcoholic drinks weekly, and have blood work done every 12 weeks.  5.  Continue use of shoe inserts per Podiatry.    I recommend follow-up in 3 months time.          Active Problem List:     Patient Active Problem List    Diagnosis Date Noted     Pes planus of both feet 12/23/2019     Priority: Medium     Morbid obesity (H) 11/30/2018     Priority: Medium     Obesity 01/30/2014     Priority: Medium     Hypertriglyceridemia 01/30/2014     Priority: Medium     Plantar fasciitis 01/30/2014     Priority: Medium     CARDIOVASCULAR SCREENING; LDL GOAL LESS THAN 160 10/31/2010     Priority: Medium     Asthma, mild  "intermittent      Priority: Medium            History of Present Illness:   Gonzales Melo is a 49 year old male who presents for follow up of rheumatoid arthritis.  I last evaluated the patient in January 2020, when he reported the chronic, subacute onset of symmetric polyarthritis.  I recommended starting methotrexate 15 mg once weekly at that time.    Interval history, 4-28-20:    He reports doing \"ok\". He has not noted any improvement (< 5%) in his joints since starting methotrexate in 1-2020. He take 6 tabs once weekly, no difficulty. He still notes significant pain, after periods of immobility, in feet and ankles initially. He is able to walk off the pain by moving around, but it recurs as soon as he sits for awhile. Pain is on the soles and the sides of his feet; it also goes across the tops of the feet and in the base of his toes. No swelling. Mornings, he is stiff for ~ 10 minutes. 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 He is not using NSAIDs or OTC tylenol.    He recently went for a 3 mile walk; he had a tough time finishing the walk due to foot/ankle pain.     His knees give him pain, but more modest than in the feet. He attributes this pain to \"wear and tear\". UE joints are ok apart from stiffness in the fingers.    He has been to Podiatry, and has been given shoe inserts; he has had no help from the inserts, either.    History, January 2020  patient was in his usual health until early in the year 2000.  He developed gradual onset of bilateral hand pain and finger swelling.  He was reportedly evaluated in rheumatology and diagnosed with rheumatoid arthritis.  He was treated with methotrexate and sulfasalazine.  Joint pain spontaneously resolved over months.  Patient stopped treatment after 1 year due to resolution of symptoms.  He was able to tolerate the medications well at the time.    He remained in excellent health until approximately 6 months ago, in the middle of 2019.  At " that time, he developed gradual onset of bilateral foot, ankle, and knee pain.  Hands were not affected.  Pain in these joints was noted to be worse after a period of immobility.  There was no visible swelling in the affected joints, however there was intermittent soreness at the left first and second toes.  Ibuprofen was variably effective.  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  Morning stiffness has been less than 30 minutes throughout the last 6 months, but the frequency and severity of joint pain has gradually increased.    In November 2019, he experienced a back injury while working with the patient.  He has not been at work for the past 5 weeks due to that injury and is gradually recovering.  He has no sciatic symptoms bowel or bladder control issues, or balance problems.  Energy level has been good, and appetite has under has been unchanged.       Review of Systems:     Constitutional: negative  Skin: negative  Eyes: negative  Ears/Nose/Throat: negative  Respiratory: No shortness of breath, dyspnea on exertion, cough, or hemoptysis  Cardiovascular: negative  Gastrointestinal: negative  Genitourinary: negative  Musculoskeletal: hpi  Neurologic: negative  Psychiatric: negative  Hematologic/Lymphatic/Immunologic: negative  Endocrine: negative          Past Medical History:     Past Medical History:   Diagnosis Date     Asthma, mild intermittent      Obesity 1/30/2014     Plantar fasciitis 1/30/2014     Past Surgical History:   Procedure Laterality Date     COLONOSCOPY  9/30/2015    Dr. Son [facility]     COLONOSCOPY N/A 9/30/2015    Procedure: COLONOSCOPY;  Surgeon: Marvin Son MD;  Location:  GI     --recurrent asthma since the patient was in his mid 20s.  He has been treated with intermittent prednisone bursts 2-3 times per year.  He has never been intubated or hospitalized for  "asthma.  -Pyelonephritis as a teenager  - Sciatica with a right sided lumbar herniated disc.       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  He drinks no alcohol.  He has followed a \"keto\" diet for the last 6 months, and reports losing 40 pounds during that period.  He notes that increased amounts of chicken consumption is associated with increased severity of joint pain.  He denies frequent consumption of crustaceans or organ meats, and does not use aspirin or significant amounts of caffeine.         Family History:     Family History   Problem Relation Age of Onset     Family History Negative Mother      Hypertension Maternal Grandmother      Rheumatoid Arthritis Maternal Grandmother      Heart Disease Brother      Maternal grandmother had rheumatoid arthritis and cerebrovascular accident.  Maternal grandfather had coronary artery disease with an MI at age 55.       Allergies:     Allergies   Allergen Reactions     Mucinex Other (See Comments) and Hives     lip swelling            Medications:     Current Outpatient Medications   Medication Sig Dispense Refill     albuterol (PROAIR HFA/PROVENTIL HFA/VENTOLIN HFA) 108 (90 Base) MCG/ACT inhaler Inhale 1-2 puffs into the lungs every 4 hours as needed for shortness of breath / dyspnea or wheezing 18 g 1     fluticasone (FLONASE) 50 MCG/ACT spray Spray 1-2 sprays into both nostrils daily 1 Bottle 11     ipratropium - albuterol 0.5 mg/2.5 mg/3 mL (DUONEB) 0.5-2.5 (3) MG/3ML neb solution Take 1 vial (3 mLs) by nebulization every 6 hours as needed for shortness of breath / dyspnea 30 vial 1     methotrexate 2.5 MG tablet Take 6 tablets (15 mg) by mouth every 7 days Labs every 8 - 12 " weeks for refills. 72 tablet 0            Physical Exam:   There were no vitals taken for this visit.  Wt Readings from Last 4 Encounters:   01/20/20 144.4 kg (318 lb 6.4 oz)   12/31/19 140.6 kg (310 lb)   12/23/19 140.6 kg (310 lb)   11/08/19 140.6 kg (310 lb)     Psych: nl judgement, orientation, memory, affect.         Data:     No results found for any visits on 04/28/20.    RHEUM RESULTS Latest Ref Rng & Units 12/23/2019 1/15/2020 4/7/2020   SED RATE 0 - 15 mm/h 9 - -   CRP, INFLAMMATION 0.0 - 8.0 mg/L - 4.8 -   RHEUMATOID FACTOR <12 IU/mL 44(H) - -   LONA SCREEN BY EIA - - - -   AST 0 - 45 U/L 12 15 29   ALT 0 - 70 U/L 28 37 57   ALBUMIN 3.4 - 5.0 g/dL 3.2(L) - -   WBC 4.0 - 11.0 10e9/L - 7.2 8.7   RBC 4.4 - 5.9 10e12/L - 5.38 4.90   HGB 13.3 - 17.7 g/dL - 15.5 14.7   HCT 40.0 - 53.0 % - 47.9 44.6   MCV 78 - 100 fl - 89 91   MCHC 31.5 - 36.5 g/dL - 32.4 33.0   RDW 10.0 - 15.0 % - 14.2 15.1(H)    - 450 10e9/L - 309 380   CREATININE 0.66 - 1.25 mg/dL 0.78 - 0.84   GFR ESTIMATE, IF BLACK >60 mL/min/[1.73:m2] >90 - >90   GFR ESTIMATE >60 mL/min/[1.73:m2] >90 - >90       Rheumatoid Factor   Date Value Ref Range Status   12/23/2019 44 (H) <12 IU/mL Final   ,   Cyclic Citrullinated Peptide Antibody, IgG   Date Value Ref Range Status   01/15/2020 177 (H) <7 U/mL Final     Comment:     Positive     LONA Screen by EIA   Date Value Ref Range Status   09/16/2004 <1.0  Interpretation:  Negative  Final     RHEUM RESULTS Latest Ref Rng & Units 12/23/2019 1/15/2020 4/7/2020   SED RATE 0 - 15 mm/h 9 - -   CRP, INFLAMMATION 0.0 - 8.0 mg/L - 4.8 -   RHEUMATOID FACTOR <12 IU/mL 44(H) - -   LONA SCREEN BY EIA - - - -   AST 0 - 45 U/L 12 15 29   ALT 0 - 70 U/L 28 37 57   ALBUMIN 3.4 - 5.0 g/dL 3.2(L) - -   WBC 4.0 - 11.0 10e9/L - 7.2 8.7   RBC 4.4 - 5.9 10e12/L - 5.38 4.90   HGB 13.3 - 17.7 g/dL - 15.5 14.7   HCT 40.0 - 53.0 % - 47.9 44.6   MCV 78 - 100 fl - 89 91   MCHC 31.5 - 36.5 g/dL - 32.4 33.0   RDW 10.0 - 15.0 % - 14.2  15.1(H)    - 450 10e9/L - 309 380   CREATININE 0.66 - 1.25 mg/dL 0.78 - 0.84   GFR ESTIMATE, IF BLACK >60 mL/min/[1.73:m2] >90 - >90   GFR ESTIMATE >60 mL/min/[1.73:m2] >90 - >90       Rheumatoid Factor   Date Value Ref Range Status   12/23/2019 44 (H) <12 IU/mL Final   ,   Cyclic Citrullinated Peptide Antibody, IgG   Date Value Ref Range Status   01/15/2020 177 (H) <7 U/mL Final     Comment:     Positive   ,  ,  ,  ,  ,  ,   LONA Screen by EIA   Date Value Ref Range Status   09/16/2004 <1.0  Interpretation:  Negative  Final     Reviewed Rheumatology lab flowsheet

## 2020-04-28 ENCOUNTER — VIRTUAL VISIT (OUTPATIENT)
Dept: RHEUMATOLOGY | Facility: CLINIC | Age: 50
End: 2020-04-28
Attending: INTERNAL MEDICINE
Payer: COMMERCIAL

## 2020-04-28 DIAGNOSIS — R76.8 RHEUMATOID FACTOR POSITIVE WITH CYCLIC CITRULLINATED PEPTIDE (CCP) ANTIBODY NEGATIVE: ICD-10-CM

## 2020-04-28 RX ORDER — PREDNISONE 5 MG/1
TABLET ORAL
Qty: 50 TABLET | Refills: 1 | Status: SHIPPED | OUTPATIENT
Start: 2020-04-28 | End: 2020-05-21

## 2020-04-28 ASSESSMENT — PAIN SCALES - GENERAL: PAINLEVEL: EXTREME PAIN (8)

## 2020-04-28 NOTE — PATIENT INSTRUCTIONS
Diagnosis:  1.  Persistent foot and ankle pain, unresponsive to use of shoe inserts and low-dose methotrexate for 3 months.  2.  History of autoimmune arthritis and autoimmune serologies (rheumatoid factor and cyclic citrullinated peptide).    It is unclear what contribution inflammation is making to the current symptom complex.    Plan:  1.  X-rays of the feet to rule out erosions  2.  Trial of prednisone 20 mg daily for 7 days, then 15 mg daily for 7 days, then off.  3.  Give our office a progress report in 10 to 14 days after starting prednisone.  4.  Increase methotrexate to 8 tablets (20 mg) once weekly.  While on the drug, take in no more than 2 alcoholic drinks weekly, and have blood work done every 12 weeks.  5.  Continue use of shoe inserts per podiatry.

## 2020-04-28 NOTE — PROGRESS NOTES
"Gonzales Melo is a 49 year old male who is being evaluated via a billable telephone visit.      The patient has been notified of following:     \"This telephone visit will be conducted via a call between you and your physician/provider. We have found that certain health care needs can be provided without the need for a physical exam.  This service lets us provide the care you need with a short phone conversation.  If a prescription is necessary we can send it directly to your pharmacy.  If lab work is needed we can place an order for that and you can then stop by our lab to have the test done at a later time.    Telephone visits are billed at different rates depending on your insurance coverage. During this emergency period, for some insurers they may be billed the same as an in-person visit.  Please reach out to your insurance provider with any questions.    If during the course of the call the physician/provider feels a telephone visit is not appropriate, you will not be charged for this service.\"    Patient has given verbal consent for Telephone visit?  Yes    How would you like to obtain your AVS? MyChart    Phone call duration: 13 minutes    Moisés Vaz MD      "

## 2020-05-21 VITALS — HEIGHT: 74 IN | BODY MASS INDEX: 40.43 KG/M2 | WEIGHT: 315 LBS

## 2020-05-21 ASSESSMENT — MIFFLIN-ST. JEOR: SCORE: 2378.32

## 2020-05-21 NOTE — PROGRESS NOTES
"Gonzales Melo is a 49 year old male who is being evaluated via a billable telephone visit.      The patient has been notified of following:     \"This telephone visit will be conducted via a call between you and your physician/provider. We have found that certain health care needs can be provided without the need for a physical exam.  This service lets us provide the care you need with a short phone conversation.  If a prescription is necessary we can send it directly to your pharmacy.  If lab work is needed we can place an order for that and you can then stop by our lab to have the test done at a later time.    Telephone visits are billed at different rates depending on your insurance coverage. During this emergency period, for some insurers they may be billed the same as an in-person visit.  Please reach out to your insurance provider with any questions.    If during the course of the call the physician/provider feels a telephone visit is not appropriate, you will not be charged for this service.\"    Patient has given verbal consent for Telephone visit?  Yes    What phone number would you like to be contacted at? [phone]    How would you like to obtain your AVS? Madisyn Pereira MA on 5/21/2020 at 9:54 AM        [facility]   Outpatient Sleep Medicine Consultation  May 22, 2020    Name: Gonzales Melo MRN# [id number]   Age: 49 year old YOB: 1970     Date of Consultation: May 22, 2020  Consultation is requested by: No referring provider defined for this encounter.  Primary care provider: Kandy Duff  Gonzales Melo is a 49 year old male who is being evaluated via a billable telephone visit.      The patient has been notified of following:     \"This telephone visit will be conducted via a call between you and your physician/provider. We have found that certain health care needs can be provided without the need for a physical exam.  This service lets us " "provide the care you need with a short phone conversation.  If a prescription is necessary we can send it directly to your pharmacy.  If lab work is needed we can place an order for that and you can then stop by our lab to have the test done at a later time.    Telephone visits are billed at different rates depending on your insurance coverage. During this emergency period, for some insurers they may be billed the same as an in-person visit.  Please reach out to your insurance provider with any questions.    If during the course of the call the physician/provider feels a telephone visit is not appropriate, you will not be charged for this service.\"    Patient has given verbal consent for Telephone visit?  Yes    How would you like to obtain your AVS? Mail a copy    I have reviewed and updated the patient's Past Medical History, Social History, Family History and Medication List.    ALLERGIES  Mucinex               Assessment and Plan:       Severe obstructive sleep apnea with hypoxemia and hypoventilation currently effectively treated with CPAP with good adherence    Night shift work with social jet lag and chronically insufficient sleep currently not really symptomatic with increased risk      Comorbid Diagnoses:      Severe obesity BMI 42    Mild intermittent asthma    Summary Recommendations:      CPAP supplies ordered with return to clinic in 1 year    Summary Counseling:  Patient counseled on sleep optimization moving at some point to second shift work.           History of Present Illness:     Gonzales Melo is a 49 year old male with delayed sleep phase and severe obstructive sleep apnea with hypoventiltion with hypoxemia on effective CPAP with elimination of obstructive sleep apnea and effective use.  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  He does work straight night shift now with marked " "variations in sleep duration depending upon schedule with an average sleep duration of 6.3 hours based on CPAP use.  He has no difficulty with the interface or pressure tolerance and is pleased with the improvement in symptoms.  We did review health risk related to night shift work particularly in the group of nurses were data is best documented for increased risk of weight gain with associated metabolic syndrome as well as cancer risk particularly in females.  He is interested in reducing health risk related to insufficient sleep with better circadian alignment-planning to advance work schedule within 4 years.    PREVIOUS SLEEP STUDIES:     YOB: 1970   Study Date: 3/15/2019   MRN: [id number]   Referring Provider: BONNIE ARIAS MD   Ordering Provider: CYRUS FOY MD   Indications for Polysomnography: The patient is a 48 y old male who is 6' 2\" and weighs 343.0 lbs. His BMI is 44.0, [place] sleepiness scale 23.0 and neck circumference is 45.0 cm. Relevant medical history includes morbid obesity and mild asthma. A diagnostic polysomnogram was performed to evaluate for sleep apnea /hypoventilation.   After 144.0 minutes of sleep time the patient exhibited sufficient respiratory events qualifying him for a CPAP trial which was then initiated.   Polysomnogram Data: A full night polysomnogram recorded the standard physiologic parameters including EEG, EOG, EMG, ECG, nasal and oral airflow. Respiratory parameters of chest and abdominal movements were recorded with respiratory inductance plethysmography. Oxygen saturation was recorded by pulse oximetry. Hypopnea scoring rule used: 1B 4%   Diagnostic PSG   Sleep Architecture: Sleep fragmentation and poor efficiency.   The total recording time of the polysomnogram was 191.9 minutes. The total sleep time was 144.0 minutes. Sleep latency was normal at 1.9 minutes with the use of a sleep aid (5 mg zolpidem). REM latency was 176.5 minutes. Arousal index was " increased at 97.1 arousals per hour. Sleep efficiency was decreased at 75.0%. Wake after sleep onset was 37.5 minutes. The patient spent 14.6% of total sleep time in Stage N1, 79.2% in Stage N2, 2.8% in Stage N3, and 3.5% in REM. Time in REM supine was 0 minutes.   Respiration: Severe obstructive sleep apnea observed in lateral sleep; no supine sleep was recorded. Hypoventilation was observed with a 12 mmHg rise on TCM. ABG from the morning of 3/16/2019 pH 7.41, pCO2 46, pO2 86.     Events ? The polysomnogram revealed a presence of 57 obstructive, 2 central, and 154 mixed apneas resulting in an apnea index of 88.8 events per hour. There were 27 obstructive hypopneas and - central hypopneas resulting in an obstructive hypopnea index of 11.3 and central hypopnea index of - events per hour. The combined apnea/hypopnea index was 100.0 events per hour (central apnea/hypopnea index was 0.8 events per hour). The REM AHI was 84.0 events per hour. The supine AHI was - events per hour. The RERA index was 0.4 events per hour. The RDI was 100.4 events per hour.     Snoring - was reported as loud.     Respiratory rate and pattern - was notable for normal respiratory rate and pattern.     Sustained Sleep Associated Hypoventilation - Transcutaneous carbon dioxide monitoring was used with a maximum change from 37 to 49.2 mmHg and 0 minutes at or greater than 55 mmHg.     Sleep Associated Hypoxemia - (Greater than 5 minutes O2 sat at or below 88%) was present. Baseline oxygen saturation was 89.7%. Lowest oxygen saturation was 73.8%. Time spent less than or equal to 88% was 62.9 minutes. Time spent less than or equal to 89% was 72.2 minutes.     Treatment PSG   Sleep Architecture: REM rebound. Sleep fragmentation and efficiency improved with PAP therapy. At 02:13:02 AM the patient was placed on PAP treatment and was titrated at pressures ranging from CPAP 6 cmH2O up to CPAP 9 cmH2O. The total recording time of the treatment portion  of the study was 299.4 minutes. The total sleep time was 266.0 minutes. During the treatment portion of the study the sleep latency was 8.0 minutes. REM latency was 50.5 minutes. Arousal index was normal at 10.4 arousals per hour. Sleep efficiency was normal at 88.8%. Wake after sleep onset was 25.0 minutes. The patient spent 2.8% of total sleep time in Stage N1, 13.2% in Stage N2, 20.3% in Stage N3, and 63.7% in REM. Time in REM supine was - minutes. Respiration: Significant improvement in sleep apnea and hypoventilation with CPAP 9 cm H2O, although no supine sleep was observed.     The final pressure was CPAP 9 cmH2O with an AHI of 1.1 events per hour. Time in REM supine on final pressure was - minutes.   - This titration was considered Adequate (residual AHI with 75% decrease or above constraints without REM?supine sleep at final pressure).     Movement Activity: No abnormal motor activity.     Periodic Limb Movements   o During the diagnostic portion of the study, there were - PLMs recorded. The PLM index was - movements per hour. The PLM Arousal Index was - per hour.   o During the treatment portion of the study, there were - PLMs recorded. The PLM index was - movements per hour. The PLM Arousal Index was - per hour.     REM EMG Activity - Excessive transient/sustained muscle activity was not present.     Nocturnal Behavior - Abnormal sleep related behaviors were not noted during/arising out of NREM / REM sleep.     Bruxism - None apparent.     Cardiac Summary: Sinus tachycardia on baseline study that resolved on CPAP.   During the diagnostic portion of the study, the average pulse rate was 83.2 bpm. The minimum pulse rate was 54.0 bpm while the maximum pulse rate was 111.2 bpm.   During the treatment portion of the study, the average pulse rate was 82.7 bpm. The minimum pulse rate was 61.1 bpm while the maximum pulse rate was 107.0 bpm.   Arrhythmias were not noted.   Assessment:     Diagnostic phase:   o  Severe obstructive sleep apnea observed in lateral sleep; no supine sleep was recorded. Hypoventilation was observed with a 12 mmHg rise on TCM.   o Sleep fragmentation and poor efficiency.     Titration phase:   o Significant improvement in sleep apnea and hypoventilation with CPAP 9 cm H2O, although no supine sleep was observed.   o REM rebound. Sleep fragementation and efficiency improved with PAP therapy.     Awake obesity hypoventilation syndrome     No abnormal motor activity.     Sinus tachycardia on baseline study that resolved on CPAP.     Recommendations:     Patient was contacted by phone and elected to start treatment of CHEPE with auto?titrating PAP therapy with a range of 7 cmH2O to 15 cmH2O. Recommend clinical follow up with sleep management team, including review of compliance measures.     If devices are not acceptable or effective, patient may benefit from evaluation of possible surgical options for the treatment of obstructive sleep apnea and/or socially disruptive snoring. If he is interested, would recommend referral to specialized ENT?Sleep provider.     Advice regarding the risks of drowsy driving.     Suggest optimizing sleep schedule and avoiding sleep deprivation.     Weight management (BMI = 44)       CURRENT THERAPY  Subjective:  This patient is very happy with resolution of his sleepiness and nonrestorative sleep as well as sleep disruption.  He also generally feels better.  He is invested in his health although appears to have occasional periods of sleepiness related to March shift work changes..          Objective:  CPAP Compliance Targets:   >70% days > 4 hours AHI < 5   30 days ending May 21, 2020   Auto-PAP 7.0 - 15.0 cmH2O 30 day usage data:    90% of days with > 4 hours of use. 0/30 days with no use.   Average use 383 minutes per day.   95%ile Leak 16.68 L/min.   CPAP 95% pressure 10.9 cm.   AHI 0.64 events per hour.      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          Medications:     Current Outpatient Medications   Medication Sig     albuterol (PROAIR HFA/PROVENTIL HFA/VENTOLIN HFA) 108 (90 Base) MCG/ACT inhaler Inhale 1-2 puffs into the lungs every 4 hours as needed for shortness of breath / dyspnea or wheezing     fluticasone (FLONASE) 50 MCG/ACT spray Spray 1-2 sprays into both nostrils daily     ipratropium - albuterol 0.5 mg/2.5 mg/3 mL (DUONEB) 0.5-2.5 (3) MG/3ML neb solution Take 1 vial (3 mLs) by nebulization every 6 hours as needed for shortness of breath / dyspnea     methotrexate 2.5 MG tablet Take 8 tablets (20 mg) by mouth every 7 days Labs every 8 - 12 weeks for refills.     No current facility-administered medications for this visit.         Allergies   Allergen Reactions     Mucinex Other (See Comments) and Hives     lip swelling            Past Medical History:     Does not need 02 supplement at night   Past Medical History:   Diagnosis Date     Asthma, mild intermittent      Obesity 1/30/2014     Plantar fasciitis 1/30/2014             Past Surgical History:    No h/o  upper airway surgery  Past Surgical History:   Procedure Laterality Date     COLONOSCOPY  9/30/2015    Dr. Son [facility]     COLONOSCOPY N/A 9/30/2015    Procedure: COLONOSCOPY;  Surgeon: Marvin Son MD;  Location:  GI                      Physical Examination:     Objective   Reported vitals:  There were no vitals taken for this visit.   healthy, alert and no distress  Psych: Alert and oriented times 3; coherent speech, normal   rate and volume, able to articulate logical thoughts, able   to abstract reason, no tangential thoughts, no hallucinations   or delusions  His affect is normal         Copy to: Kandy Duff MD 5/22/2020     [facility]   Floor 1, [address]   [address] [place] [address]   Appointments: [phone]    [facility]  3rd Floor  [address] [place]  [address]     Total time spent with patient: 25 min         Afib    Atrial fibrillation    Coronary stent restenosis, sequela    Diabetes    Hypertension    Hypertension    Stented coronary artery

## 2020-05-21 NOTE — PATIENT INSTRUCTIONS
Your BMI is Body mass index is 41.65 kg/m .  Weight management is a personal decision.  If you are interested in exploring weight loss strategies, the following discussion covers the approaches that may be successful. Body mass index (BMI) is one way to tell whether you are at a healthy weight, overweight, or obese. It measures your weight in relation to your height.  A BMI of 18.5 to 24.9 is in the healthy range. A person with a BMI of 25 to 29.9 is considered overweight, and someone with a BMI of 30 or greater is considered obese. More than two-thirds of American adults are considered overweight or obese.  Being overweight or obese increases the risk for further weight gain. Excess weight may lead to heart disease and diabetes.  Creating and following plans for healthy eating and physical activity may help you improve your health.  Weight control is part of healthy lifestyle and includes exercise, emotional health, and healthy eating habits. Careful eating habits lifelong are the mainstay of weight control. Though there are significant health benefits from weight loss, long-term weight loss with diet alone may be very difficult to achieve- studies show long-term success with dietary management in less than 10% of people. Attaining a healthy weight may be especially difficult to achieve in those with severe obesity. In some cases, medications, devices and surgical management might be considered.  What can you do?  If you are overweight or obese and are interested in methods for weight loss, you should discuss this with your provider.     Consider reducing daily calorie intake by 500 calories.     Keep a food journal.     Avoiding skipping meals, consider cutting portions instead.    Diet combined with exercise helps maintain muscle while optimizing fat loss. Strength training is particularly important for building and maintaining muscle mass. Exercise helps reduce stress, increase energy, and improves fitness.  Increasing exercise without diet control, however, may not burn enough calories to loose weight.       Start walking three days a week 10-20 minutes at a time    Work towards walking thirty minutes five days a week     Eventually, increase the speed of your walking for 1-2 minutes at time    In addition, we recommend that you review healthy lifestyles and methods for weight loss available through the National Institutes of Health patient information sites:  http://win.niddk.nih.gov/publications/index.htm    And look into health and wellness programs that may be available through your health insurance provider, employer, local community center, or jere club.    Weight management plan: Patient was referred to their PCP to discuss a diet and exercise plan.

## 2020-05-22 ENCOUNTER — VIRTUAL VISIT (OUTPATIENT)
Dept: SLEEP MEDICINE | Facility: CLINIC | Age: 50
End: 2020-05-22
Payer: COMMERCIAL

## 2020-05-22 DIAGNOSIS — G47.33 OSA (OBSTRUCTIVE SLEEP APNEA): Primary | ICD-10-CM

## 2020-05-22 PROCEDURE — 99213 OFFICE O/P EST LOW 20 MIN: CPT | Mod: TEL | Performed by: INTERNAL MEDICINE

## 2020-06-25 ENCOUNTER — HOSPITAL ENCOUNTER (OUTPATIENT)
Dept: LAB | Facility: CLINIC | Age: 50
Discharge: HOME | End: 2020-06-25
Admitting: INTERNAL MEDICINE
Payer: COMMERCIAL

## 2020-06-25 DIAGNOSIS — R76.8 RHEUMATOID FACTOR POSITIVE WITH CYCLIC CITRULLINATED PEPTIDE (CCP) ANTIBODY NEGATIVE: ICD-10-CM

## 2020-06-25 LAB
ALT SERPL W P-5'-P-CCNC: 55 U/L (ref 0–70)
AST SERPL W P-5'-P-CCNC: 32 U/L (ref 0–45)
CREAT SERPL-MCNC: 0.95 MG/DL (ref 0.66–1.25)
ERYTHROCYTE [DISTWIDTH] IN BLOOD BY AUTOMATED COUNT: 14.6 % (ref 10–15)
GFR SERPL CREATININE-BSD FRML MDRD: >90 ML/MIN/{1.73_M2}
HCT VFR BLD AUTO: 42.5 % (ref 40–53)
HGB BLD-MCNC: 14.4 G/DL (ref 13.3–17.7)
MCH RBC QN AUTO: 31.4 PG (ref 26.5–33)
MCHC RBC AUTO-ENTMCNC: 33.9 G/DL (ref 31.5–36.5)
MCV RBC AUTO: 93 FL (ref 78–100)
PLATELET # BLD AUTO: 298 10E9/L (ref 150–450)
RBC # BLD AUTO: 4.58 10E12/L (ref 4.4–5.9)
WBC # BLD AUTO: 8.1 10E9/L (ref 4–11)

## 2020-06-25 PROCEDURE — 82565 ASSAY OF CREATININE: CPT | Performed by: INTERNAL MEDICINE

## 2020-06-25 PROCEDURE — 85027 COMPLETE CBC AUTOMATED: CPT | Performed by: INTERNAL MEDICINE

## 2020-06-25 PROCEDURE — 84460 ALANINE AMINO (ALT) (SGPT): CPT | Performed by: INTERNAL MEDICINE

## 2020-06-25 PROCEDURE — 36415 COLL VENOUS BLD VENIPUNCTURE: CPT | Performed by: INTERNAL MEDICINE

## 2020-06-25 PROCEDURE — 84450 TRANSFERASE (AST) (SGOT): CPT | Performed by: INTERNAL MEDICINE

## 2020-07-09 ENCOUNTER — MYC MEDICAL ADVICE (OUTPATIENT)
Dept: RHEUMATOLOGY | Facility: CLINIC | Age: 50
End: 2020-07-09

## 2020-07-11 NOTE — TELEPHONE ENCOUNTER
Thank you for follow-up on the prednisone.  I am glad that the low-dose and short course of prednisone gave significant relief.  This suggests an inflammatory component to the ongoing joint pain and joint symptoms.  It is still possible that the current methotrexate dose is not sufficient.  I recommend increasing from 20 mg weekly to 25 mg weekly for the next several weeks; we will review response to higher dose at scheduled visit on July 31.

## 2020-07-13 ENCOUNTER — HOSPITAL ENCOUNTER (EMERGENCY)
Facility: CLINIC | Age: 50
Discharge: HOME | End: 2020-07-13
Attending: EMERGENCY MEDICINE | Admitting: EMERGENCY MEDICINE
Payer: OTHER MISCELLANEOUS

## 2020-07-13 VITALS
SYSTOLIC BLOOD PRESSURE: 115 MMHG | OXYGEN SATURATION: 95 % | DIASTOLIC BLOOD PRESSURE: 78 MMHG | RESPIRATION RATE: 16 BRPM | TEMPERATURE: 99.5 F | HEART RATE: 124 BPM
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
## 2020-07-13 DIAGNOSIS — M54.41 ACUTE RIGHT-SIDED LOW BACK PAIN WITH RIGHT-SIDED SCIATICA: ICD-10-CM

## 2020-07-13 PROCEDURE — 25000132 ZZH RX MED GY IP 250 OP 250 PS 637: Performed by: EMERGENCY MEDICINE

## 2020-07-13 PROCEDURE — 99283 EMERGENCY DEPT VISIT LOW MDM: CPT

## 2020-07-13 PROCEDURE — 25000131 ZZH RX MED GY IP 250 OP 636 PS 637: Performed by: EMERGENCY MEDICINE

## 2020-07-13 RX ORDER — ACETAMINOPHEN 500 MG
1000 TABLET ORAL ONCE
Status: COMPLETED | OUTPATIENT
Start: 2020-07-13 | End: 2020-07-13

## 2020-07-13 RX ORDER — PREDNISONE 20 MG/1
60 TABLET ORAL ONCE
Status: COMPLETED | OUTPATIENT
Start: 2020-07-13 | End: 2020-07-13

## 2020-07-13 RX ORDER — PREDNISONE 10 MG/1
TABLET ORAL
Qty: 23 TABLET | Refills: 0 | Status: SHIPPED | OUTPATIENT
Start: 2020-07-13 | End: 2020-07-19

## 2020-07-13 RX ORDER — PREDNISONE 20 MG/1
20 TABLET ORAL ONCE
Status: COMPLETED | OUTPATIENT
Start: 2020-07-13 | End: 2020-07-13

## 2020-07-13 RX ADMIN — PREDNISONE 40 MG: 20 TABLET ORAL at 03:08

## 2020-07-13 RX ADMIN — ACETAMINOPHEN 1000 MG: 500 TABLET, FILM COATED ORAL at 03:08

## 2020-07-13 RX ADMIN — PREDNISONE 20 MG: 20 TABLET ORAL at 03:16
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
## 2020-07-13 NOTE — ED AVS SNAPSHOT
Emergency Department  [address] [place] [address]  Phone:  [phone]  Fax:  [phone]                                    Gonzales Melo   MRN: [id number]    Department:   Emergency Department   Date of Visit:  7/13/2020           After Visit Summary Signature Page    I have received my discharge instructions, and my questions have been answered. I have discussed any challenges I see with this plan with the nurse or doctor.    ..........................................................................................................................................  Patient/Patient Representative Signature      ..........................................................................................................................................  Patient Representative Print Name and Relationship to Patient    ..................................................               ................................................  Date                                   Time    ..........................................................................................................................................  Reviewed by Signature/Title    ...................................................              ..............................................  Date                                               Time          22EPIC Rev 08/18

## 2020-07-13 NOTE — TELEPHONE ENCOUNTER
Left message for Sylvain to return my call regarding the below message from Dr. Vaz but also sent him mychart as well.    Thank you for follow-up on the prednisone.  I am glad that the low-dose and short course of prednisone gave significant relief.  This suggests an inflammatory component to the ongoing joint pain and joint symptoms.  It is still possible that the current methotrexate dose is not sufficient.  I recommend increasing from 20 mg weekly to 25 mg weekly for the next several weeks; we will review response to higher dose at scheduled visit on July 31.        Patient refers to increase dose of methotrexate and his MyChart message.  Please confirm current dose of methotrexate.  If the current dose is 20 mg weekly, I recommend that he increase it to 25 mg weekly.        Tiffanie Lopez MSN, RN  Rheumatology RN Care Coordinator  [facility]

## 2020-07-13 NOTE — LETTER
July 13, 2020      To Whom It May Concern:      Gonzales Melo was seen in our Emergency Department today, 07/13/20.  I expect his condition to improve over the next 7 - 10 days.  This occurred on the job.  He may require some reduction in activity or he may notice only mild discomfort -- time will tell.  He may return to work in whatever capacity in which he feels he can perform when improved.    Sincerely,        Chad A. Trierweiler, MD

## 2020-07-14 NOTE — TELEPHONE ENCOUNTER
Spoke to Sylvain and he just started his Methotrexate 25mg yesterday.  He said he will take this dosing until his appointment with Dr. Vaz on July 31st.     Tiffanie Lopez MSN, RN  Rheumatology RN Care Coordinator  [facility]

## 2020-07-30 NOTE — PROGRESS NOTES
Rheumatology Clinic Visit-telephone  Moisés Vaz M.D.     Gonzales Melo MRN# [id number]   YOB: 1970 Age: 49 year old     Date of Visit: July 30, 2020  Primary care provider: Kandy Duff          Assessment and Plan:   # Seropositive rheumatoid arthritis (RF+, ACPA+ in 2019):   Patient describes marked improvement in bilateral sole of foot pain, reduced morning stiffness, and improved physical activity toleration since last visit.  Video exam shows no inflammatory joint changes. Blood work from June 25, 2020 showed creatinine, transaminases, and CBC all normal.    Former protracted morning stiffness, bilateral sole of foot pain has markedly improved since increasing methotrexate to 20 mg weekly in spring, and to 25 mg weekly in May 2020.  Rheumatoid arthritis is controlled.  I recommend continuing current therapy.  We discussed the following plan:    1.  Continue methotrexate 10 tablets weekly.  While taking methotrexate, limit alcohol intake to 2 drinks per week.  Undergo screening tests for liver function and blood counts every 3 to 4 months.      I recommend follow-up in 4 months time.    Moisés aVz MD  Staff Rheumatologist, [facility]            Active Problem List:     Patient Active Problem List    Diagnosis Date Noted     Pes planus of both feet 12/23/2019     Priority: Medium     Morbid obesity (H) 11/30/2018     Priority: Medium     Obesity 01/30/2014     Priority: Medium     Hypertriglyceridemia 01/30/2014     Priority: Medium     Plantar fasciitis 01/30/2014     Priority: Medium     CARDIOVASCULAR SCREENING; LDL GOAL LESS THAN 160 10/31/2010     Priority: Medium     Asthma, mild intermittent      Priority: Medium            History of Present Illness:   Gonzales Melo is a 49 year old male who presents for follow up of rheumatoid arthritis.  I last evaluated the patient in 4-2020, when he reported the chronic lower extremity pain.  I recommended increasing methotrexate at  "that time, and giving a prednisone course for possible inflammatory arthritis flare.    Interval history 07-:  He is better.    He had a back injury at work on 7-13-20. He started a week of prednisone therapy. Since then, indeed, even since before he started taking the prednisone, he has noted marked reduction in bialteral sole of foot pain. Prednisone stopped over a week ago. Symptom intensity is down to 2 or 3.  Minimal morning pain.  And minimal pain with completing a full shift at work.  He has been taking increased methotrexate 10 tabs weekly for at least 8 weeks. No AE from methotrexate. Minimal morning stiffness. No swollen or tender joints.    He did see Podiatry; he has continued to wear orthotics, but he does not think they have been effective without the methotrexate.    Prior history:    Interval history, 4-28-20:    He reports doing \"ok\". He has not noted any improvement (< 5%) in his joints since starting methotrexate in 1-2020. He take 6 tabs once weekly, no difficulty. He still notes significant pain, after periods of immobility, in feet and ankles initially. He is able to walk off the pain by moving around, but it recurs as soon as he sits for awhile. Pain is on the soles and the sides of his feet; it also goes across the tops of the feet and in the base of his toes. No swelling. Mornings, he is stiff for ~ 10 minutes. 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 He is not using NSAIDs or OTC tylenol.    He recently went for a 3 mile walk; he had a tough time finishing the walk due to foot/ankle pain.     His knees give him pain, but more modest than in the feet. He attributes this pain to \"wear and tear\". UE joints are ok apart from stiffness in the fingers.    He has been to Podiatry, and has been given shoe inserts; he has had no help from the inserts, either.    History, January 2020  patient was in his usual health until early in the year 2000.  He developed gradual onset " of bilateral hand pain and finger swelling.  He was reportedly evaluated in rheumatology and diagnosed with rheumatoid arthritis.  He was treated with methotrexate and sulfasalazine.  Joint pain spontaneously resolved over months.  Patient stopped treatment after 1 year due to resolution of symptoms.  He was able to tolerate the medications well at the time.    He remained in excellent health until approximately 6 months ago, in the middle of 2019.  At that time, he developed gradual onset of bilateral foot, ankle, and knee pain.  Hands were not affected.  Pain in these joints was noted to be worse after a period of immobility.  There was no visible swelling in the affected joints, however there was intermittent soreness at the left first and second toes.  Ibuprofen was variably effective.  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  Morning stiffness has been less than 30 minutes throughout the last 6 months, but the frequency and severity of joint pain has gradually increased.    In November 2019, he experienced a back injury while working with the patient.  He has not been at work for the past 5 weeks due to that injury and is gradually recovering.  He has no sciatic symptoms bowel or bladder control issues, or balance problems.  Energy level has been good, and appetite has under has been unchanged.       Review of Systems:     Constitutional: negative  Skin: negative  Eyes: negative  Ears/Nose/Throat: negative  Respiratory: No shortness of breath, dyspnea on exertion, cough, or hemoptysis  Cardiovascular: negative  Gastrointestinal: negative  Genitourinary: negative  Musculoskeletal: hpi  Neurologic: negative  Psychiatric: negative  Hematologic/Lymphatic/Immunologic: negative  Endocrine: negative          Past Medical History:     Past Medical History:   Diagnosis Date     Arthritis     RA     Asthma,  "mild intermittent      Obesity 1/30/2014     Plantar fasciitis 1/30/2014     Past Surgical History:   Procedure Laterality Date     [facility]ONOSCOPY  [facility]/30/2015    [facility]     COLONOSCOPY N/A 9/30/2015    Procedure: COLONOSCOPY;  Surgeon: Marvin Son MD;  Location:  GI     --recurrent asthma since the patient was in his mid 20s.  He has been treated with intermittent prednisone bursts 2-3 times per year.  He has never been intubated or hospitalized for asthma.  -Pyelonephritis as a teenager  - Sciatica with a right sided lumbar herniated disc.  --Seropositive rheumatoid arthritis: Recurrent symptoms in late 2019.  Started methotrexate with inadequate response at low-dose.  Had complete response with 25 mg methotrexate weekly in summer 2020.       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  He drinks no alcohol.  He has followed a \"keto\" diet for the last 6 months, and reports losing 40 pounds during that period.  He notes that increased amounts of chicken consumption is associated with increased severity of joint pain.  He denies frequent consumption of crustaceans or organ meats, and does not use aspirin or significant amounts of caffeine.         Family History:     Family History   Problem Relation Age of Onset     Family History Negative Mother      Hypertension Maternal Grandmother      Rheumatoid Arthritis Maternal Grandmother      Heart Disease Brother      Maternal grandmother had rheumatoid arthritis and cerebrovascular accident.  Maternal grandfather had coronary artery disease with an MI at age 55.       Allergies:     Allergies   Allergen Reactions     Mucinex Other (See Comments) and Hives     lip " swelling            Medications:     Current Outpatient Medications   Medication Sig Dispense Refill     albuterol (PROAIR HFA/PROVENTIL HFA/VENTOLIN HFA) 108 (90 Base) MCG/ACT inhaler Inhale 1-2 puffs into the lungs every 4 hours as needed for shortness of breath / dyspnea or wheezing 18 g 1     fluticasone (FLONASE) 50 MCG/ACT spray Spray 1-2 sprays into both nostrils daily 1 Bottle 11     ipratropium - albuterol 0.5 mg/2.5 mg/3 mL (DUONEB) 0.5-2.5 (3) MG/3ML neb solution Take 1 vial (3 mLs) by nebulization every 6 hours as needed for shortness of breath / dyspnea 30 vial 1     methotrexate 2.5 MG tablet Take 10 tablets (25 mg) by mouth every 7 days Labs every 8 - 12 weeks for refills. 96 tablet 3            Physical Exam:   There were no vitals taken for this visit.  Wt Readings from Last 4 Encounters:   05/21/20 145.2 kg (320 lb)   01/20/20 144.4 kg (318 lb 6.4 oz)   12/31/19 140.6 kg (310 lb)   12/23/19 140.6 kg (310 lb)     Constitutional: well-developed, appearing stated age; cooperative  Eyes: nl EOM, PERRLA, vision, conjunctiva, sclera  ENT: nl external ears, nose, hearing, lips, teeth, gums, throat  No mucous membrane lesions, normal saliva pool  Neck: no mass or thyroid enlargement  MS: MCPs and PIPs show no visible swelling; fist formation is excellent.  Range of motion in wrists, elbows, and shoulders is normal.  Skin: no nail pitting, alopecia, rash, nodules or lesions  Neuro: nl cranial nerves, strength, sensation, DTRs.   Psych: nl judgement, orientation, memory, affect.         Data:     No results found for any visits on 07/31/20.    RHEUM RESULTS Latest Ref Rng & Units 1/15/2020 4/7/2020 6/25/2020   SED RATE 0 - 15 mm/h - - -   CRP, INFLAMMATION 0.0 - 8.0 mg/L 4.8 - -   RHEUMATOID FACTOR <12 IU/mL - - -   LONA SCREEN BY EIA - - - -   AST 0 - 45 U/L 15 29 32   ALT 0 - 70 U/L 37 57 55   ALBUMIN 3.4 - 5.0 g/dL - - -   WBC 4.0 - 11.0 10e9/L 7.2 8.7 8.1   RBC 4.4 - 5.9 10e12/L 5.38 4.90 4.58   HGB 13.3  "- 17.7 g/dL 15.5 14.7 14.4   HCT 40.0 - 53.0 % 47.9 44.6 42.5   MCV 78 - 100 fl 89 91 93   MCHC 31.5 - 36.5 g/dL 32.4 33.0 33.9   RDW 10.0 - 15.0 % 14.2 15.1(H) 14.6    - 450 10e9/L 309 380 298   CREATININE 0.66 - 1.25 mg/dL - 0.84 0.95   GFR ESTIMATE, IF BLACK >60 mL/min/[1.73:m2] - >90 >90   GFR ESTIMATE >60 mL/min/[1.73:m2] - >90 >90       Rheumatoid Factor   Date Value Ref Range Status   12/23/2019 44 (H) <12 IU/mL Final   ,   Cyclic Citrullinated Peptide Antibody, IgG   Date Value Ref Range Status   01/15/2020 177 (H) <7 U/mL Final     Comment:     Positive     LONA Screen by EIA   Date Value Ref Range Status   09/16/2004 <1.0  Interpretation:  Negative  Final     RHEUM RESULTS Latest Ref Rng & Units 1/15/2020 4/7/2020 6/25/2020   SED RATE 0 - 15 mm/h - - -   CRP, INFLAMMATION 0.0 - 8.0 mg/L 4.8 - -   RHEUMATOID FACTOR <12 IU/mL - - -   LONA SCREEN BY EIA - - - -   AST 0 - 45 U/L 15 29 32   ALT 0 - 70 U/L 37 57 55   ALBUMIN 3.4 - 5.0 g/dL - - -   WBC 4.0 - 11.0 10e9/L 7.2 8.7 8.1   RBC 4.4 - 5.9 10e12/L 5.38 4.90 4.58   HGB 13.3 - 17.7 g/dL 15.5 14.7 14.4   HCT 40.0 - 53.0 % 47.9 44.6 42.5   MCV 78 - 100 fl 89 91 93   MCHC 31.5 - 36.5 g/dL 32.4 33.0 33.9   RDW 10.0 - 15.0 % 14.2 15.1(H) 14.6    - 450 10e9/L 309 380 298   CREATININE 0.66 - 1.25 mg/dL - 0.84 0.95   GFR ESTIMATE, IF BLACK >60 mL/min/[1.73:m2] - >90 >90   GFR ESTIMATE >60 mL/min/[1.73:m2] - >90 >90       Rheumatoid Factor   Date Value Ref Range Status   12/23/2019 44 (H) <12 IU/mL Final   ,   Cyclic Citrullinated Peptide Antibody, IgG   Date Value Ref Range Status   01/15/2020 177 (H) <7 U/mL Final     Comment:     Positive   ,  ,  ,  ,  ,  ,   LONA Screen by EIA   Date Value Ref Range Status   09/16/2004 <1.0  Interpretation:  Negative  Final     Reviewed Rheumatology lab flowsheet    Gonzales Melo is a 49 year old male who is being evaluated via a billable video visit.      The patient has been notified of following:     \"This " "video visit will be conducted via a call between you and your physician/provider. We have found that certain health care needs can be provided without the need for an in-person physical exam.  This service lets us provide the care you need with a video conversation.  If a prescription is necessary we can send it directly to your pharmacy.  If lab work is needed we can place an order for that and you can then stop by our lab to have the test done at a later time.    Video visits are billed at different rates depending on your insurance coverage.  Please reach out to your insurance provider with any questions.    If during the course of the call the physician/provider feels a video visit is not appropriate, you will not be charged for this service.\"    Patient has given verbal consent for Video visit? Yes  How would you like to obtain your AVS? MyChart    Will anyone else be joining your video visit? No        Video-Visit Details    Type of service:  Video Visit    Video Start Time: 10:09 AM  Video End Time: 10:30 AM    Originating Location (pt. Location): Home    Distant Location (provider location):  [facility] RHEUMATOLOGY     Platform used for Video Visit: Edison Vaz MD        "

## 2020-07-31 ENCOUNTER — VIRTUAL VISIT (OUTPATIENT)
Dept: RHEUMATOLOGY | Facility: CLINIC | Age: 50
End: 2020-07-31
Attending: INTERNAL MEDICINE
Payer: COMMERCIAL

## 2020-07-31 DIAGNOSIS — R76.8 RHEUMATOID FACTOR POSITIVE WITH CYCLIC CITRULLINATED PEPTIDE (CCP) ANTIBODY NEGATIVE: ICD-10-CM

## 2020-07-31 ASSESSMENT — PAIN SCALES - GENERAL: PAINLEVEL: MILD PAIN (3)

## 2020-07-31 NOTE — PATIENT INSTRUCTIONS
Diagnosis:  1.  Rheumatoid arthritis, improved.  Former sole of foot pain is improved in association with increased methotrexate, a brief course of prednisone, and a period of time with reduced weightbearing    Plan:  1.  Continue methotrexate 10 tablets weekly.  While taking methotrexate, limit alcohol intake to 2 drinks per week.  Undergo screening tests for liver function and blood counts every 3 to 4 months.

## 2020-07-31 NOTE — LETTER
7/31/2020       RE: Gonzales Melo  [address]  [facility] [address]     Dear Colleague,    Thank you for referring your patient, Gonzales Melo, to the [facility] RHEUMATOLOGY at [facility]. Please see a copy of my visit note below.    Rheumatology Clinic Visit-telephone  Moisés Vaz M.D.     Gonzales Melo MRN# [id number]   YOB: 1970 Age: 49 year old     Date of Visit: July 30, 2020  Primary care provider: Kandy Duff          Assessment and Plan:   # Seropositive rheumatoid arthritis (RF+, ACPA+ in 2019):   Patient describes marked improvement in bilateral sole of foot pain, reduced morning stiffness, and improved physical activity toleration since last visit.  Video exam shows no inflammatory joint changes. Blood work from June 25, 2020 showed creatinine, transaminases, and CBC all normal.    Former protracted morning stiffness, bilateral sole of foot pain has markedly improved since increasing methotrexate to 20 mg weekly in spring, and to 25 mg weekly in May 2020.  Rheumatoid arthritis is controlled.  I recommend continuing current therapy.  We discussed the following plan:    1.  Continue methotrexate 10 tablets weekly.  While taking methotrexate, limit alcohol intake to 2 drinks per week.  Undergo screening tests for liver function and blood counts every 3 to 4 months.      I recommend follow-up in 4 months time.    Moisés Vaz MD  Staff Rheumatologist, [facility]            Active Problem List:     Patient Active Problem List    Diagnosis Date Noted     Pes planus of both feet 12/23/2019     Priority: Medium     Morbid obesity (H) 11/30/2018     Priority: Medium     Obesity 01/30/2014     Priority: Medium     Hypertriglyceridemia 01/30/2014     Priority: Medium     Plantar fasciitis 01/30/2014     Priority: Medium     CARDIOVASCULAR SCREENING; LDL GOAL LESS THAN 160 10/31/2010     Priority: Medium     Asthma, mild intermittent      " Priority: Medium            History of Present Illness:   Gonzales Melo is a 49 year old male who presents for follow up of rheumatoid arthritis.  I last evaluated the patient in 4-2020, when he reported the chronic lower extremity pain.  I recommended increasing methotrexate at that time, and giving a prednisone course for possible inflammatory arthritis flare.    Interval history 07-:  He is better.    He had a back injury at work on 7-13-20. He started a week of prednisone therapy. Since then, indeed, even since before he started taking the prednisone, he has noted marked reduction in bialteral sole of foot pain. Prednisone stopped over a week ago. Symptom intensity is down to 2 or 3.  Minimal morning pain.  And minimal pain with completing a full shift at work.  He has been taking increased methotrexate 10 tabs weekly for at least 8 weeks. No AE from methotrexate. Minimal morning stiffness. No swollen or tender joints.    He did see Podiatry; he has continued to wear orthotics, but he does not think they have been effective without the methotrexate.    Prior history:    Interval history, 4-28-20:    He reports doing \"ok\". He has not noted any improvement (< 5%) in his joints since starting methotrexate in 1-2020. He take 6 tabs once weekly, no difficulty. He still notes significant pain, after periods of immobility, in feet and ankles initially. He is able to walk off the pain by moving around, but it recurs as soon as he sits for awhile. Pain is on the soles and the sides of his feet; it also goes across the tops of the feet and in the base of his toes. No swelling. Mornings, he is stiff for ~ 10 minutes. 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 He is not using NSAIDs or OTC tylenol.    He recently went for a 3 mile walk; he had a tough time finishing the walk due to foot/ankle pain.     His knees give him pain, but more modest than in the feet. He attributes this pain to \"wear and " "tear\". UE joints are ok apart from stiffness in the fingers.    He has been to Podiatry, and has been given shoe inserts; he has had no help from the inserts, either.    History, January 2020  patient was in his usual health until early in the year 2000.  He developed gradual onset of bilateral hand pain and finger swelling.  He was reportedly evaluated in rheumatology and diagnosed with rheumatoid arthritis.  He was treated with methotrexate and sulfasalazine.  Joint pain spontaneously resolved over months.  Patient stopped treatment after 1 year due to resolution of symptoms.  He was able to tolerate the medications well at the time.    He remained in excellent health until approximately 6 months ago, in the middle of 2019.  At that time, he developed gradual onset of bilateral foot, ankle, and knee pain.  Hands were not affected.  Pain in these joints was noted to be worse after a period of immobility.  There was no visible swelling in the affected joints, however there was intermittent soreness at the left first and second toes.  Ibuprofen was variably effective.  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  Morning stiffness has been less than 30 minutes throughout the last 6 months, but the frequency and severity of joint pain has gradually increased.    In November 2019, he experienced a back injury while working with the patient.  He has not been at work for the past 5 weeks due to that injury and is gradually recovering.  He has no sciatic symptoms bowel or bladder control issues, or balance problems.  Energy level has been good, and appetite has under has been unchanged.       Review of Systems:     Constitutional: negative  Skin: negative  Eyes: negative  Ears/Nose/Throat: negative  Respiratory: No shortness of breath, dyspnea on exertion, cough, or hemoptysis  Cardiovascular: " "negative  Gastrointestinal: negative  Genitourinary: negative  Musculoskeletal: hpi  Neurologic: negative  Psychiatric: negative  Hematologic/Lymphatic/Immunologic: negative  Endocrine: negative          Past Medical History:     Past Medical History:   Diagnosis Date     Arthritis     RA     Asthma, mild intermittent      Obesity 1/30/2014     Plantar fasciitis 1/30/2014     Past Surgical History:   Procedure Laterality Date     COLONOSCOPY  9/30/2015    Dr. Son [facility]     COLONOSCOPY N/A 9/30/2015    Procedure: COLONOSCOPY;  Surgeon: Marvin Son MD;  Location:  GI     --recurrent asthma since the patient was in his mid 20s.  He has been treated with intermittent prednisone bursts 2-3 times per year.  He has never been intubated or hospitalized for asthma.  -Pyelonephritis as a teenager  - Sciatica with a right sided lumbar herniated disc.  --Seropositive rheumatoid arthritis: Recurrent symptoms in late 2019.  Started methotrexate with inadequate response at low-dose.  Had complete response with 25 mg methotrexate weekly in summer 2020.       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  He drinks no alcohol.  He has followed a \"keto\" diet for the last 6 months, and reports losing 40 pounds during that period.  He notes that increased amounts of chicken consumption is associated with increased severity of joint pain.  He denies frequent consumption of crustaceans or organ meats, and does not use aspirin or significant amounts of caffeine.         Family History:     Family History   Problem Relation Age of Onset     Family History Negative Mother      Hypertension Maternal Grandmother      Rheumatoid Arthritis Maternal " Grandmother      Heart Disease Brother      Maternal grandmother had rheumatoid arthritis and cerebrovascular accident.  Maternal grandfather had coronary artery disease with an MI at age 55.       Allergies:     Allergies   Allergen Reactions     Mucinex Other (See Comments) and Hives     lip swelling            Medications:     Current Outpatient Medications   Medication Sig Dispense Refill     albuterol (PROAIR HFA/PROVENTIL HFA/VENTOLIN HFA) 108 (90 Base) MCG/ACT inhaler Inhale 1-2 puffs into the lungs every 4 hours as needed for shortness of breath / dyspnea or wheezing 18 g 1     fluticasone (FLONASE) 50 MCG/ACT spray Spray 1-2 sprays into both nostrils daily 1 Bottle 11     ipratropium - albuterol 0.5 mg/2.5 mg/3 mL (DUONEB) 0.5-2.5 (3) MG/3ML neb solution Take 1 vial (3 mLs) by nebulization every 6 hours as needed for shortness of breath / dyspnea 30 vial 1     methotrexate 2.5 MG tablet Take 10 tablets (25 mg) by mouth every 7 days Labs every 8 - 12 weeks for refills. 96 tablet 3            Physical Exam:   There were no vitals taken for this visit.  Wt Readings from Last 4 Encounters:   05/21/20 145.2 kg (320 lb)   01/20/20 144.4 kg (318 lb 6.4 oz)   12/31/19 140.6 kg (310 lb)   12/23/19 140.6 kg (310 lb)     Constitutional: well-developed, appearing stated age; cooperative  Eyes: nl EOM, PERRLA, vision, conjunctiva, sclera  ENT: nl external ears, nose, hearing, lips, teeth, gums, throat  No mucous membrane lesions, normal saliva pool  Neck: no mass or thyroid enlargement  MS: MCPs and PIPs show no visible swelling; fist formation is excellent.  Range of motion in wrists, elbows, and shoulders is normal.  Skin: no nail pitting, alopecia, rash, nodules or lesions  Neuro: nl cranial nerves, strength, sensation, DTRs.   Psych: nl judgement, orientation, memory, affect.         Data:     No results found for any visits on 07/31/20.    RHEUM RESULTS Latest Ref Rng & Units 1/15/2020 4/7/2020 6/25/2020   SED RATE  0 - 15 mm/h - - -   CRP, INFLAMMATION 0.0 - 8.0 mg/L 4.8 - -   RHEUMATOID FACTOR <12 IU/mL - - -   LONA SCREEN BY EIA - - - -   AST 0 - 45 U/L 15 29 32   ALT 0 - 70 U/L 37 57 55   ALBUMIN 3.4 - 5.0 g/dL - - -   WBC 4.0 - 11.0 10e9/L 7.2 8.7 8.1   RBC 4.4 - 5.9 10e12/L 5.38 4.90 4.58   HGB 13.3 - 17.7 g/dL 15.5 14.7 14.4   HCT 40.0 - 53.0 % 47.9 44.6 42.5   MCV 78 - 100 fl 89 91 93   MCHC 31.5 - 36.5 g/dL 32.4 33.0 33.9   RDW 10.0 - 15.0 % 14.2 15.1(H) 14.6    - 450 10e9/L 309 380 298   CREATININE 0.66 - 1.25 mg/dL - 0.84 0.95   GFR ESTIMATE, IF BLACK >60 mL/min/[1.73:m2] - >90 >90   GFR ESTIMATE >60 mL/min/[1.73:m2] - >90 >90       Rheumatoid Factor   Date Value Ref Range Status   12/23/2019 44 (H) <12 IU/mL Final   ,   Cyclic Citrullinated Peptide Antibody, IgG   Date Value Ref Range Status   01/15/2020 177 (H) <7 U/mL Final     Comment:     Positive     LONA Screen by EIA   Date Value Ref Range Status   09/16/2004 <1.0  Interpretation:  Negative  Final     RHEUM RESULTS Latest Ref Rng & Units 1/15/2020 4/7/2020 6/25/2020   SED RATE 0 - 15 mm/h - - -   CRP, INFLAMMATION 0.0 - 8.0 mg/L 4.8 - -   RHEUMATOID FACTOR <12 IU/mL - - -   LONA SCREEN BY EIA - - - -   AST 0 - 45 U/L 15 29 32   ALT 0 - 70 U/L 37 57 55   ALBUMIN 3.4 - 5.0 g/dL - - -   WBC 4.0 - 11.0 10e9/L 7.2 8.7 8.1   RBC 4.4 - 5.9 10e12/L 5.38 4.90 4.58   HGB 13.3 - 17.7 g/dL 15.5 14.7 14.4   HCT 40.0 - 53.0 % 47.9 44.6 42.5   MCV 78 - 100 fl 89 91 93   MCHC 31.5 - 36.5 g/dL 32.4 33.0 33.9   RDW 10.0 - 15.0 % 14.2 15.1(H) 14.6    - 450 10e9/L 309 380 298   CREATININE 0.66 - 1.25 mg/dL - 0.84 0.95   GFR ESTIMATE, IF BLACK >60 mL/min/[1.73:m2] - >90 >90   GFR ESTIMATE >60 mL/min/[1.73:m2] - >90 >90       Rheumatoid Factor   Date Value Ref Range Status   12/23/2019 44 (H) <12 IU/mL Final   ,   Cyclic Citrullinated Peptide Antibody, IgG   Date Value Ref Range Status   01/15/2020 177 (H) <7 U/mL Final     Comment:     Positive   ,  ,  ,  ,  ,  ,   LONA  "Screen by EIA   Date Value Ref Range Status   09/16/2004 <1.0  Interpretation:  Negative  Final     Reviewed Rheumatology lab flowsheet    Gonzales Melo is a 49 year old male who is being evaluated via a billable video visit.      The patient has been notified of following:     \"This video visit will be conducted via a call between you and your physician/provider. We have found that certain health care needs can be provided without the need for an in-person physical exam.  This service lets us provide the care you need with a video conversation.  If a prescription is necessary we can send it directly to your pharmacy.  If lab work is needed we can place an order for that and you can then stop by our lab to have the test done at a later time.    Video visits are billed at different rates depending on your insurance coverage.  Please reach out to your insurance provider with any questions.    If during the course of the call the physician/provider feels a video visit is not appropriate, you will not be charged for this service.\"    Patient has given verbal consent for Video visit? Yes  How would you like to obtain your AVS? MyChart    Will anyone else be joining your video visit? No        Video-Visit Details    Type of service:  Video Visit    Video Start Time: 10:09 AM  Video End Time: 10:30 AM    Originating Location (pt. Location): Home    Distant Location (provider location):  [facility] RHEUMATOLOGY     Platform used for Video Visit: Edison Vaz MD      "

## 2020-09-25 ENCOUNTER — TRANSFERRED RECORDS (OUTPATIENT)
Dept: HEALTH INFORMATION MANAGEMENT | Facility: CLINIC | Age: 50
End: 2020-09-25

## 2020-10-02 ENCOUNTER — OFFICE VISIT (OUTPATIENT)
Dept: FAMILY MEDICINE | Facility: CLINIC | Age: 50
End: 2020-10-02
Payer: OTHER MISCELLANEOUS

## 2020-10-02 VITALS — TEMPERATURE: 98.8 F

## 2020-10-02 DIAGNOSIS — E66.01 MORBID OBESITY (H): ICD-10-CM

## 2020-10-02 DIAGNOSIS — M54.41 RIGHT-SIDED LOW BACK PAIN WITH RIGHT-SIDED SCIATICA, UNSPECIFIED CHRONICITY: Primary | ICD-10-CM

## 2020-10-02 DIAGNOSIS — M54.16 LUMBAR RADICULOPATHY: ICD-10-CM

## 2020-10-02 PROCEDURE — 99214 OFFICE O/P EST MOD 30 MIN: CPT | Performed by: FAMILY MEDICINE

## 2020-10-02 ASSESSMENT — ASTHMA QUESTIONNAIRES
QUESTION_5 LAST FOUR WEEKS HOW WOULD YOU RATE YOUR ASTHMA CONTROL: COMPLETELY CONTROLLED
QUESTION_2 LAST FOUR WEEKS HOW OFTEN HAVE YOU HAD SHORTNESS OF BREATH: NOT AT ALL
QUESTION_4 LAST FOUR WEEKS HOW OFTEN HAVE YOU USED YOUR RESCUE INHALER OR NEBULIZER MEDICATION (SUCH AS ALBUTEROL): NOT AT ALL
QUESTION_1 LAST FOUR WEEKS HOW MUCH OF THE TIME DID YOUR ASTHMA KEEP YOU FROM GETTING AS MUCH DONE AT WORK, SCHOOL OR AT HOME: NONE OF THE TIME
QUESTION_3 LAST FOUR WEEKS HOW OFTEN DID YOUR ASTHMA SYMPTOMS (WHEEZING, COUGHING, SHORTNESS OF BREATH, CHEST TIGHTNESS OR PAIN) WAKE YOU UP AT NIGHT OR EARLIER THAN USUAL IN THE MORNING: NOT AT ALL
ACT_TOTALSCORE: 25

## 2020-10-02 ASSESSMENT — ENCOUNTER SYMPTOMS: BACK PAIN: 1

## 2020-10-02 ASSESSMENT — MIFFLIN-ST. JEOR: SCORE: 2511.91

## 2020-10-02 NOTE — PATIENT INSTRUCTIONS
Increase intake of vegetables to 50% more than you are doing now  No more late night eating    Follow up in 2 weeks or sooner if needed

## 2020-10-02 NOTE — PROGRESS NOTES
Subjective     Gonzales Melo is a 49 year old male who presents to clinic today for the following health issues:    Back Pain     History of Present Illness       Back Pain:  He presents for follow up of back pain. Patient's back pain is a recurring problem.  Location of back pain:  Right lower back, left lower back and other  Description of back pain: dull ache and sharp  Back pain spreads: right thigh    Since patient first noticed back pain, pain is: unchanged  Does back pain interfere with his job:  Yes      He eats 2-3 servings of fruits and vegetables daily.He consumes 0 sweetened beverage(s) daily.He exercises with enough effort to increase his heart rate 9 or less minutes per day.  He exercises with enough effort to increase his heart rate 3 or less days per week.   He is taking medications regularly.     Patient here today to discuss back pain and workman's comp.    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 He recovered well and was able to loose weight on keto diet. On 7/13 he reinjured his back and was seen in the ER for evaluation.   He has been seeing his chiropractor (Dr. Chet Don) since then. Due to this injury he has been out of work since July. He had an MRI which showed disc herniation at L4-L5.   Per patient, workman's comp would like his care to be resumed by a physician rather than his chiropractor.   He plans on starting physical therapy in a few weeks pending clearance from insurance.   Has been getting adjustments 3 times/week, tried acupuncture and massage therapy.   Due to the nature of his job and risk of reinjuring he has been unable to return to work since July. If he stands too long he gets shooting pain down right thigh. States he has constant numbness in right thigh.   Low back pain increased with movement. occasionally takes motrin and tylenol for the pain.         Wt Readings from Last 4 Encounters:   10/02/20 (!) (P) 157.7 kg  "(347 lb 11.2 oz)   05/21/20 145.2 kg (320 lb)   01/20/20 144.4 kg (318 lb 6.4 oz)   12/31/19 140.6 kg (310 lb)         Review of Systems   Musculoskeletal: Positive for back pain.      Constitutional, HEENT, cardiovascular, pulmonary, GI, , musculoskeletal, neuro, skin, endocrine and psych systems are negative, except as otherwise noted.      Objective    BP (P) 111/75 (BP Location: Right arm, Patient Position: Chair, Cuff Size: Adult Large)   Pulse (P) 117   Temp 98.8  F (37.1  C) (Oral)   Resp (P) 14   Ht (P) 1.88 m (6' 2\")   Wt (!) (P) 157.7 kg (347 lb 11.2 oz)   SpO2 (P) 95%   BMI (P) 44.64 kg/m    Body mass index is 44.64 kg/m  (pended).  Physical Exam   GENERAL: healthy, alert and no distress  MS: right low back TTP, normal gait,   NEURO: decreased sensation right thigh   SKIN: no suspicious lesions or rashes  PSYCH: mentation appears normal, affect normal/bright            Assessment & Plan     1.Right-sided low back pain with right-sided sciatica, unspecified chronicity  2. Lumbar radiculopathy  - needs improvement. Patient will benefit from physical therapy. Referral placed today.   Discussed starting on gabapentin- he is hesitant about this and would rather wait.   I recommend follow up with ortho-spine for further evaluation and to help aid din getting patient back to a seemingly normal life  - Orthopedic & Spine  Referral; Future  - [facility]; Future    3. Morbid obesity (H)  - regained weight due to above. Goals set today to help with this. Loosing some weight might also help with the back pain.           See Patient Instructions    Return in about 2 weeks (around 10/16/2020) for in person, .    Laura Montalvo MD  [facility]"

## 2020-10-03 ASSESSMENT — ASTHMA QUESTIONNAIRES: ACT_TOTALSCORE: 25

## 2020-10-07 ENCOUNTER — THERAPY VISIT (OUTPATIENT)
Dept: PHYSICAL THERAPY | Facility: CLINIC | Age: 50
End: 2020-10-07
Attending: FAMILY MEDICINE
Payer: OTHER MISCELLANEOUS

## 2020-10-07 DIAGNOSIS — M54.16 LUMBAR RADICULOPATHY: ICD-10-CM

## 2020-10-07 DIAGNOSIS — M54.41 RIGHT-SIDED LOW BACK PAIN WITH RIGHT-SIDED SCIATICA, UNSPECIFIED CHRONICITY: ICD-10-CM

## 2020-10-07 PROCEDURE — 97110 THERAPEUTIC EXERCISES: CPT | Mod: GP | Performed by: PHYSICAL THERAPIST

## 2020-10-07 PROCEDURE — 97161 PT EVAL LOW COMPLEX 20 MIN: CPT | Mod: GP | Performed by: PHYSICAL THERAPIST

## 2020-10-07 NOTE — PROGRESS NOTES
[facility] Initial Evaluation  Subjective:    Therapist Generated HPI Evaluation  Problem details: Sylvain is being seen today for low back pain with radiation into his right thigh and leg as a result of a work related injury on 7/13/2020..         Affected Side: and right leg.    This is a new (7/13/2020) condition.  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    Site of Pain: shoots across the lumabr spine bilaterally.    Radiates to: shooting into thigh.     Associated with: numbness in the anterior and lateral right thigh. Symptoms are exacerbated by bending (standing, walking, weight bearing)  Relieved by: sitting still, switching positions, putting foot on a step when standing still, occasional use of tylenol and acetominophen.                              Objective:        Flexibility/Screens:   Positive screens:  Lumbar                   Lumbar/SI Evaluation  ROM:    AROM Lumbar:   Flexion:            50% (painful)  Ext:                    50%   Side Bend:        Left:  WNL    Right:  WNL  Rotation:           Left:  WNL    Right:  WNL  Side Glide:        Left:     Right:         Strength: Core strength = 3/5  Lumbar Myotomes:  normal            Lumbar DTR's:  not assessed          Neural Tension/Mobility:  Lumbar:  Normal      Left side:SLR; SLR w/DF or Slump  negative.     Right side:   SLR w/DF; Slump or SLR  negative.                                                        General     ROS    Assessment/Plan:    Patient is a 49 year old male with lumbar complaints.    Patient has the following significant findings with corresponding treatment plan.                Diagnosis 1:  Right Lumbar Radiculopathy  Pain -  hot/cold therapy, self management, education and home program  Decreased ROM/flexibility - manual therapy and therapeutic exercise  Decreased strength - therapeutic exercise and therapeutic activities  Impaired muscle performance - neuro  re-education  Decreased function - therapeutic activities and home program  Impaired posture - neuro re-education    Therapy Evaluation Codes:   1) History comprised of:   Personal factors that impact the plan of care:      Work status.    Comorbidity factors that impact the plan of care are:      Asthma and Overweight.     Medications impacting care: None.  2) Examination of Body Systems comprised of:   Body structures and functions that impact the plan of care:      Lumbar spine.   Activity limitations that impact the plan of care are:      Bending, Lifting, Standing, Walking and Working.  3) Clinical presentation characteristics are:   Stable/Uncomplicated.  4) Decision-Making    Low complexity using standardized patient assessment instrument and/or measureable assessment of functional outcome.  Cumulative Therapy Evaluation is: Low complexity.    Previous and current functional limitations:  (See Goal Flow Sheet for this information)    Short term and Long term goals: (See Goal Flow Sheet for this information)     Communication ability:  Patient appears to be able to clearly communicate and understand verbal and written communication and follow directions correctly.  Treatment Explanation - The following has been discussed with the patient:   RX ordered/plan of care  Anticipated outcomes  Possible risks and side effects  This patient would benefit from PT intervention to resume normal activities.   Rehab potential is good.    Frequency:  1 X week, once daily  Duration:  for 8 weeks  Discharge Plan:  Achieve all LTG.  Independent in home treatment program.  Reach maximal therapeutic benefit.    Please refer to the daily flowsheet for treatment today, total treatment time and time spent performing 1:1 timed codes.

## 2020-10-09 ENCOUNTER — MYC MEDICAL ADVICE (OUTPATIENT)
Dept: FAMILY MEDICINE | Facility: CLINIC | Age: 50
End: 2020-10-09

## 2020-10-09 NOTE — LETTER
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   For further questions or concerns please let us know.       Sincerely,          Laura Montalvo MD

## 2020-10-09 NOTE — TELEPHONE ENCOUNTER
Letter sent to patient my-chart, Left message VM to inform patient    Shannan Farmer/HAILY  [place]---[facility]

## 2020-10-12 ENCOUNTER — OFFICE VISIT (OUTPATIENT)
Dept: ORTHOPEDICS | Facility: CLINIC | Age: 50
End: 2020-10-12
Payer: OTHER MISCELLANEOUS

## 2020-10-12 VITALS
SYSTOLIC BLOOD PRESSURE: 132 MMHG | HEIGHT: 74 IN | BODY MASS INDEX: 40.43 KG/M2 | DIASTOLIC BLOOD PRESSURE: 74 MMHG | WEIGHT: 315 LBS

## 2020-10-12 DIAGNOSIS — M51.369 LUMBAR DEGENERATIVE DISC DISEASE: Primary | ICD-10-CM

## 2020-10-12 DIAGNOSIS — M51.16 LUMBAR DISC HERNIATION WITH RADICULOPATHY: ICD-10-CM

## 2020-10-12 DIAGNOSIS — Z02.6 ENCOUNTER RELATED TO WORKER'S COMPENSATION CLAIM: ICD-10-CM

## 2020-10-12 DIAGNOSIS — E66.01 MORBID OBESITY (H): ICD-10-CM

## 2020-10-12 PROCEDURE — 99244 OFF/OP CNSLTJ NEW/EST MOD 40: CPT | Performed by: FAMILY MEDICINE

## 2020-10-12 ASSESSMENT — MIFFLIN-ST. JEOR: SCORE: 2508.73
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
## 2020-10-12 NOTE — LETTER
October 12, 2020      Gonzales Melo  [address] [facility] [address]        To Whom It May Concern:    Gonzales Melo was seen in our clinic on 10/12/20. He may return to work with the following: limited to light duty - lifting no greater than 10 pounds, no bending/stooping, no crouching/squatting. These restrictions are in place for 4 weeks. He will follow up in clinic for reevaluation and reassessment of these restrictions prior to expiration of letter.      Sincerely,          Zak Workman, DO [facility] on behalf of Dr. Workman
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
## 2020-10-12 NOTE — PATIENT INSTRUCTIONS
1. Lumbar degenerative disc disease    2. Lumbar disc herniation with radiculopathy    3. Encounter related to worker's compensation claim    4. Morbid obesity (H)      Pain / burning into the right leg is likely related to a disc herniation / nerve impingement ( right L5 nerve root)  Reviewed already obtained MRI - broad herniated degenerative disc at L5-S1 that contacts both right and left nerve roots  Letter for work: light duty x 4 weeks  Physical therapy: [facility] - [phone]. Switch to spine based therapist  Can continue with as needed Ibuprofen    Follow-up in 4 weeks / before letter expiration

## 2020-10-13 PROBLEM — M54.41 RIGHT-SIDED LOW BACK PAIN WITH RIGHT-SIDED SCIATICA, UNSPECIFIED CHRONICITY: Status: ACTIVE | Noted: 2020-10-13

## 2020-10-13 PROBLEM — M54.16 LUMBAR RADICULOPATHY: Status: ACTIVE | Noted: 2020-10-13

## 2020-10-14 ENCOUNTER — THERAPY VISIT (OUTPATIENT)
Dept: PHYSICAL THERAPY | Facility: CLINIC | Age: 50
End: 2020-10-14
Payer: OTHER MISCELLANEOUS

## 2020-10-14 DIAGNOSIS — M54.41 RIGHT-SIDED LOW BACK PAIN WITH RIGHT-SIDED SCIATICA, UNSPECIFIED CHRONICITY: ICD-10-CM

## 2020-10-14 DIAGNOSIS — M54.16 LUMBAR RADICULOPATHY: ICD-10-CM

## 2020-10-14 PROCEDURE — 97110 THERAPEUTIC EXERCISES: CPT | Mod: GP | Performed by: PHYSICAL THERAPIST

## 2020-10-14 PROCEDURE — 97530 THERAPEUTIC ACTIVITIES: CPT | Mod: GP | Performed by: PHYSICAL THERAPIST

## 2020-10-16 ENCOUNTER — HOSPITAL ENCOUNTER (OUTPATIENT)
Dept: LAB | Facility: CLINIC | Age: 50
Discharge: HOME | End: 2020-10-16
Attending: INTERNAL MEDICINE | Admitting: INTERNAL MEDICINE
Payer: COMMERCIAL

## 2020-10-16 DIAGNOSIS — R76.8 RHEUMATOID FACTOR POSITIVE WITH CYCLIC CITRULLINATED PEPTIDE (CCP) ANTIBODY NEGATIVE: ICD-10-CM

## 2020-10-16 LAB
ALT SERPL W P-5'-P-CCNC: 67 U/L (ref 0–70)
AST SERPL W P-5'-P-CCNC: 29 U/L (ref 0–45)
CREAT SERPL-MCNC: 0.93 MG/DL (ref 0.66–1.25)
ERYTHROCYTE [DISTWIDTH] IN BLOOD BY AUTOMATED COUNT: 14 % (ref 10–15)
GFR SERPL CREATININE-BSD FRML MDRD: >90 ML/MIN/{1.73_M2}
HCT VFR BLD AUTO: 44 % (ref 40–53)
HGB BLD-MCNC: 14.9 G/DL (ref 13.3–17.7)
MCH RBC QN AUTO: 31.9 PG (ref 26.5–33)
MCHC RBC AUTO-ENTMCNC: 33.9 G/DL (ref 31.5–36.5)
MCV RBC AUTO: 94 FL (ref 78–100)
PLATELET # BLD AUTO: 328 10E9/L (ref 150–450)
RBC # BLD AUTO: 4.67 10E12/L (ref 4.4–5.9)
WBC # BLD AUTO: 7.5 10E9/L (ref 4–11)

## 2020-10-16 PROCEDURE — 84460 ALANINE AMINO (ALT) (SGPT): CPT | Performed by: INTERNAL MEDICINE

## 2020-10-16 PROCEDURE — 85027 COMPLETE CBC AUTOMATED: CPT | Performed by: INTERNAL MEDICINE

## 2020-10-16 PROCEDURE — 36415 COLL VENOUS BLD VENIPUNCTURE: CPT | Performed by: INTERNAL MEDICINE

## 2020-10-16 PROCEDURE — 82565 ASSAY OF CREATININE: CPT | Performed by: INTERNAL MEDICINE

## 2020-10-16 PROCEDURE — 84450 TRANSFERASE (AST) (SGOT): CPT | Performed by: INTERNAL MEDICINE

## 2020-10-21 ENCOUNTER — THERAPY VISIT (OUTPATIENT)
Dept: PHYSICAL THERAPY | Facility: CLINIC | Age: 50
End: 2020-10-21
Payer: OTHER MISCELLANEOUS

## 2020-10-21 DIAGNOSIS — M54.16 LUMBAR RADICULOPATHY: ICD-10-CM

## 2020-10-21 DIAGNOSIS — M54.41 RIGHT-SIDED LOW BACK PAIN WITH RIGHT-SIDED SCIATICA, UNSPECIFIED CHRONICITY: ICD-10-CM

## 2020-10-21 PROCEDURE — 97530 THERAPEUTIC ACTIVITIES: CPT | Mod: GP | Performed by: PHYSICAL THERAPIST

## 2020-10-21 PROCEDURE — 97110 THERAPEUTIC EXERCISES: CPT | Mod: GP | Performed by: PHYSICAL THERAPIST

## 2020-10-26 ENCOUNTER — OFFICE VISIT (OUTPATIENT)
Dept: ORTHOPEDICS | Facility: CLINIC | Age: 50
End: 2020-10-26
Payer: OTHER MISCELLANEOUS

## 2020-10-26 VITALS
HEIGHT: 74 IN | SYSTOLIC BLOOD PRESSURE: 130 MMHG | DIASTOLIC BLOOD PRESSURE: 72 MMHG | BODY MASS INDEX: 40.43 KG/M2 | WEIGHT: 315 LBS

## 2020-10-26 DIAGNOSIS — Z02.6 ENCOUNTER RELATED TO WORKER'S COMPENSATION CLAIM: ICD-10-CM

## 2020-10-26 DIAGNOSIS — M51.369 LUMBAR DEGENERATIVE DISC DISEASE: Primary | ICD-10-CM

## 2020-10-26 DIAGNOSIS — E66.01 MORBID OBESITY (H): ICD-10-CM

## 2020-10-26 DIAGNOSIS — M51.16 LUMBAR DISC HERNIATION WITH RADICULOPATHY: ICD-10-CM

## 2020-10-26 PROCEDURE — 99214 OFFICE O/P EST MOD 30 MIN: CPT | Performed by: FAMILY MEDICINE

## 2020-10-26 ASSESSMENT — MIFFLIN-ST. JEOR: SCORE: 2503.73

## 2020-10-26 NOTE — PATIENT INSTRUCTIONS
1. Lumbar degenerative disc disease    2. Lumbar disc herniation with radiculopathy    3. Encounter related to worker's compensation claim    4. Morbid obesity (H)      Happy belated birthday!  Continue with spine based therapy for at least another 4 sessions  Continue light duty and extend to 11/23/20    Follow-up before letter expiration

## 2020-10-26 NOTE — LETTER
October 26, 2020      Gonzales Melo  [address]  [place] [address]      To Whom It May Concern:    Gonzales Melo was seen in our clinic on 10/26/20. He is making some improvement with structured spine based therapy but continues to require light duty restrictions. He may continue to work with the following: limited to light duty - lifting no greater than 10 pounds, no bending/stooping, no crouching/squatting. These restrictions are in place until November 23, 2020. He will follow up in clinic for reevaluation and reassessment of these restrictions prior to expiration of letter.      Sincerely,          Zak Workman, DO [facility] on behalf of Dr. Workman

## 2020-10-26 NOTE — PROGRESS NOTES
"ASSESSMENT & PLAN    1. Lumbar degenerative disc disease    2. Lumbar disc herniation with radiculopathy    3. Encounter related to worker's compensation claim    4. Morbid obesity (H)      Happy belated birthday!  Continue with spine based therapy for at least another 4 sessions  Continue light duty and extend to 11/23/20  Recommend making weight loss a priority  If pain is not improved at follow-up visit consider an epidural steroid injection. Explained that injection will not change the herniation but can help with pain.  Lumbar MRI images were unable to be loaded into our PACS system.  Will hold onto disc and give to pain management if we need to progress to an injection.  Hold off on chiropractic care/manipulation    Follow-up before letter expiration with either Drs. Yeo or Silvio    -----    SUBJECTIVE:  Gonzales Melo is a 50 year old male who is seen in follow-up for low back pain.They were last seen 10/12/2020.     Since their last visit reports 0% - (About the same as last time). He notes continued numbness and shooting in right leg. They indicate that their current pain level is 5/10. They have tried rest/activity avoidance, ice, home exercises, physical therapy (2 visits), previous imaging (MRI 9/25/20) and work restrictions - light duty.      The patient is seen by themselves.    Patient's past medical, surgical, social, and family histories were reviewed today and no pertinent history related to patient's presenting problem.    REVIEW OF SYSTEMS:  Constitutional: NEGATIVE for fever, chills, change in weight  Skin: NEGATIVE for worrisome rashes, moles or lesions  GI/: NEGATIVE for bowel or bladder changes  Neuro: NEGATIVE for weakness, dizziness or paresthesias    OBJECTIVE:  /72   Ht 1.88 m (6' 2\")   Wt (!) 157.4 kg (347 lb)   BMI 44.55 kg/m     General: healthy, alert and in no distress  HEENT: no scleral icterus or conjunctival erythema  Skin: no suspicious lesions or rash. No " jaundice.  CV: regular rhythm by palpation, no pedal edema  Resp: normal respiratory effort without conversational dyspnea   Psych: normal mood and affect  Gait: normal steady gait with appropriate coordination and balance  Neuro: decreased sensation over anterolateral right thigh otherwise normal light touch sensory exam of the extremities.    MSK:  THORACIC/LUMBAR SPINE  Inspection:    No gross deformity/asymmetry  Palpation:    Mildly tender about the lumbar facet joints, very mildly tender left and right SI joint - improved from previous. Very mildly tender at right sciatic notch - improved from previous. Otherwise remainder of landmarks are nontender.  Range of Motion:     Lumbar flexion limited by pain    Lumbar extension limited by pain  Strength:    quadriceps 5/5, hamstrings 5/5, gastrocsoleus 5/5, tibialis anterior 5/5  Special Tests:    Positive: slump test (right) - increased axial pain but does not cause radiating pain    Zak Workman, DO CAM  [facility]

## 2020-10-26 NOTE — LETTER
10/26/2020         RE: Gonzales Melo  [address]  [place] [address]        Dear Colleague,    Thank you for referring your patient, Gonzales Melo, to the [facility] [place]. Please see a copy of my visit note below.    ASSESSMENT & PLAN    1. Lumbar degenerative disc disease    2. Lumbar disc herniation with radiculopathy    3. Encounter related to worker's compensation claim    4. Morbid obesity (H)      Happy belated birthday!  Continue with spine based therapy for at least another 4 sessions  Continue light duty and extend to 11/23/20  Recommend making weight loss a priority  If pain is not improved at follow-up visit consider an epidural steroid injection. Explained that injection will not change the herniation but can help with pain.  Lumbar MRI images were unable to be loaded into our PACS system.  Will hold onto disc and give to pain management if we need to progress to an injection.  Hold off on chiropractic care/manipulation    Follow-up before letter expiration with either Drs. Yeo or Silvio    -----    SUBJECTIVE:  Gonzales Melo is a 50 year old male who is seen in follow-up for low back pain.They were last seen 10/12/2020.     Since their last visit reports 0% - (About the same as last time). He notes continued numbness and shooting in right leg. They indicate that their current pain level is 5/10. They have tried rest/activity avoidance, ice, home exercises, physical therapy (2 visits), previous imaging (MRI 9/25/20) and work restrictions - light duty.      The patient is seen by themselves.    Patient's past medical, surgical, social, and family histories were reviewed today and no pertinent history related to patient's presenting problem.    REVIEW OF SYSTEMS:  Constitutional: NEGATIVE for fever, chills, change in weight  Skin: NEGATIVE for worrisome rashes, moles or lesions  GI/: NEGATIVE for bowel or bladder changes  Neuro: NEGATIVE  "for weakness, dizziness or paresthesias    OBJECTIVE:  /72   Ht 1.88 m (6' 2\")   Wt (!) 157.4 kg (347 lb)   BMI 44.55 kg/m     General: healthy, alert and in no distress  HEENT: no scleral icterus or conjunctival erythema  Skin: no suspicious lesions or rash. No jaundice.  CV: regular rhythm by palpation, no pedal edema  Resp: normal respiratory effort without conversational dyspnea   Psych: normal mood and affect  Gait: normal steady gait with appropriate coordination and balance  Neuro: decreased sensation over anterolateral right thigh otherwise normal light touch sensory exam of the extremities.    MSK:  THORACIC/LUMBAR SPINE  Inspection:    No gross deformity/asymmetry  Palpation:    Mildly tender about the lumbar facet joints, very mildly tender left and right SI joint - improved from previous. Very mildly tender at right sciatic notch - improved from previous. Otherwise remainder of landmarks are nontender.  Range of Motion:     Lumbar flexion limited by pain    Lumbar extension limited by pain  Strength:    quadriceps 5/5, hamstrings 5/5, gastrocsoleus 5/5, tibialis anterior 5/5  Special Tests:    Positive: slump test (right) - increased axial pain but does not cause radiating pain    Zak Workman DO [facility]            Again, thank you for allowing me to participate in the care of your patient.        Sincerely,        Zak Workman DO    "

## 2020-10-28 ENCOUNTER — THERAPY VISIT (OUTPATIENT)
Dept: PHYSICAL THERAPY | Facility: CLINIC | Age: 50
End: 2020-10-28
Payer: OTHER MISCELLANEOUS

## 2020-10-28 DIAGNOSIS — M54.41 RIGHT-SIDED LOW BACK PAIN WITH RIGHT-SIDED SCIATICA, UNSPECIFIED CHRONICITY: ICD-10-CM

## 2020-10-28 DIAGNOSIS — M54.16 LUMBAR RADICULOPATHY: ICD-10-CM

## 2020-10-28 PROCEDURE — 97530 THERAPEUTIC ACTIVITIES: CPT | Mod: GP | Performed by: PHYSICAL THERAPIST

## 2020-10-28 PROCEDURE — 97112 NEUROMUSCULAR REEDUCATION: CPT | Mod: GP | Performed by: PHYSICAL THERAPIST

## 2020-10-28 PROCEDURE — 97110 THERAPEUTIC EXERCISES: CPT | Mod: GP | Performed by: PHYSICAL THERAPIST

## 2020-11-04 ENCOUNTER — THERAPY VISIT (OUTPATIENT)
Dept: PHYSICAL THERAPY | Facility: CLINIC | Age: 50
End: 2020-11-04
Payer: OTHER MISCELLANEOUS

## 2020-11-04 DIAGNOSIS — M54.41 RIGHT-SIDED LOW BACK PAIN WITH RIGHT-SIDED SCIATICA, UNSPECIFIED CHRONICITY: ICD-10-CM

## 2020-11-04 DIAGNOSIS — M54.16 LUMBAR RADICULOPATHY: ICD-10-CM

## 2020-11-04 PROCEDURE — 97530 THERAPEUTIC ACTIVITIES: CPT | Mod: GP | Performed by: PHYSICAL THERAPIST

## 2020-11-04 PROCEDURE — 97110 THERAPEUTIC EXERCISES: CPT | Mod: GP | Performed by: PHYSICAL THERAPIST

## 2020-11-04 PROCEDURE — 97112 NEUROMUSCULAR REEDUCATION: CPT | Mod: GP | Performed by: PHYSICAL THERAPIST

## 2020-11-13 ENCOUNTER — TELEPHONE (OUTPATIENT)
Dept: PHYSICAL THERAPY | Facility: CLINIC | Age: 50
End: 2020-11-13

## 2020-11-13 DIAGNOSIS — M54.41 RIGHT-SIDED LOW BACK PAIN WITH RIGHT-SIDED SCIATICA, UNSPECIFIED CHRONICITY: ICD-10-CM

## 2020-11-13 DIAGNOSIS — M54.16 LUMBAR RADICULOPATHY: ICD-10-CM

## 2020-11-17 ENCOUNTER — OFFICE VISIT (OUTPATIENT)
Dept: ORTHOPEDICS | Facility: CLINIC | Age: 50
End: 2020-11-17
Payer: OTHER MISCELLANEOUS

## 2020-11-17 ENCOUNTER — THERAPY VISIT (OUTPATIENT)
Dept: PHYSICAL THERAPY | Facility: CLINIC | Age: 50
End: 2020-11-17
Payer: OTHER MISCELLANEOUS

## 2020-11-17 VITALS
HEIGHT: 74 IN | DIASTOLIC BLOOD PRESSURE: 60 MMHG | WEIGHT: 315 LBS | SYSTOLIC BLOOD PRESSURE: 118 MMHG | BODY MASS INDEX: 40.43 KG/M2

## 2020-11-17 DIAGNOSIS — M51.369 LUMBAR DEGENERATIVE DISC DISEASE: Primary | ICD-10-CM

## 2020-11-17 DIAGNOSIS — M51.16 LUMBAR DISC HERNIATION WITH RADICULOPATHY: ICD-10-CM

## 2020-11-17 DIAGNOSIS — M54.41 RIGHT-SIDED LOW BACK PAIN WITH RIGHT-SIDED SCIATICA, UNSPECIFIED CHRONICITY: ICD-10-CM

## 2020-11-17 DIAGNOSIS — M54.16 LUMBAR RADICULOPATHY: ICD-10-CM

## 2020-11-17 PROCEDURE — 97112 NEUROMUSCULAR REEDUCATION: CPT | Mod: GP | Performed by: PHYSICAL THERAPIST

## 2020-11-17 PROCEDURE — 99213 OFFICE O/P EST LOW 20 MIN: CPT | Performed by: FAMILY MEDICINE

## 2020-11-17 PROCEDURE — 97530 THERAPEUTIC ACTIVITIES: CPT | Mod: GP | Performed by: PHYSICAL THERAPIST

## 2020-11-17 ASSESSMENT — MIFFLIN-ST. JEOR: SCORE: 2503.73

## 2020-11-17 NOTE — PROGRESS NOTES
ASSESSMENT & PLAN  Patient Instructions     1. Lumbar degenerative disc disease    2. Lumbar disc herniation with radiculopathy      -Patient is following up for low back pain due to degenerative disc disease  -Patient patient will continue physical therapy and home exercise program  -Patient will continue with work restrictions for the next 4 weeks.  -Patient reports persistent numbness of the right proximal lower extremity that does not correlate with injuries seen on his lumbar MRI.  -If numbness is persistent, to consider an EMG and nerve conduction study at the next visit.  -Call direct clinic number [[phone]] at any time with questions or concerns.    Albert Yeo MD [facility]  [facility]          -----    SUBJECTIVE:  Gonzales Melo is a 50 year old male who is seen in follow-up for low back pain.They were last seen Dr. Workman on 10/26/2020.     Since their last visit reports 0% - (About the same as last time). States has noticed bend backward a little better due to physical therapy, but the pain, and everything else is still the same. They indicate that their current pain level is 4/10 with movement, 0/10 at rest. States light duty for work is going well. States if he gets the shooting pain, he will just move positions. States Lena had told him that she wants to strength the core first and would like him to stay on his current restrictions. They have tried Tylenol, ibuprofen, home exercises and physical therapy (2 visits).      The patient is seen by themselves.    Patient's past medical, surgical, social, and family histories were reviewed today and no changes are noted.    REVIEW OF SYSTEMS:  Constitutional: NEGATIVE for fever, chills, change in weight  Skin: NEGATIVE for worrisome rashes, moles or lesions  GI/: NEGATIVE for bowel or bladder changes  Neuro: NEGATIVE for weakness, dizziness or paresthesias    OBJECTIVE:  /60   Ht 1.88  "m (6' 2\")   Wt (!) 157.4 kg (347 lb)   BMI 44.55 kg/m     General: healthy, alert and in no distress  HEENT: no scleral icterus or conjunctival erythema  Skin: no suspicious lesions or rash. No jaundice.  CV: regular rhythm by palpation, no pedal edema  Resp: normal respiratory effort without conversational dyspnea   Psych: normal mood and affect  Gait: normal steady gait with appropriate coordination and balance  Neuro: normal light touch sensory exam of the extremities.    MSK:  THORACIC/LUMBAR SPINE  Inspection:    No gross deformity/asymmetry  Palpation:    Mildly tender about the lumbar facet joints, very mildly tender left and right SI joint - improved from previous. Very mildly tender at right sciatic notch - improved from previous. Otherwise remainder of landmarks are nontender.  Range of Motion:     Lumbar flexion limited by pain    Lumbar extension limited by pain  Strength:    quadriceps 5/5, hamstrings 5/5, gastrocsoleus 5/5, tibialis anterior 5/5  Special Tests:    Positive: slump test (right) - increased axial pain but does not cause radiating pain    Independent visualization of the below image:    Patient's conditions were thoroughly discussed during today's visit with greater than 50% of the visit spent counseling the patient with total time spent face-to-face with the patient being 22 minutes.    Albert Yeo MD, [facility]        "

## 2020-11-17 NOTE — PATIENT INSTRUCTIONS
1. Lumbar degenerative disc disease    2. Lumbar disc herniation with radiculopathy      -Patient is following up for low back pain due to degenerative disc disease  -Patient patient will continue physical therapy and home exercise program  -Patient will continue with work restrictions for the next 4 weeks.  -Patient reports persistent numbness of the right proximal lower extremity that does not correlate with injuries seen on his lumbar MRI.  -If numbness is persistent, to consider an EMG and nerve conduction study at the next visit.  -Call direct clinic number [[phone]] at any time with questions or concerns.    Albert Yeo MD [facility]  [facility] [place]

## 2020-11-17 NOTE — LETTER
11/17/2020         RE: Gonzales Melo  [address]  [place] [address]        Dear Colleague,    Thank you for referring your patient, Gonzales Melo, to the [facility] SPORTS MEDICINE CLINIC [place]. Please see a copy of my visit note below.    ASSESSMENT & PLAN  Patient Instructions     1. Lumbar degenerative disc disease    2. Lumbar disc herniation with radiculopathy      -Patient is following up for low back pain due to degenerative disc disease  -Patient patient will continue physical therapy and home exercise program  -Patient will continue with work restrictions for the next 4 weeks.  -Patient reports persistent numbness of the right proximal lower extremity that does not correlate with injuries seen on his lumbar MRI.  -If numbness is persistent, to consider an EMG and nerve conduction study at the next visit.  -Call direct clinic number [[phone]] at any time with questions or concerns.    Albert Yeo MD [facility] Orthopedics and Sports Medicine  [facility]          -----    SUBJECTIVE:  Gonzales Melo is a 50 year old male who is seen in follow-up for low back pain.They were last seen Dr. Workman on 10/26/2020.     Since their last visit reports 0% - (About the same as last time). States has noticed bend backward a little better due to physical therapy, but the pain, and everything else is still the same. They indicate that their current pain level is 4/10 with movement, 0/10 at rest. States light duty for work is going well. States if he gets the shooting pain, he will just move positions. States Paredes had told him that she wants to strength the core first and would like him to stay on his current restrictions. They have tried Tylenol, ibuprofen, home exercises and physical therapy (2 visits).      The patient is seen by themselves.    Patient's past medical, surgical, social, and family histories were reviewed today and no changes are  "noted.    REVIEW OF SYSTEMS:  Constitutional: NEGATIVE for fever, chills, change in weight  Skin: NEGATIVE for worrisome rashes, moles or lesions  GI/: NEGATIVE for bowel or bladder changes  Neuro: NEGATIVE for weakness, dizziness or paresthesias    OBJECTIVE:  /60   Ht 1.88 m (6' 2\")   Wt (!) 157.4 kg (347 lb)   BMI 44.55 kg/m     General: healthy, alert and in no distress  HEENT: no scleral icterus or conjunctival erythema  Skin: no suspicious lesions or rash. No jaundice.  CV: regular rhythm by palpation, no pedal edema  Resp: normal respiratory effort without conversational dyspnea   Psych: normal mood and affect  Gait: normal steady gait with appropriate coordination and balance  Neuro: normal light touch sensory exam of the extremities.    MSK:  THORACIC/LUMBAR SPINE  Inspection:    No gross deformity/asymmetry  Palpation:    Mildly tender about the lumbar facet joints, very mildly tender left and right SI joint - improved from previous. Very mildly tender at right sciatic notch - improved from previous. Otherwise remainder of landmarks are nontender.  Range of Motion:     Lumbar flexion limited by pain    Lumbar extension limited by pain  Strength:    quadriceps 5/5, hamstrings 5/5, gastrocsoleus 5/5, tibialis anterior 5/5  Special Tests:    Positive: slump test (right) - increased axial pain but does not cause radiating pain    Independent visualization of the below image:    Patient's conditions were thoroughly discussed during today's visit with greater than 50% of the visit spent counseling the patient with total time spent face-to-face with the patient being 22 minutes.    Albert Yeo MD, [facility] Sports and Orthopedic Care            Again, thank you for allowing me to participate in the care of your patient.        Sincerely,        Albert Yeo, MD    "

## 2020-11-17 NOTE — LETTER
November 17, 2020      Gonzales Melo  [address]  [place] [address]        To Whom It May Concern:    Gonzales Melo was seen in our clinic on 11/17/20. He is making some improvement with structured spine based therapy but continues to require light duty restrictions. He may continue to work with the following: limited to light duty - lifting no greater than 10 pounds, no bending/stooping, no crouching/squatting. These restrictions are in place until December 15th 2020. He will follow up in clinic for reevaluation and reassessment of these restrictions prior to expiration of letter.      Sincerely,        Albert Yeo, MD

## 2020-11-24 ENCOUNTER — THERAPY VISIT (OUTPATIENT)
Dept: PHYSICAL THERAPY | Facility: CLINIC | Age: 50
End: 2020-11-24
Payer: OTHER MISCELLANEOUS

## 2020-11-24 DIAGNOSIS — M54.16 LUMBAR RADICULOPATHY: ICD-10-CM

## 2020-11-24 DIAGNOSIS — M54.41 RIGHT-SIDED LOW BACK PAIN WITH RIGHT-SIDED SCIATICA, UNSPECIFIED CHRONICITY: ICD-10-CM

## 2020-11-24 PROCEDURE — 97112 NEUROMUSCULAR REEDUCATION: CPT | Mod: GP | Performed by: PHYSICAL THERAPIST

## 2020-11-24 PROCEDURE — 97110 THERAPEUTIC EXERCISES: CPT | Mod: GP | Performed by: PHYSICAL THERAPIST

## 2020-12-01 ENCOUNTER — THERAPY VISIT (OUTPATIENT)
Dept: PHYSICAL THERAPY | Facility: CLINIC | Age: 50
End: 2020-12-01
Payer: OTHER MISCELLANEOUS

## 2020-12-01 DIAGNOSIS — M54.41 RIGHT-SIDED LOW BACK PAIN WITH RIGHT-SIDED SCIATICA, UNSPECIFIED CHRONICITY: ICD-10-CM

## 2020-12-01 DIAGNOSIS — M54.16 LUMBAR RADICULOPATHY: ICD-10-CM

## 2020-12-01 PROCEDURE — 97110 THERAPEUTIC EXERCISES: CPT | Mod: GP | Performed by: PHYSICAL THERAPIST

## 2020-12-01 PROCEDURE — 97112 NEUROMUSCULAR REEDUCATION: CPT | Mod: GP | Performed by: PHYSICAL THERAPIST

## 2020-12-08 ENCOUNTER — THERAPY VISIT (OUTPATIENT)
Dept: PHYSICAL THERAPY | Facility: CLINIC | Age: 50
End: 2020-12-08
Payer: OTHER MISCELLANEOUS

## 2020-12-08 DIAGNOSIS — M54.41 RIGHT-SIDED LOW BACK PAIN WITH RIGHT-SIDED SCIATICA, UNSPECIFIED CHRONICITY: ICD-10-CM

## 2020-12-08 DIAGNOSIS — M54.16 LUMBAR RADICULOPATHY: ICD-10-CM

## 2020-12-08 PROCEDURE — 97110 THERAPEUTIC EXERCISES: CPT | Mod: GP | Performed by: PHYSICAL THERAPIST

## 2020-12-08 PROCEDURE — 97112 NEUROMUSCULAR REEDUCATION: CPT | Mod: GP | Performed by: PHYSICAL THERAPIST

## 2020-12-14 ENCOUNTER — OFFICE VISIT (OUTPATIENT)
Dept: ORTHOPEDICS | Facility: CLINIC | Age: 50
End: 2020-12-14
Payer: OTHER MISCELLANEOUS

## 2020-12-14 VITALS
SYSTOLIC BLOOD PRESSURE: 120 MMHG | HEIGHT: 74 IN | BODY MASS INDEX: 40.43 KG/M2 | WEIGHT: 315 LBS | DIASTOLIC BLOOD PRESSURE: 62 MMHG

## 2020-12-14 DIAGNOSIS — M51.369 LUMBAR DEGENERATIVE DISC DISEASE: Primary | ICD-10-CM

## 2020-12-14 PROCEDURE — 99213 OFFICE O/P EST LOW 20 MIN: CPT | Performed by: FAMILY MEDICINE

## 2020-12-14 ASSESSMENT — MIFFLIN-ST. JEOR: SCORE: 2408.48

## 2020-12-14 NOTE — PROGRESS NOTES
ASSESSMENT & PLAN  Patient Instructions     1. Lumbar degenerative disc disease      -Patient is following up for low back pain due to degenerative disc disease as well as right lower extremity numbness and tingling  -Patient has had minimal improvement with continued physical therapy and home exercise program  -Patient will move forward with bilateral lower extremity EMG and nerve conduction studies.  An order was placed today  -Patient will continue to work light duty for the next 6 weeks  -Patient will follow up in 1 to 2 weeks after his EMG test via phone visit to review results and discuss next of treatment options.  He may review his results with myself or Dr. Workman depending on when his test is complete  -Call direct clinic number [[phone]] at any time with questions or concerns.    Albert Yeo MD [facility] Orthopedics and Sports Medicine  [facility]          -----    SUBJECTIVE:  Gonzales Melo is a 50 year old male who is seen in follow-up for low back pain due to degenerative disc disease.They were last seen 11/17/2020.     Since their last visit reports 0% - (About the same as last time). States still has good flexion and extension, but will still have pain on the lateral aspects of the back. Is working with Rin and doing home exercises, had some increase in numbness in his right thigh for about 2 days. Is able to stand for about 10-15 minutes before he will get shooting pain down his right leg, and able to walk about 1.2 miles on the treadmill before starting to have pain.  They indicate that their current pain level is 3/10 with movement, 0/10 with sitting straight. . They have tried home exercises and physical therapy (3 visits).      The patient is seen by themselves.    Patient's past medical, surgical, social, and family histories were reviewed today and no changes are noted.    REVIEW OF SYSTEMS:  Constitutional: NEGATIVE for fever, chills, change in weight  Skin:  "NEGATIVE for worrisome rashes, moles or lesions  GI/: NEGATIVE for bowel or bladder changes  Neuro: NEGATIVE for weakness, dizziness or paresthesias    OBJECTIVE:  /62   Ht 1.88 m (6' 2\")   Wt 147.9 kg (326 lb)   BMI 41.86 kg/m     General: healthy, alert and in no distress  HEENT: no scleral icterus or conjunctival erythema  Skin: no suspicious lesions or rash. No jaundice.  CV: regular rhythm by palpation, no pedal edema  Resp: normal respiratory effort without conversational dyspnea   Psych: normal mood and affect  Gait: normal steady gait with appropriate coordination and balance  Neuro: Decreased sensation to light touch over the right proximal lower extremity to the level of the knee  MSK:  THORACIC/LUMBAR SPINE  Inspection:    No gross deformity/asymmetry  Palpation:    Mildly tender about the lumbar facet joints, very mildly tender left and right SI joint - improved from previous. Very mildly tender at right sciatic notch - improved from previous. Otherwise remainder of landmarks are nontender.  Range of Motion:     Lumbar flexion limited by pain    Lumbar extension limited by pain  Strength:    quadriceps 5/5, hamstrings 5/5, gastrocsoleus 5/5, tibialis anterior 5/5  Special Tests:    Positive: slump test (right) - increased axial pain but does not cause radiating pain    Independent visualization of the below image:    Patient's conditions were thoroughly discussed during today's visit with greater than 50% of the visit spent counseling the patient with total time spent face-to-face with the patient being 18 minutes.    Albert Yeo MD, [facility] Sports and Orthopedic Care        "

## 2020-12-14 NOTE — PATIENT INSTRUCTIONS
1. Lumbar degenerative disc disease      -Patient is following up for low back pain due to degenerative disc disease as well as right lower extremity numbness and tingling  -Patient has had minimal improvement with continued physical therapy and home exercise program  -Patient will move forward with bilateral lower extremity EMG and nerve conduction studies.  An order was placed today  -Patient will continue to work light duty for the next 6 weeks  -Patient will follow up in 1 to 2 weeks after his EMG test via phone visit to review results and discuss next of treatment options.  He may review his results with myself or Dr. Workman depending on when his test is complete  -Call direct clinic number [[phone]] at any time with questions or concerns.    Albert Yeo MD [facility]  [facility] [place]

## 2020-12-14 NOTE — LETTER
12/14/2020         RE: Gonzales Melo  [address]  [place] [address]        Dear Colleague,    Thank you for referring your patient, Gonzales Melo, to the [facility] SPORTS MEDICINE CLIN[facility] [place]. Please see a copy of my visit note below.    ASSESSMENT & PLAN  Patient Instructions     1. Lumbar degenerative disc disease      -Patient is following up for low back pain due to degenerative disc disease as well as right lower extremity numbness and tingling  -Patient has had minimal improvement with continued physical therapy and home exercise program  -Patient will move forward with bilateral lower extremity EMG and nerve conduction studies.  An order was placed today  -Patient will continue to work light duty for the next 6 weeks  -Patient will follow up in 1 to 2 weeks after his EMG test via phone visit to review results and discuss next of treatment options.  He may review his results with myself or Dr. Workman depending on when his test is complete  -Call direct clinic number [[phone]] at any time with questions or concerns.    Albert Yeo MD [facility] Orthopedics and Sports Medicine  [facility] [place]          -----    SUBJECTIVE:  Gonzales Melo is a 50 year old male who is seen in follow-up for low back pain due to degenerative disc disease.They were last seen 11/17/2020.     Since their last visit reports 0% - (About the same as last time). States still has good flexion and extension, but will still have pain on the lateral aspects of the back. Is working with Rin and doing home exercises, had some increase in numbness in his right thigh for about 2 days. Is able to stand for about 10-15 minutes before he will get shooting pain down his right leg, and able to walk about 1.2 miles on the treadmill before starting to have pain.  They indicate that their current pain level is 3/10 with movement, 0/10 with sitting straight. . They have tried home exercises  "and physical therapy (3 visits).      The patient is seen by themselves.    Patient's past medical, surgical, social, and family histories were reviewed today and no changes are noted.    REVIEW OF SYSTEMS:  Constitutional: NEGATIVE for fever, chills, change in weight  Skin: NEGATIVE for worrisome rashes, moles or lesions  GI/: NEGATIVE for bowel or bladder changes  Neuro: NEGATIVE for weakness, dizziness or paresthesias    OBJECTIVE:  /62   Ht 1.88 m (6' 2\")   Wt 147.9 kg (326 lb)   BMI 41.86 kg/m     General: healthy, alert and in no distress  HEENT: no scleral icterus or conjunctival erythema  Skin: no suspicious lesions or rash. No jaundice.  CV: regular rhythm by palpation, no pedal edema  Resp: normal respiratory effort without conversational dyspnea   Psych: normal mood and affect  Gait: normal steady gait with appropriate coordination and balance  Neuro: Decreased sensation to light touch over the right proximal lower extremity to the level of the knee  MSK:  THORACIC/LUMBAR SPINE  Inspection:    No gross deformity/asymmetry  Palpation:    Mildly tender about the lumbar facet joints, very mildly tender left and right SI joint - improved from previous. Very mildly tender at right sciatic notch - improved from previous. Otherwise remainder of landmarks are nontender.  Range of Motion:     Lumbar flexion limited by pain    Lumbar extension limited by pain  Strength:    quadriceps 5/5, hamstrings 5/5, gastrocsoleus 5/5, tibialis anterior 5/5  Special Tests:    Positive: slump test (right) - increased axial pain but does not cause radiating pain    Independent visualization of the below image:    Patient's conditions were thoroughly discussed during today's visit with greater than 50% of the visit spent counseling the patient with total time spent face-to-face with the patient being 18 minutes.    Albert Yeo MD, [facility] Sports and Orthopedic Care            Again, thank you for allowing me to " participate in the care of your patient.        Sincerely,        Albert Yeo, MD

## 2020-12-14 NOTE — LETTER
December 14, 2020      Gonzales Melo  [address] [address]        To Whom It May Concern:    Gonzales Melo was seen in our clinic. He may return to work with the following: limited to light duty - lifting no greater than 20 pounds until 1/25/21.      Sincerely,        Albert Yeo, MD

## 2020-12-15 ENCOUNTER — THERAPY VISIT (OUTPATIENT)
Dept: PHYSICAL THERAPY | Facility: CLINIC | Age: 50
End: 2020-12-15
Payer: OTHER MISCELLANEOUS

## 2020-12-15 DIAGNOSIS — M54.41 RIGHT-SIDED LOW BACK PAIN WITH RIGHT-SIDED SCIATICA, UNSPECIFIED CHRONICITY: ICD-10-CM

## 2020-12-15 DIAGNOSIS — M54.16 LUMBAR RADICULOPATHY: ICD-10-CM

## 2020-12-15 PROCEDURE — 97110 THERAPEUTIC EXERCISES: CPT | Mod: GP | Performed by: PHYSICAL THERAPIST

## 2020-12-15 PROCEDURE — 97112 NEUROMUSCULAR REEDUCATION: CPT | Mod: GP | Performed by: PHYSICAL THERAPIST

## 2020-12-22 ENCOUNTER — MYC MEDICAL ADVICE (OUTPATIENT)
Dept: FAMILY MEDICINE | Facility: CLINIC | Age: 50
End: 2020-12-22

## 2020-12-22 ENCOUNTER — THERAPY VISIT (OUTPATIENT)
Dept: PHYSICAL THERAPY | Facility: CLINIC | Age: 50
End: 2020-12-22
Payer: OTHER MISCELLANEOUS

## 2020-12-22 DIAGNOSIS — M54.41 RIGHT-SIDED LOW BACK PAIN WITH RIGHT-SIDED SCIATICA, UNSPECIFIED CHRONICITY: ICD-10-CM

## 2020-12-22 DIAGNOSIS — M54.16 LUMBAR RADICULOPATHY: ICD-10-CM

## 2020-12-22 PROCEDURE — 97110 THERAPEUTIC EXERCISES: CPT | Mod: GP | Performed by: PHYSICAL THERAPIST

## 2020-12-22 PROCEDURE — 97112 NEUROMUSCULAR REEDUCATION: CPT | Mod: GP | Performed by: PHYSICAL THERAPIST

## 2020-12-23 ENCOUNTER — MYC MEDICAL ADVICE (OUTPATIENT)
Dept: ORTHOPEDICS | Facility: CLINIC | Age: 50
End: 2020-12-23

## 2020-12-23 NOTE — TELEPHONE ENCOUNTER
Patient calls asking that EMG order be sent to work comp for approval. He would like order faxed to attn: Nay Sethi at [place] :[phone]  Claim#[id number].   Informed order will be faxed.     Per chart review, patient also sent [employer] message today asking for the same thing.     Order faxed to above and confirmed it went through via rightfax.       TAMELA Torres RN

## 2020-12-29 ENCOUNTER — THERAPY VISIT (OUTPATIENT)
Dept: PHYSICAL THERAPY | Facility: CLINIC | Age: 50
End: 2020-12-29
Payer: OTHER MISCELLANEOUS

## 2020-12-29 ENCOUNTER — TELEPHONE (OUTPATIENT)
Dept: FAMILY MEDICINE | Facility: CLINIC | Age: 50
End: 2020-12-29

## 2020-12-29 DIAGNOSIS — M54.16 LUMBAR RADICULOPATHY: ICD-10-CM

## 2020-12-29 DIAGNOSIS — M54.41 RIGHT-SIDED LOW BACK PAIN WITH RIGHT-SIDED SCIATICA, UNSPECIFIED CHRONICITY: ICD-10-CM

## 2020-12-29 PROCEDURE — 97112 NEUROMUSCULAR REEDUCATION: CPT | Mod: GP | Performed by: PHYSICAL THERAPIST

## 2020-12-29 PROCEDURE — 97110 THERAPEUTIC EXERCISES: CPT | Mod: GP | Performed by: PHYSICAL THERAPIST

## 2020-12-29 NOTE — TELEPHONE ENCOUNTER
Patient here for SANDRA appointment and left message with  asking us to call his work comp rep: Mag Jossie. She is needing more information regarding the EMG ordered by Dr. Yeo. He asks that we call her at: [phone].     Left voicemail for Mag at the number above ([employer]) asking for a return call asking what further information is needed. Triage number provided. Voicemail did states she was out of the office today.     TAMELA Torres RN

## 2020-12-29 NOTE — TELEPHONE ENCOUNTER
[employer], returns calls. She states they have approved the EMG for work comp and patient may move forward. She faxed the approval to [facility] but wasn't sure who to notify. Informed we will note in chart and patient may move forward with testing.     Left voicemail asking patient to return call and sent [employer] message.     TAMELA Torres RN

## 2021-01-04 NOTE — TELEPHONE ENCOUNTER
LVM with patient to check his my chart messages or to call our nurse triage line back.    Was just wanting to touch base to see if patient had been able to schedule his EMG test.

## 2021-01-05 ENCOUNTER — THERAPY VISIT (OUTPATIENT)
Dept: PHYSICAL THERAPY | Facility: CLINIC | Age: 51
End: 2021-01-05
Payer: OTHER MISCELLANEOUS

## 2021-01-05 DIAGNOSIS — M54.41 RIGHT-SIDED LOW BACK PAIN WITH RIGHT-SIDED SCIATICA, UNSPECIFIED CHRONICITY: ICD-10-CM

## 2021-01-05 DIAGNOSIS — M54.16 LUMBAR RADICULOPATHY: ICD-10-CM

## 2021-01-05 PROCEDURE — 97112 NEUROMUSCULAR REEDUCATION: CPT | Mod: GP | Performed by: PHYSICAL THERAPIST

## 2021-01-05 PROCEDURE — 97110 THERAPEUTIC EXERCISES: CPT | Mod: GP | Performed by: PHYSICAL THERAPIST

## 2021-01-12 ENCOUNTER — THERAPY VISIT (OUTPATIENT)
Dept: PHYSICAL THERAPY | Facility: CLINIC | Age: 51
End: 2021-01-12
Payer: OTHER MISCELLANEOUS

## 2021-01-12 DIAGNOSIS — M54.41 RIGHT-SIDED LOW BACK PAIN WITH RIGHT-SIDED SCIATICA, UNSPECIFIED CHRONICITY: ICD-10-CM

## 2021-01-12 DIAGNOSIS — M54.16 LUMBAR RADICULOPATHY: ICD-10-CM

## 2021-01-12 PROCEDURE — 97112 NEUROMUSCULAR REEDUCATION: CPT | Mod: GP | Performed by: PHYSICAL THERAPIST

## 2021-01-12 PROCEDURE — 97110 THERAPEUTIC EXERCISES: CPT | Mod: GP | Performed by: PHYSICAL THERAPIST

## 2021-01-12 NOTE — TELEPHONE ENCOUNTER
MyChart encounter 12/29/20 states approval was received for EMG.  Patient had plans to schedule.  Closing this encounter.    Sophie Menchaca, ATC

## 2021-01-15 ENCOUNTER — HEALTH MAINTENANCE LETTER (OUTPATIENT)
Age: 51
End: 2021-01-15

## 2021-01-19 ENCOUNTER — TRANSFERRED RECORDS (OUTPATIENT)
Dept: HEALTH INFORMATION MANAGEMENT | Facility: CLINIC | Age: 51
End: 2021-01-19

## 2021-01-27 ENCOUNTER — HOSPITAL ENCOUNTER (OUTPATIENT)
Dept: LAB | Facility: CLINIC | Age: 51
Discharge: HOME | End: 2021-01-27
Attending: INTERNAL MEDICINE | Admitting: INTERNAL MEDICINE
Payer: COMMERCIAL

## 2021-01-27 ENCOUNTER — HOSPITAL ENCOUNTER (OUTPATIENT)
Dept: LAB | Facility: CLINIC | Age: 51
End: 2021-01-26
Attending: INTERNAL MEDICINE
Payer: COMMERCIAL

## 2021-01-27 DIAGNOSIS — R76.8 RHEUMATOID FACTOR POSITIVE WITH CYCLIC CITRULLINATED PEPTIDE (CCP) ANTIBODY NEGATIVE: ICD-10-CM

## 2021-01-27 LAB
ALT SERPL W P-5'-P-CCNC: 30 U/L (ref 0–70)
AST SERPL W P-5'-P-CCNC: 12 U/L (ref 0–45)
CREAT SERPL-MCNC: 0.93 MG/DL (ref 0.66–1.25)
ERYTHROCYTE [DISTWIDTH] IN BLOOD BY AUTOMATED COUNT: 13.8 % (ref 10–15)
GFR SERPL CREATININE-BSD FRML MDRD: >90 ML/MIN/{1.73_M2}
HCT VFR BLD AUTO: 44.2 % (ref 40–53)
HGB BLD-MCNC: 14.3 G/DL (ref 13.3–17.7)
MCH RBC QN AUTO: 30.9 PG (ref 26.5–33)
MCHC RBC AUTO-ENTMCNC: 32.4 G/DL (ref 31.5–36.5)
MCV RBC AUTO: 96 FL (ref 78–100)
PLATELET # BLD AUTO: 335 10E9/L (ref 150–450)
RBC # BLD AUTO: 4.63 10E12/L (ref 4.4–5.9)
WBC # BLD AUTO: 7.8 10E9/L (ref 4–11)

## 2021-01-27 PROCEDURE — 36415 COLL VENOUS BLD VENIPUNCTURE: CPT | Performed by: INTERNAL MEDICINE

## 2021-01-27 PROCEDURE — 84450 TRANSFERASE (AST) (SGOT): CPT | Performed by: INTERNAL MEDICINE

## 2021-01-27 PROCEDURE — 82565 ASSAY OF CREATININE: CPT | Performed by: INTERNAL MEDICINE

## 2021-01-27 PROCEDURE — 84460 ALANINE AMINO (ALT) (SGPT): CPT | Performed by: INTERNAL MEDICINE

## 2021-01-27 PROCEDURE — 85027 COMPLETE CBC AUTOMATED: CPT | Performed by: INTERNAL MEDICINE

## 2021-01-29 ENCOUNTER — VIRTUAL VISIT (OUTPATIENT)
Dept: ORTHOPEDICS | Facility: CLINIC | Age: 51
End: 2021-01-29
Payer: OTHER MISCELLANEOUS

## 2021-01-29 VITALS
HEIGHT: 74 IN | SYSTOLIC BLOOD PRESSURE: 106 MMHG | BODY MASS INDEX: 40.04 KG/M2 | DIASTOLIC BLOOD PRESSURE: 66 MMHG | WEIGHT: 312 LBS

## 2021-01-29 DIAGNOSIS — R29.898 WEAKNESS OF RIGHT LOWER EXTREMITY: Primary | ICD-10-CM

## 2021-01-29 DIAGNOSIS — M51.369 LUMBAR DEGENERATIVE DISC DISEASE: ICD-10-CM

## 2021-01-29 DIAGNOSIS — M51.16 LUMBAR DISC HERNIATION WITH RADICULOPATHY: ICD-10-CM

## 2021-01-29 PROCEDURE — 99214 OFFICE O/P EST MOD 30 MIN: CPT | Mod: 95 | Performed by: FAMILY MEDICINE

## 2021-01-29 ASSESSMENT — MIFFLIN-ST. JEOR: SCORE: 2344.97

## 2021-01-29 NOTE — PROGRESS NOTES
Sylvain is a 50 year old who is being evaluated via a billable telephone visit.      What phone number would you like to be contacted at? [phone]  How would you like to obtain your AVS? Madisyn  Phone call duration: 24 minutes    ASSESSMENT & PLAN  Patient Instructions     1. Weakness of right lower extremity    2. Lumbar degenerative disc disease    3. Lumbar disc herniation with radiculopathy      -Patient is following up for low back pain due to aggravation of lumbar degenerative disc disease as well as ongoing right lower extremity weakness.  -EMG and nerve conduction studies showed potentially abnormal study with subtle changes that may be a sign of early peripheral neuropathy or mild polyradiculopathy.  I recommend that patient follow-up with neurology for further evaluation and treatment.  An order was placed today for neurology consult.  -Patient will continue with his physical therapy until discharge.  -Patient may return to work without restriction.  -Patient will follow up if pain returns  -Call direct clinic number [[phone]] at any time with questions or concerns.    Albert Yeo MD [facility] Orthopedics and Sports Medicine  [facility]          -----    SUBJECTIVE:  Gonzalse Melo is a 50 year old male who is seen in follow-up for  1. Lumbar degenerative disc disease      They were last seen 12/14/2020.     Since their last visit reports 0% - (About the same as last time), he notes that his radicular symptoms have not worsened, he continues to have leg paresthesias. He does note a new shooting pain when seated that resolves with repositioning. . They indicate that their current pain level is 3/10 in the back, numbness in the leg. They have tried physical therapy (8 visits) since his last office visit, Tylenol, Ibuprofen. He is here today to discuss his EMG findings, EMG was obtained at [facility].     The patient is seen by themselves.    Patient's past  "medical, surgical, social, and family histories were reviewed today and no changes are noted.    REVIEW OF SYSTEMS:  Constitutional: NEGATIVE for fever, chills, change in weight  Skin: NEGATIVE for worrisome rashes, moles or lesions  GI/: NEGATIVE for bowel or bladder changes  Neuro: NEGATIVE for weakness, dizziness or paresthesias    OBJECTIVE:  /66   Ht 1.88 m (6' 2\")   Wt 141.5 kg (312 lb)   BMI 40.06 kg/m          Independent visualization of the below image:  EMG and nerve conduction study performed on 1/19/2021 at [facility] shows a potentially abnormal study.  EMG needle examination shows some subtle changes that could be part of an early peripheral neuropathy or mild polyradiculopathy, but the lack of findings on the nerve conduction portion of the test makes is difficult to say definitively, and clinical correlation will be necessary.         Patient's conditions were thoroughly discussed during today's visit with greater than 50% of the visit spent counseling the patient with total time spent face-to-face with the patient being 24 minutes.    Albert Yeo MD, [facility] Sports and Orthopedic Care        "

## 2021-01-29 NOTE — LETTER
January 29, 2021      Gonzales Melo  [address]] [address]        To Whom It May Concern:    Gonzales Melo was seen in our clinic. He may return to work without restrictions.      Sincerely,        Albert Yeo, MD

## 2021-01-29 NOTE — LETTER
1/29/2021         RE: Gonzales Melo  [address]  [place] [address]        Dear Colleague,    Thank you for referring your patient, Gonzales Melo, to the [facility] SPORTS MEDICINE CLINIC [place]. Please see a copy of my visit note below.    Sylvain is a 50 year old who is being evaluated via a billable telephone visit.      What phone number would you like to be contacted at? [phone]  How would you like to obtain your AVS? MyChart  Phone call duration: 24 minutes    ASSESSMENT & PLAN  Patient Instructions     1. Weakness of right lower extremity    2. Lumbar degenerative disc disease    3. Lumbar disc herniation with radiculopathy      -Patient is following up for low back pain due to aggravation of lumbar degenerative disc disease as well as ongoing right lower extremity weakness.  -EMG and nerve conduction studies showed potentially abnormal study with subtle changes that may be a sign of early peripheral neuropathy or mild polyradiculopathy.  I recommend that patient follow-up with neurology for further evaluation and treatment.  An order was placed today for neurology consult.  -Patient will continue with his physical therapy until discharge.  -Patient may return to work without restriction.  -Patient will follow up if pain returns  -Call direct clinic number [[phone]] at any time with questions or concerns.    Albert Yeo MD [facility] Orthopedics and Sports Medicine  [facility] [place]          -----    SUBJECTIVE:  Gonzales Melo is a 50 year old male who is seen in follow-up for  1. Lumbar degenerative disc disease      They were last seen 12/14/2020.     Since their last visit reports 0% - (About the same as last time), he notes that his radicular symptoms have not worsened, he continues to have leg paresthesias. He does note a new shooting pain when seated that resolves with repositioning. . They indicate that their current pain level is 3/10 in the  "back, numbness in the leg. They have tried physical therapy (8 visits) since his last office visit, Tylenol, Ibuprofen. He is here today to discuss his EMG findings, EMG was obtained at [facility].     The patient is seen by themselves.    Patient's past medical, surgical, social, and family histories were reviewed today and no changes are noted.    REVIEW OF SYSTEMS:  Constitutional: NEGATIVE for fever, chills, change in weight  Skin: NEGATIVE for worrisome rashes, moles or lesions  GI/: NEGATIVE for bowel or bladder changes  Neuro: NEGATIVE for weakness, dizziness or paresthesias    OBJECTIVE:  /66   Ht 1.88 m (6' 2\")   Wt 141.5 kg (312 lb)   BMI 40.06 kg/m          Independent visualization of the below image:  EMG and nerve conduction study performed on 1/19/2021 at [facility] shows a potentially abnormal study.  EMG needle examination shows some subtle changes that could be part of an early peripheral neuropathy or mild polyradiculopathy, but the lack of findings on the nerve conduction portion of the test makes is difficult to say definitively, and clinical correlation will be necessary.         Patient's conditions were thoroughly discussed during today's visit with greater than 50% of the visit spent counseling the patient with total time spent face-to-face with the patient being 24 minutes.    Albert Yeo MD, [facility] Sports and Orthopedic Care            Again, thank you for allowing me to participate in the care of your patient.        Sincerely,        Albert Yeo, MD    "

## 2021-01-29 NOTE — PATIENT INSTRUCTIONS
1. Weakness of right lower extremity    2. Lumbar degenerative disc disease    3. Lumbar disc herniation with radiculopathy      -Patient is following up for low back pain due to aggravation of lumbar degenerative disc disease as well as ongoing right lower extremity weakness.  -EMG and nerve conduction studies showed potentially abnormal study with subtle changes that may be a sign of early peripheral neuropathy or mild polyradiculopathy.  I recommend that patient follow-up with neurology for further evaluation and treatment.  An order was placed today for neurology consult.  -Patient will continue with his physical therapy until discharge.  -Patient may return to work without restriction.  -Patient will follow up if pain returns  -Call direct clinic number [[phone]] at any time with questions or concerns.    Albert Yeo MD [facility] Orthopedics and Sports Medicine  [facility] [place]

## 2021-02-11 ENCOUNTER — TRANSFERRED RECORDS (OUTPATIENT)
Dept: HEALTH INFORMATION MANAGEMENT | Facility: CLINIC | Age: 51
End: 2021-02-11

## 2021-02-19 PROBLEM — M54.41 RIGHT-SIDED LOW BACK PAIN WITH RIGHT-SIDED SCIATICA, UNSPECIFIED CHRONICITY: Status: RESOLVED | Noted: 2020-10-13 | Resolved: 2021-02-19

## 2021-02-19 PROBLEM — M54.16 LUMBAR RADICULOPATHY: Status: RESOLVED | Noted: 2020-10-13 | Resolved: 2021-02-19

## 2021-02-19 NOTE — PROGRESS NOTES
DISCHARGE REPORT    Progress reporting period is from 10-7-20 to 1-5-21.       SUBJECTIVE  Subjective changes noted by patient:  Returning to work without restrictions.  Still has R thigh numbness which is being followed up by Dr. Yeo and neurology  Current pain level is 0/10  .     Previous pain level was  4/10 Initial Pain level: 4/10.   Changes in function:  Yes (See Goal flowsheet attached for changes in current functional level)  Adverse reaction to treatment or activity: None    OBJECTIVE  Changes noted in objective findings:  The objective findings below are from DOS 1-5-21:    Lx ROM is wnl; core strength 4/5. MILY=20, Lia STarT=LOW        ASSESSMENT/PLAN  Updated problem list and treatment plan: Diagnosis 1:  R lumbar radiculopathy  Pain -  self management, education, directional preference exercise and home program  Decreased ROM/flexibility - manual therapy and therapeutic exercise  Decreased joint mobility - manual therapy and therapeutic exercise  Decreased strength - therapeutic exercise and therapeutic activities  Decreased function - therapeutic activities  Impaired posture - neuro re-education  STG/LTGs have been met or progress has been made towards goals:  Yes (See Goal flow sheet completed today.)  Assessment of Progress: The patient has met all of their long term goals.  Self Management Plans:  Patient is independent in a home treatment program.  Patient is independent in self management of symptoms.  I have re-evaluated this patient and find that the nature, scope, duration and intensity of the therapy is appropriate for the medical condition of the patient.  Gonzales continues to require the following intervention to meet STG and LTG's:  PT intervention is no longer required to meet STG/LTG.    Recommendations:  This patient is ready to be discharged from therapy and continue their home treatment program.    Please refer to the daily flowsheet for treatment today, total treatment time and time  spent performing 1:1 timed codes.

## 2021-05-07 ENCOUNTER — HOSPITAL ENCOUNTER (OUTPATIENT)
Dept: LAB | Facility: CLINIC | Age: 51
Discharge: HOME | End: 2021-05-07
Attending: INTERNAL MEDICINE | Admitting: INTERNAL MEDICINE
Payer: COMMERCIAL

## 2021-05-07 DIAGNOSIS — R76.8 RHEUMATOID FACTOR POSITIVE WITH CYCLIC CITRULLINATED PEPTIDE (CCP) ANTIBODY NEGATIVE: ICD-10-CM

## 2021-05-07 LAB
ALT SERPL W P-5'-P-CCNC: 38 U/L (ref 0–70)
AST SERPL W P-5'-P-CCNC: 20 U/L (ref 0–45)
CREAT SERPL-MCNC: 0.92 MG/DL (ref 0.66–1.25)
ERYTHROCYTE [DISTWIDTH] IN BLOOD BY AUTOMATED COUNT: 14 % (ref 10–15)
GFR SERPL CREATININE-BSD FRML MDRD: >90 ML/MIN/{1.73_M2}
HCT VFR BLD AUTO: 43.1 % (ref 40–53)
HGB BLD-MCNC: 14.4 G/DL (ref 13.3–17.7)
MCH RBC QN AUTO: 30.7 PG (ref 26.5–33)
MCHC RBC AUTO-ENTMCNC: 33.4 G/DL (ref 31.5–36.5)
MCV RBC AUTO: 92 FL (ref 78–100)
PLATELET # BLD AUTO: 327 10E9/L (ref 150–450)
RBC # BLD AUTO: 4.69 10E12/L (ref 4.4–5.9)
WBC # BLD AUTO: 8.4 10E9/L (ref 4–11)

## 2021-05-07 PROCEDURE — 84450 TRANSFERASE (AST) (SGOT): CPT | Performed by: INTERNAL MEDICINE

## 2021-05-07 PROCEDURE — 84460 ALANINE AMINO (ALT) (SGPT): CPT | Performed by: INTERNAL MEDICINE

## 2021-05-07 PROCEDURE — 82565 ASSAY OF CREATININE: CPT | Performed by: INTERNAL MEDICINE

## 2021-05-07 PROCEDURE — 85027 COMPLETE CBC AUTOMATED: CPT | Performed by: INTERNAL MEDICINE

## 2021-05-07 PROCEDURE — 36415 COLL VENOUS BLD VENIPUNCTURE: CPT | Performed by: INTERNAL MEDICINE

## 2021-05-20 ENCOUNTER — TELEPHONE (OUTPATIENT)
Dept: ORTHOPEDICS | Facility: CLINIC | Age: 51
End: 2021-05-20

## 2021-07-28 ENCOUNTER — LAB (OUTPATIENT)
Dept: LAB | Facility: CLINIC | Age: 51
End: 2021-07-28
Payer: COMMERCIAL

## 2021-07-28 ENCOUNTER — MYC MEDICAL ADVICE (OUTPATIENT)
Dept: RHEUMATOLOGY | Facility: CLINIC | Age: 51
End: 2021-07-28

## 2021-07-28 DIAGNOSIS — R76.8 RHEUMATOID FACTOR POSITIVE WITH CYCLIC CITRULLINATED PEPTIDE (CCP) ANTIBODY NEGATIVE: ICD-10-CM

## 2021-07-28 LAB
ALT SERPL W P-5'-P-CCNC: 47 U/L (ref 0–70)
AST SERPL W P-5'-P-CCNC: 15 U/L (ref 0–45)
CREAT SERPL-MCNC: 0.91 MG/DL (ref 0.66–1.25)
ERYTHROCYTE [DISTWIDTH] IN BLOOD BY AUTOMATED COUNT: 14.1 % (ref 10–15)
GFR SERPL CREATININE-BSD FRML MDRD: >90 ML/MIN/1.73M2
HCT VFR BLD AUTO: 46.6 % (ref 40–53)
HGB BLD-MCNC: 15.1 G/DL (ref 13.3–17.7)
MCH RBC QN AUTO: 30.5 PG (ref 26.5–33)
MCHC RBC AUTO-ENTMCNC: 32.4 G/DL (ref 31.5–36.5)
MCV RBC AUTO: 94 FL (ref 78–100)
PLATELET # BLD AUTO: 319 10E3/UL (ref 150–450)
RBC # BLD AUTO: 4.95 10E6/UL (ref 4.4–5.9)
WBC # BLD AUTO: 9.3 10E3/UL (ref 4–11)

## 2021-07-28 PROCEDURE — 82565 ASSAY OF CREATININE: CPT

## 2021-07-28 PROCEDURE — 84450 TRANSFERASE (AST) (SGOT): CPT

## 2021-07-28 PROCEDURE — 84460 ALANINE AMINO (ALT) (SGPT): CPT

## 2021-07-28 PROCEDURE — 85027 COMPLETE CBC AUTOMATED: CPT

## 2021-07-28 PROCEDURE — 36415 COLL VENOUS BLD VENIPUNCTURE: CPT

## 2021-07-29 NOTE — TELEPHONE ENCOUNTER
methotrexate 2.5 MG tablet      Last Written Prescription Date:  7-31-20  Last Fill Quantity: 96,   # refills: 3  Last Office Visit: 7-31-20 ( RTC 4 M)  Future Office visit:  none    CBC RESULTS: Recent Labs   Lab Test 07/28/21  1416   WBC 9.3   RBC 4.95   HGB 15.1   HCT 46.6   MCV 94   MCH 30.5   MCHC 32.4   RDW 14.1          Creatinine   Date Value Ref Range Status   07/28/2021 0.91 0.66 - 1.25 mg/dL Final   05/07/2021 0.92 0.66 - 1.25 mg/dL Final   ]    Liver Function Studies -   Recent Labs   Lab Test 07/28/21  1416 12/23/19  0850   PROTTOTAL  --  7.3   ALBUMIN  --  3.2*   BILITOTAL  --  0.2   ALKPHOS  --  98   AST 15 12   ALT 47 28       Routing refill request to provider for review/approval because:  Per protocol    Scheduling has been notified to contact the pt for appointment.

## 2021-09-30 ENCOUNTER — LAB (OUTPATIENT)
Dept: LAB | Facility: CLINIC | Age: 51
End: 2021-09-30
Attending: INTERNAL MEDICINE
Payer: COMMERCIAL

## 2021-09-30 DIAGNOSIS — R76.8 RHEUMATOID FACTOR POSITIVE WITH CYCLIC CITRULLINATED PEPTIDE (CCP) ANTIBODY NEGATIVE: ICD-10-CM

## 2021-09-30 LAB
ALT SERPL W P-5'-P-CCNC: 59 U/L (ref 0–70)
AST SERPL W P-5'-P-CCNC: 23 U/L (ref 0–45)
CREAT SERPL-MCNC: 0.96 MG/DL (ref 0.66–1.25)
ERYTHROCYTE [DISTWIDTH] IN BLOOD BY AUTOMATED COUNT: 13.8 % (ref 10–15)
GFR SERPL CREATININE-BSD FRML MDRD: >90 ML/MIN/1.73M2
HCT VFR BLD AUTO: 43.7 % (ref 40–53)
HGB BLD-MCNC: 14.6 G/DL (ref 13.3–17.7)
MCH RBC QN AUTO: 31 PG (ref 26.5–33)
MCHC RBC AUTO-ENTMCNC: 33.4 G/DL (ref 31.5–36.5)
MCV RBC AUTO: 93 FL (ref 78–100)
PLATELET # BLD AUTO: 345 10E3/UL (ref 150–450)
RBC # BLD AUTO: 4.71 10E6/UL (ref 4.4–5.9)
WBC # BLD AUTO: 8.1 10E3/UL (ref 4–11)

## 2021-09-30 PROCEDURE — 36415 COLL VENOUS BLD VENIPUNCTURE: CPT

## 2021-09-30 PROCEDURE — 82565 ASSAY OF CREATININE: CPT

## 2021-09-30 PROCEDURE — 84460 ALANINE AMINO (ALT) (SGPT): CPT

## 2021-09-30 PROCEDURE — 84450 TRANSFERASE (AST) (SGOT): CPT

## 2021-09-30 PROCEDURE — 85027 COMPLETE CBC AUTOMATED: CPT

## 2021-10-23 DIAGNOSIS — R76.8 RHEUMATOID FACTOR POSITIVE WITH CYCLIC CITRULLINATED PEPTIDE (CCP) ANTIBODY NEGATIVE: ICD-10-CM

## 2021-10-24 ENCOUNTER — HEALTH MAINTENANCE LETTER (OUTPATIENT)
Age: 51
End: 2021-10-24

## 2021-10-25 NOTE — PROGRESS NOTES
Rheumatology Clinic Visit  Moisés Vaz M.D.     Gonzales Melo MRN# [id number]   YOB: 1970 Age: 51 year old     Date of Visit: 10/28/2021    Primary care provider: Kandy Duff          Assessment and Plan:   # Seropositive rheumatoid arthritis (RF+, ACPA+ in 2019):   Patient describes increased swelling and pain in hands, knuckles, for several months.  Physical exam shows tenderness at both elbows, multiple PIPs and multiple MTPs without palpable synovitis. Laboratory evaluation on September 30, 2021 revealed creatinine, transaminases, and CBC all normal    Although former protracted morning stiffness remains controlled, patient now has significant increase in chronic joint pain in target joints for rheumatoid arthritis.  I think that rheumatoid arthritis is active on maximal dose methotrexate monotherapy, and that augmented immunotherapy is indicated.  We discussed how maximal reduction in risk of disabling chronic inflammatory disease, as well as maximal reduction in symptoms often requires combination therapy in the treatment of rheumatoid arthritis.  We discussed the risks and benefits we discussed the following plan:    Plan:  1.  Continue methotrexate 25 mg orally once weekly. While on methotrexate:   -- Check labs every 3 months (AST/ALT, Albumin, CBC with platelets)   -- Limit alcohol intake to 2 drinks weekly; use folate 1 mg daily.  --Tylenol 500-1000 mg can be used as needed up to three times daily for nausea/headache associated with dosing.    2.  Start adalimumab 40 mg subcutaneously once every other week.  Expect benefit within 2 to 6 weeks.  3.  Utilize acetaminophen 1000 mg up to 3 times daily as needed for residual joint pain.  4.  Check hepatitis and tuberculosis serologies.    I recommend follow-up in 3 months time.    Moisés Vaz MD  Staff Rheumatologist, [facility]    I spent >50% of this 40 minute encounter in counseling and coordination of care regarding the  "patient's complex medical problem of incompletely controlled poor prognosis rheumatoid arthritis.            Active Problem List:     Patient Active Problem List    Diagnosis Date Noted     Pes planus of both feet 12/23/2019     Priority: Medium     Morbid obesity (H) 11/30/2018     Priority: Medium     Obesity 01/30/2014     Priority: Medium     Hypertriglyceridemia 01/30/2014     Priority: Medium     Plantar fasciitis 01/30/2014     Priority: Medium     CARDIOVASCULAR SCREENING; LDL GOAL LESS THAN 160 10/31/2010     Priority: Medium     Asthma, mild intermittent      Priority: Medium            History of Present Illness:   Gonzales Melo presents for follow up of rheumatoid arthritis.  I last evaluated the patient in 7-2020, and at that time, inflammatory arthritis was improved.  I recommended continuing 25 mg methotrexate per week.    Interval history 10-:    Overall he is well, working full-time.  He notes 1-2/10 pain in the fingers and knuckles, and fingers feel \"thick\". Pain is continuous, not really morning predominant. Sometimes wrists are involved. Feet sometimes stiff. Hands have been swelling more for the past couple months. No redness/warmth of joints; sensation involves phalanges between joints.   Former discomfort after immobility is less intense.    Uzses 25 mg methotrexate weekly. No AE.  No EtOH intake.    No fatiigue. Low energy.    Interval history 07-:  He is better.    He had a back injury at work on 7-13-20. He started a week of prednisone therapy. Since then, indeed, even since before he started taking the prednisone, he has noted marked reduction in bialteral sole of foot pain. Prednisone stopped over a week ago. Symptom intensity is down to 2 or 3.  Minimal morning pain.  And minimal pain with completing a full shift at work.  He has been taking increased methotrexate 10 tabs weekly for at least 8 weeks. No AE from methotrexate. Minimal morning stiffness. No swollen or " "tender joints.    He did see Podiatry; he has continued to wear orthotics, but he does not think they have been effective without the methotrexate.    Prior history:    Interval history, 4-28-20:    He reports doing \"ok\". He has not noted any improvement (< 5%) in his joints since starting methotrexate in 1-2020. He take 6 tabs once weekly, no difficulty. He still notes significant pain, after periods of immobility, in feet and ankles initially. He is able to walk off the pain by moving around, but it recurs as soon as he sits for awhile. Pain is on the soles and the sides of his feet; it also goes across the tops of the feet and in the base of his toes. No swelling. Mornings, he is stiff for ~ 10 minutes. 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 He is not using NSAIDs or OTC tylenol.    He recently went for a 3 mile walk; he had a tough time finishing the walk due to foot/ankle pain.     His knees give him pain, but more modest than in the feet. He attributes this pain to \"wear and tear\". UE joints are ok apart from stiffness in the fingers.    He has been to Podiatry, and has been given shoe inserts; he has had no help from the inserts, either.         Review of Systems:     Constitutional: negative  Skin: negative  Eyes: negative  Ears/Nose/Throat: negative  Respiratory: No shortness of breath, dyspnea on exertion, cough, or hemoptysis  Cardiovascular: negative  Gastrointestinal: negative  Genitourinary: negative  Musculoskeletal: hpi  Neurologic: negative  Psychiatric: negative  Hematologic/Lymphatic/Immunologic: negative  Endocrine: negative          Past Medical History:     Past Medical History:   Diagnosis Date     Arthritis     RA     Asthma, mild intermittent      Obesity 1/30/2014     Plantar fasciitis 1/30/2014     Past Surgical History:   Procedure Laterality Date     COLONOSCOPY  9/30/2015    Dr. Son [facility]     COLONOSCOPY N/A 9/30/2015    Procedure: COLONOSCOPY;  Surgeon: Adelaida" Marvin Bustamante MD;  Location:  GI     --recurrent asthma since the patient was in his mid 20s.  He has been treated with intermittent prednisone bursts 2-3 times per year.  He has never been intubated or hospitalized for asthma.  -Pyelonephritis as a teenager  - Sciatica with a right sided lumbar herniated disc.  --Seropositive rheumatoid arthritis: Recurrent symptoms in late 2019.  Started methotrexate with inadequate response at low-dose.  Had complete response with 25 mg methotrexate weekly in summer 2020.       Social History:     Social History     Occupational History     Not on file   Tobacco Use     Smoking status: Never Smoker     Smokeless tobacco: Never Used   Substance and Sexual Activity     Alcohol use: No     Drug use: No     Sexual activity: Yes     Partners: Female   Patient works as a perioperative nurse. He does not smoke.  He drinks no alcohol.  He notes that increased amounts of chicken consumption is associated with increased severity of joint pain.  He denies frequent consumption of crustaceans or organ meats, and does not use aspirin or significant amounts of caffeine.         Family History:     Family History   Problem Relation Age of Onset     Family History Negative Mother      Hypertension Maternal Grandmother      Rheumatoid Arthritis Maternal Grandmother      Heart Disease Brother      Maternal grandmother had rheumatoid arthritis and cerebrovascular accident.  Maternal grandfather had coronary artery disease with an MI at age 55.       Allergies:     Allergies   Allergen Reactions     Mucinex Other (See Comments) and Hives     lip swelling            Medications:     Current Outpatient Medications   Medication Sig Dispense Refill     adalimumab (HUMIRA *CF*) 40 MG/0.4ML prefilled syringe kit Inject 0.4 mLs (40 mg) Subcutaneous every 14 days 1 each 5     albuterol (PROAIR HFA/PROVENTIL HFA/VENTOLIN HFA) 108 (90 Base) MCG/ACT inhaler Inhale 1-2 puffs into the lungs every 4 hours as  "needed for shortness of breath / dyspnea or wheezing 18 g 1     fluticasone (FLONASE) 50 MCG/ACT spray Spray 1-2 sprays into both nostrils daily 1 Bottle 11     ipratropium - albuterol 0.5 mg/2.5 mg/3 mL (DUONEB) 0.5-2.5 (3) MG/3ML neb solution Take 1 vial (3 mLs) by nebulization every 6 hours as needed for shortness of breath / dyspnea 30 vial 1     methotrexate sodium 2.5 MG TABS Take 10 tablets (25 mg) by mouth every 7 days 120 tablet 4            Physical Exam:   Blood pressure 120/83, pulse 91, height 1.88 m (6' 2\"), weight (!) 153.5 kg (338 lb 4.8 oz), SpO2 99 %.  Wt Readings from Last 4 Encounters:   10/28/21 (!) 153.5 kg (338 lb 4.8 oz)   01/29/21 141.5 kg (312 lb)   12/14/20 147.9 kg (326 lb)   11/17/20 (!) 157.4 kg (347 lb)     Constitutional: well-developed, appearing stated age; cooperative  Eyes: nl EOM, PERRLA, vision, conjunctiva, sclera  ENT: nl external ears, nose, hearing, lips, teeth, gums, throat  No mucous membrane lesions, normal saliva pool  Neck: no mass or thyroid enlargement  MS: Tenderness at both elbows, right third PIP, left second PIP, multiple MTPs on both feet; fist formation is excellent.  Range of motion in wrists, elbows, and shoulders is normal.  Skin: no nail pitting, alopecia, rash, nodules or lesions  Neuro: nl cranial nerves, strength, sensation, DTRs.   Psych: nl judgement, orientation, memory, affect.         Data:     No results found for any visits on 10/28/21.    RHEUM RESULTS Latest Ref Rng & Units 9/16/2004 3/25/2005 8/2/2005   ALBUMIN 3.4 - 5.0 g/dL - - -   ALT 0 - 70 U/L - 32 58   AST 0 - 45 U/L - - -   CREATININE 0.66 - 1.25 mg/dL - - -   CRP 0.0 - 8.0 mg/L <0.02 - -   ELIDIA - <1.0  Interpretation:  Negative - -   GFR ESTIMATE, IF BLACK >60 mL/min/[1.73:m2] - - -   GFR ESTIMATE >60 mL/min/1.73m2 - - -   HEMATOCRIT 40.0 - 53.0 % 47.1 40.4 42.3   HEMOGLOBIN 13.3 - 17.7 g/dL 16.2 13.4 14.3   WBC 4.0 - 11.0 10e3/uL 5.9 6.0 7.8   RBC 4.40 - 5.90 10e6/uL 5.30 4.27(L) 4.45 "   RDW 10.0 - 15.0 % 13.9 12.0 12.3   MCHC 31.5 - 36.5 g/dL 34.4 33.1 33.8   MCV 78 - 100 fL 89 95 95   PLATELET COUNT 150 - 450 10e3/uL 259 243 252   RHEUMATOID FACTOR <12 IU/mL <20 - -   ESR 0 - 15 mm/h - - -       Rheumatoid Factor   Date Value Ref Range Status   12/23/2019 44 (H) <12 IU/mL Final   ,   Cyclic Citrullinated Peptide Antibody, IgG   Date Value Ref Range Status   01/15/2020 177 (H) <7 U/mL Final     Comment:     Positive     LONA Screen by EIA   Date Value Ref Range Status   09/16/2004 <1.0  Interpretation:  Negative  Final     RHEUM RESULTS Latest Ref Rng & Units 9/16/2004 3/25/2005 8/2/2005   ALBUMIN 3.4 - 5.0 g/dL - - -   ALT 0 - 70 U/L - 32 58   AST 0 - 45 U/L - - -   CREATININE 0.66 - 1.25 mg/dL - - -   CRP 0.0 - 8.0 mg/L <0.02 - -   ELIDIA - <1.0  Interpretation:  Negative - -   GFR ESTIMATE, IF BLACK >60 mL/min/[1.73:m2] - - -   GFR ESTIMATE >60 mL/min/1.73m2 - - -   HEMATOCRIT 40.0 - 53.0 % 47.1 40.4 42.3   HEMOGLOBIN 13.3 - 17.7 g/dL 16.2 13.4 14.3   WBC 4.0 - 11.0 10e3/uL 5.9 6.0 7.8   RBC 4.40 - 5.90 10e6/uL 5.30 4.27(L) 4.45   RDW 10.0 - 15.0 % 13.9 12.0 12.3   MCHC 31.5 - 36.5 g/dL 34.4 33.1 33.8   MCV 78 - 100 fL 89 95 95   PLATELET COUNT 150 - 450 10e3/uL 259 243 252   RHEUMATOID FACTOR <12 IU/mL <20 - -   ESR 0 - 15 mm/h - - -       Rheumatoid Factor   Date Value Ref Range Status   12/23/2019 44 (H) <12 IU/mL Final   ,   Cyclic Citrullinated Peptide Antibody, IgG   Date Value Ref Range Status   01/15/2020 177 (H) <7 U/mL Final     Comment:     Positive   ,  ,  ,  ,  ,  ,   LONA Screen by EIA   Date Value Ref Range Status   09/16/2004 <1.0  Interpretation:  Negative  Final     Reviewed Rheumatology lab flowsheet

## 2021-10-26 RX ORDER — METHOTREXATE 2.5 MG/1
10 TABLET ORAL
Qty: 120 TABLET | Refills: 1 | Status: SHIPPED | OUTPATIENT
Start: 2021-10-26 | End: 2021-10-28

## 2021-10-26 NOTE — TELEPHONE ENCOUNTER
METHOTREXATE SODIUM 2.5MG TABS  Last Written Prescription Date:  7/29/2021  Last Fill Quantity: 40,   # refills: 2  Last Office Visit: 7/31/2020  Future Office visit:  10/28/2021    CBC RESULTS: Recent Labs   Lab Test 09/30/21  1415   WBC 8.1   RBC 4.71   HGB 14.6   HCT 43.7   MCV 93   MCH 31.0   MCHC 33.4   RDW 13.8          Creatinine   Date Value Ref Range Status   09/30/2021 0.96 0.66 - 1.25 mg/dL Final   05/07/2021 0.92 0.66 - 1.25 mg/dL Final   ]    Liver Function Studies -   Recent Labs   Lab Test 09/30/21  1415 01/15/20  1749 12/23/19  0850   PROTTOTAL  --   --  7.3   ALBUMIN  --   --  3.2*   BILITOTAL  --   --  0.2   ALKPHOS  --   --  98   AST 23   < > 12   ALT 59   < > 28    < > = values in this interval not displayed.       Routing refill request to provider for review/approval because:  Drug not on the [facility], P or [facility] refill protocol or controlled substance    Shadia Mauricio RN  Central Triage Red Flags/Med Refills

## 2021-10-28 ENCOUNTER — OFFICE VISIT (OUTPATIENT)
Dept: RHEUMATOLOGY | Facility: CLINIC | Age: 51
End: 2021-10-28
Payer: COMMERCIAL

## 2021-10-28 ENCOUNTER — TELEPHONE (OUTPATIENT)
Dept: RHEUMATOLOGY | Facility: CLINIC | Age: 51
End: 2021-10-28

## 2021-10-28 VITALS
HEIGHT: 74 IN | BODY MASS INDEX: 40.43 KG/M2 | DIASTOLIC BLOOD PRESSURE: 83 MMHG | OXYGEN SATURATION: 99 % | SYSTOLIC BLOOD PRESSURE: 120 MMHG | HEART RATE: 91 BPM | WEIGHT: 315 LBS

## 2021-10-28 DIAGNOSIS — R76.8 RHEUMATOID FACTOR POSITIVE WITH CYCLIC CITRULLINATED PEPTIDE (CCP) ANTIBODY NEGATIVE: Primary | ICD-10-CM

## 2021-10-28 PROCEDURE — 99215 OFFICE O/P EST HI 40 MIN: CPT | Performed by: INTERNAL MEDICINE

## 2021-10-28 RX ORDER — METHOTREXATE 2.5 MG/1
10 TABLET ORAL
Qty: 120 TABLET | Refills: 4 | Status: SHIPPED | OUTPATIENT
Start: 2021-10-28 | End: 2023-01-02

## 2021-10-28 ASSESSMENT — MIFFLIN-ST. JEOR: SCORE: 2459.27

## 2021-10-28 ASSESSMENT — PAIN SCALES - GENERAL: PAINLEVEL: NO PAIN (1)

## 2021-10-28 NOTE — TELEPHONE ENCOUNTER
PA Initiation    Medication: HUMIRA   Insurance Company: [employer] - Phone [phone] Fax [phone]  Pharmacy Filling the Rx: [facility] - [place], [place] - [facility] [address]  Filling Pharmacy Phone:    Filling Pharmacy Fax:    Start Date: 10/28/2021    IZABELLA ANGUIANO ([place]: [id number])

## 2021-10-28 NOTE — NURSING NOTE
"Chief Complaint   Patient presents with     RECHECK     Rheumatoid factor positive with cyclic citrullinated peptide (CCP) antibody negative       Vitals:    10/28/21 0727   BP: 120/83   BP Location: Left arm   Patient Position: Sitting   Cuff Size: Adult Large   Pulse: 91   SpO2: 99%   Weight: (!) 153.5 kg (338 lb 4.8 oz)   Height: 1.88 m (6' 2\")       Body mass index is 43.44 kg/m .     Tierra Schulte MA  "

## 2021-11-01 NOTE — TELEPHONE ENCOUNTER
Okay, my fault and not checking to align formal diagnosis with treatment.  Rheumatoid arthritis is indeed the diagnosis.

## 2021-11-01 NOTE — TELEPHONE ENCOUNTER
PA Initiation (RESUBMITTED WITH NEW DX)    Medication: HUMIRA   Insurance Company: Down - Phone [phone] Fax [phone]  Pharmacy Filling the Rx: [facility] - [place], [place] - [facility] [address]  Filling Pharmacy Phone:    Filling Pharmacy Fax:    Start Date: 11/1/2021    Jasper ANGUIANO ([place]: [id number])

## 2021-11-01 NOTE — TELEPHONE ENCOUNTER
Help!    Patient has poor prognosis seropositive rheumatoid arthritis, inadequately responsive to methotrexate.  What other documentation is needed to apply evidence-based medicine with combination methotrexate Humira therapy?

## 2021-11-02 ENCOUNTER — LAB (OUTPATIENT)
Dept: LAB | Facility: CLINIC | Age: 51
End: 2021-11-02
Payer: COMMERCIAL

## 2021-11-02 DIAGNOSIS — R76.8 RHEUMATOID FACTOR POSITIVE WITH CYCLIC CITRULLINATED PEPTIDE (CCP) ANTIBODY NEGATIVE: ICD-10-CM

## 2021-11-02 LAB — CRP SERPL-MCNC: <2.9 MG/L (ref 0–8)

## 2021-11-02 PROCEDURE — 87340 HEPATITIS B SURFACE AG IA: CPT

## 2021-11-02 PROCEDURE — 36415 COLL VENOUS BLD VENIPUNCTURE: CPT

## 2021-11-02 PROCEDURE — 86140 C-REACTIVE PROTEIN: CPT

## 2021-11-02 PROCEDURE — 86803 HEPATITIS C AB TEST: CPT

## 2021-11-02 PROCEDURE — 86481 TB AG RESPONSE T-CELL SUSP: CPT

## 2021-11-03 LAB
HBV SURFACE AG SERPL QL IA: NONREACTIVE
HCV AB SERPL QL IA: NONREACTIVE

## 2021-11-04 ENCOUNTER — TELEPHONE (OUTPATIENT)
Dept: RHEUMATOLOGY | Facility: CLINIC | Age: 51
End: 2021-11-04

## 2021-11-04 LAB
GAMMA INTERFERON BACKGROUND BLD IA-ACNC: 0.1 IU/ML
M TB IFN-G BLD-IMP: NEGATIVE
M TB IFN-G CD4+ BCKGRND COR BLD-ACNC: 9.9 IU/ML
MITOGEN IGNF BCKGRD COR BLD-ACNC: 0 IU/ML
MITOGEN IGNF BCKGRD COR BLD-ACNC: 0.02 IU/ML
QUANTIFERON MITOGEN: 10 IU/ML
QUANTIFERON NIL TUBE: 0.1 IU/ML
QUANTIFERON TB1 TUBE: 0.12 IU/ML
QUANTIFERON TB2 TUBE: 0.1

## 2021-11-04 NOTE — TELEPHONE ENCOUNTER
Called patient to leave voicemail to schedule a follow up appointment around 1/28/2022 per provider's check out note. Told patient to call back at [phone].    Fernanda Bland MA

## 2021-11-08 ENCOUNTER — ANCILLARY PROCEDURE (OUTPATIENT)
Dept: GENERAL RADIOLOGY | Facility: CLINIC | Age: 51
End: 2021-11-08
Attending: FAMILY MEDICINE
Payer: COMMERCIAL

## 2021-11-08 ENCOUNTER — OFFICE VISIT (OUTPATIENT)
Dept: URGENT CARE | Facility: URGENT CARE | Age: 51
End: 2021-11-08
Payer: COMMERCIAL

## 2021-11-08 ENCOUNTER — E-VISIT (OUTPATIENT)
Dept: URGENT CARE | Facility: CLINIC | Age: 51
End: 2021-11-08
Payer: COMMERCIAL

## 2021-11-08 VITALS
DIASTOLIC BLOOD PRESSURE: 72 MMHG | HEIGHT: 74 IN | WEIGHT: 315 LBS | BODY MASS INDEX: 40.43 KG/M2 | RESPIRATION RATE: 16 BRPM | TEMPERATURE: 99.1 F | HEART RATE: 104 BPM | OXYGEN SATURATION: 97 % | SYSTOLIC BLOOD PRESSURE: 120 MMHG

## 2021-11-08 DIAGNOSIS — R05.9 COUGH: Primary | ICD-10-CM

## 2021-11-08 DIAGNOSIS — J45.20 MILD INTERMITTENT ASTHMA WITHOUT COMPLICATION: ICD-10-CM

## 2021-11-08 DIAGNOSIS — R05.3 PERSISTENT COUGH: ICD-10-CM

## 2021-11-08 DIAGNOSIS — J20.9 ACUTE BRONCHITIS WITH COEXISTING CONDITION REQUIRING PROPHYLACTIC TREATMENT: ICD-10-CM

## 2021-11-08 PROCEDURE — 99207 PR NON-BILLABLE SERV PER CHARTING: CPT | Performed by: FAMILY MEDICINE

## 2021-11-08 PROCEDURE — 71046 X-RAY EXAM CHEST 2 VIEWS: CPT | Performed by: RADIOLOGY

## 2021-11-08 PROCEDURE — 99214 OFFICE O/P EST MOD 30 MIN: CPT | Performed by: FAMILY MEDICINE

## 2021-11-08 RX ORDER — PREDNISONE 20 MG/1
20 TABLET ORAL 2 TIMES DAILY
Qty: 10 TABLET | Refills: 0 | Status: SHIPPED | OUTPATIENT
Start: 2021-11-08 | End: 2021-11-13

## 2021-11-08 RX ORDER — ALBUTEROL SULFATE 90 UG/1
1-2 AEROSOL, METERED RESPIRATORY (INHALATION) EVERY 4 HOURS PRN
Qty: 18 G | Refills: 1 | Status: SHIPPED | OUTPATIENT
Start: 2021-11-08 | End: 2021-12-30

## 2021-11-08 RX ORDER — AZITHROMYCIN 250 MG/1
TABLET, FILM COATED ORAL
Qty: 6 TABLET | Refills: 0 | Status: SHIPPED | OUTPATIENT
Start: 2021-11-08 | End: 2021-11-13

## 2021-11-08 RX ORDER — IPRATROPIUM BROMIDE AND ALBUTEROL SULFATE 2.5; .5 MG/3ML; MG/3ML
3 SOLUTION RESPIRATORY (INHALATION) EVERY 6 HOURS PRN
Qty: 30 ML | Refills: 1 | Status: SHIPPED | OUTPATIENT
Start: 2021-11-08 | End: 2021-12-30

## 2021-11-08 ASSESSMENT — MIFFLIN-ST. JEOR: SCORE: 2457.91

## 2021-11-08 NOTE — TELEPHONE ENCOUNTER
Rcvd fax for addtnl information, faxed it back in to [facility] after confirming on CMM this was what Joya changed dx to

## 2021-11-08 NOTE — PATIENT INSTRUCTIONS
Dear Gonzales Melo,    We are sorry you are not feeling well. Based on the responses you provided, it is recommended that you be seen in-person in urgent care so we can better evaluate your symptoms. Please click here to find the nearest urgent care location to you.   You will not be charged for this Visit. Thank you for trusting us with your care.    Liliam Narvaez MD

## 2021-11-09 NOTE — TELEPHONE ENCOUNTER
Prior Authorization Approval    Authorization Effective Date: 11/8/2021  Authorization Expiration Date: 11/8/2022  Medication: HUMIRA - APPROVED   Approved Dose/Quantity:  2 FOR 28 DAYS   Reference #:     Insurance Company: [employer] - Phone [phone] Fax [phone]  Expected CoPay: $5     CoPay Card Available:      Foundation Assistance Needed:    Which Pharmacy is filling the prescription (Not needed for infusion/clinic administered): [facility] - [place], [place] - [address]  Pharmacy Notified: Yes  Patient Notified: Yes

## 2021-11-09 NOTE — PROGRESS NOTES
Chief Complaint   Patient presents with     Asthma     coughing, congestion, SOB x 2 weeks,  using his inhalers- HX of bronchitis     Refill Request     inhaler and duoneb     Initial differential diagnosis included but was not restricted to   Differential Diagnosis:  URI Adult/Peds:  Asthma, Asthma exacerbation, Bronchitis-viral, Pneumonia, Sinusitis, Strep pharyngitis, Tonsilitis, Viral pharyngitis, Viral syndrome and Viral upper respiratory illness  Medical Decision Making:    ASSESMENT AND PLAN     Gonzales was seen today for asthma and refill request.    Diagnoses and all orders for this visit:    Cough  -     XR Chest 2 Views    Mild intermittent asthma without complication  -     XR Chest 2 Views  -     ipratropium - albuterol 0.5 mg/2.5 mg/3 mL (DUONEB) 0.5-2.5 (3) MG/3ML neb solution; Take 1 vial (3 mLs) by nebulization every 6 hours as needed for shortness of breath / dyspnea  -     predniSONE (DELTASONE) 20 MG tablet; Take 1 tablet (20 mg) by mouth 2 times daily for 5 days    Persistent cough  -     XR Chest 2 Views    Acute bronchitis with coexisting condition requiring prophylactic treatment  -     azithromycin (ZITHROMAX) 250 MG tablet; Take 2 tablets (500 mg) by mouth daily for 1 day, THEN 1 tablet (250 mg) daily for 4 days.    Other orders  -     albuterol (PROAIR HFA/PROVENTIL HFA/VENTOLIN HFA) 108 (90 Base) MCG/ACT inhaler; Inhale 1-2 puffs into the lungs every 4 hours as needed for shortness of breath / dyspnea or wheezing          Fluids, Rest and OTC cough suppressant/expectorant  Routine discharge counseling was given to the patient and the patient understands that worsening, changing or persistent symptoms should prompt an immediate call or follow up with their primary physician or the emergency department. The importance of appropriate follow up was also discussed with the patient.     I have reviewed the nursing notes.    Review of the result(s) of each unique test -     X-Ray was done, my  findings are: no infiltrate noted     30 mins Time  spent on the date of the encounter doing chart review, review of test results, interpretation of tests, patient visit and documentation     see orders in Epic  Pt verbalized and agreed with the plan and is aware of the worsening symptoms for which would need to follow up .  Pt was stable during time of discharge from the clinic     SUBJECTIVE     Gonzales Melo is a 51 year old male presenting with a chief complaint of    Chief Complaint   Patient presents with     Asthma     coughing, congestion, SOB x 2 weeks,  using his inhalers- HX of bronchitis     Refill Request     inhaler and duoneb           Gonzales Melo is a 51 year old male with h/o asthma presenting with a chief complaint of cough - non-productive, cough - productive, wheezing and shortness of breath. He is an established patient of [facility].  Onset of symptoms was 2 week(s) ago.  Course of illness is worsening.    Severity moderate  Current and Associated symptoms: cough - non-productive, cough - productive and wheezing  Treatment measures tried include Inhaler (name: ).  Predisposing factors include HX of asthma.    Past Medical History:   Diagnosis Date     Arthritis     RA     Asthma, mild intermittent      Obesity 1/30/2014     Plantar fasciitis 1/30/2014     Current Outpatient Medications   Medication Sig Dispense Refill     adalimumab (HUMIRA *CF*) 40 MG/0.4ML prefilled syringe kit Inject 0.4 mLs (40 mg) Subcutaneous every 14 days 1 each 5     albuterol (PROAIR HFA/PROVENTIL HFA/VENTOLIN HFA) 108 (90 Base) MCG/ACT inhaler Inhale 1-2 puffs into the lungs every 4 hours as needed for shortness of breath / dyspnea or wheezing 18 g 1     azithromycin (ZITHROMAX) 250 MG tablet Take 2 tablets (500 mg) by mouth daily for 1 day, THEN 1 tablet (250 mg) daily for 4 days. 6 tablet 0     fluticasone (FLONASE) 50 MCG/ACT spray Spray 1-2 sprays into both nostrils daily 1 Bottle 11     ipratropium -  "albuterol 0.5 mg/2.5 mg/3 mL (DUONEB) 0.5-2.5 (3) MG/3ML neb solution Take 1 vial (3 mLs) by nebulization every 6 hours as needed for shortness of breath / dyspnea 30 mL 1     methotrexate sodium 2.5 MG TABS Take 10 tablets (25 mg) by mouth every 7 days 120 tablet 4     predniSONE (DELTASONE) 20 MG tablet Take 1 tablet (20 mg) by mouth 2 times daily for 5 days 10 tablet 0     Social History     Tobacco Use     Smoking status: Never Smoker     Smokeless tobacco: Never Used   Substance Use Topics     Alcohol use: No     Family History   Problem Relation Age of Onset     Family History Negative Mother      Hypertension Maternal Grandmother      Rheumatoid Arthritis Maternal Grandmother      Heart Disease Brother          ROS:    10 point ROS of systems including Constitutional, Eyes, Cardiovascular, Gastroenterology, Genitourinary, Integumentary, Muscularskeletal, Psychiatric ,neurological were all negative except for pertinent positives noted in my HPI         OBJECTIVE:    /72 (BP Location: Right arm, Patient Position: Left side, Cuff Size: Adult Large)   Pulse 104   Temp 99.1  F (37.3  C) (Oral)   Resp 16   Ht 1.88 m (6' 2\")   Wt (!) 153.3 kg (338 lb)   SpO2 97%   BMI 43.40 kg/m    GENERAL APPEARANCE: healthy, alert and no distress  EYES: EOMI,  PERRL, conjunctiva clear  HENT: ear canals and TM's normal.  Nose and mouth without ulcers, erythema or lesions  RESP: lungs good air entry positive for rales no  rhonchi or wheezes  CV: regular rates and rhythm, normal S1 S2, no murmur noted  ABDOMEN:  soft, nontender, no HSM or masses and bowel sounds normal  PSYCH: mentation appears normal  Physical Exam      (Note was completed, in part, with [employer] voice-recognition software. Documentation reviewed, but some grammatical, spelling, and word errors may remain.)  Chitra Lancaster MD on 11/8/2021 at 8:27 PM              "

## 2021-11-18 ENCOUNTER — TELEPHONE (OUTPATIENT)
Dept: RHEUMATOLOGY | Facility: CLINIC | Age: 51
End: 2021-11-18

## 2021-11-18 NOTE — TELEPHONE ENCOUNTER
Specialty pharmacy is calling to let Dr. Vaz know that they are unable to reach [facility] to fill the Humira.

## 2021-11-19 NOTE — TELEPHONE ENCOUNTER
Please send patient MyChart message inquiring what we can do to assist with recommended treatment.   Thanks.

## 2021-12-30 ENCOUNTER — OFFICE VISIT (OUTPATIENT)
Dept: FAMILY MEDICINE | Facility: CLINIC | Age: 51
End: 2021-12-30
Payer: COMMERCIAL

## 2021-12-30 ENCOUNTER — LAB (OUTPATIENT)
Dept: LAB | Facility: CLINIC | Age: 51
End: 2021-12-30
Payer: COMMERCIAL

## 2021-12-30 VITALS
OXYGEN SATURATION: 97 % | WEIGHT: 315 LBS | DIASTOLIC BLOOD PRESSURE: 84 MMHG | SYSTOLIC BLOOD PRESSURE: 118 MMHG | HEART RATE: 96 BPM | TEMPERATURE: 98.9 F | BODY MASS INDEX: 45.01 KG/M2

## 2021-12-30 DIAGNOSIS — J45.20 MILD INTERMITTENT ASTHMA WITHOUT COMPLICATION: ICD-10-CM

## 2021-12-30 DIAGNOSIS — J45.21 MILD INTERMITTENT ASTHMA WITH EXACERBATION: Primary | ICD-10-CM

## 2021-12-30 DIAGNOSIS — R76.8 RHEUMATOID FACTOR POSITIVE WITH CYCLIC CITRULLINATED PEPTIDE (CCP) ANTIBODY NEGATIVE: ICD-10-CM

## 2021-12-30 LAB
ALT SERPL W P-5'-P-CCNC: 60 U/L (ref 0–70)
AST SERPL W P-5'-P-CCNC: 32 U/L (ref 0–45)
CREAT SERPL-MCNC: 0.91 MG/DL (ref 0.66–1.25)
ERYTHROCYTE [DISTWIDTH] IN BLOOD BY AUTOMATED COUNT: 14.7 % (ref 10–15)
GFR SERPL CREATININE-BSD FRML MDRD: >90 ML/MIN/1.73M2
HCT VFR BLD AUTO: 43.7 % (ref 40–53)
HGB BLD-MCNC: 14.3 G/DL (ref 13.3–17.7)
MCH RBC QN AUTO: 31.2 PG (ref 26.5–33)
MCHC RBC AUTO-ENTMCNC: 32.7 G/DL (ref 31.5–36.5)
MCV RBC AUTO: 95 FL (ref 78–100)
PLATELET # BLD AUTO: 322 10E3/UL (ref 150–450)
RBC # BLD AUTO: 4.59 10E6/UL (ref 4.4–5.9)
WBC # BLD AUTO: 6.8 10E3/UL (ref 4–11)

## 2021-12-30 PROCEDURE — 84460 ALANINE AMINO (ALT) (SGPT): CPT

## 2021-12-30 PROCEDURE — 85027 COMPLETE CBC AUTOMATED: CPT

## 2021-12-30 PROCEDURE — 99214 OFFICE O/P EST MOD 30 MIN: CPT | Performed by: PHYSICIAN ASSISTANT

## 2021-12-30 PROCEDURE — 84450 TRANSFERASE (AST) (SGOT): CPT

## 2021-12-30 PROCEDURE — 36415 COLL VENOUS BLD VENIPUNCTURE: CPT

## 2021-12-30 PROCEDURE — 82565 ASSAY OF CREATININE: CPT

## 2021-12-30 RX ORDER — ALBUTEROL SULFATE 90 UG/1
1-2 AEROSOL, METERED RESPIRATORY (INHALATION) EVERY 4 HOURS PRN
Qty: 18 G | Refills: 1 | Status: SHIPPED | OUTPATIENT
Start: 2021-12-30 | End: 2023-02-01

## 2021-12-30 RX ORDER — IPRATROPIUM BROMIDE AND ALBUTEROL SULFATE 2.5; .5 MG/3ML; MG/3ML
3 SOLUTION RESPIRATORY (INHALATION) EVERY 6 HOURS PRN
Qty: 30 ML | Refills: 1 | Status: SHIPPED | OUTPATIENT
Start: 2021-12-30 | End: 2023-02-01

## 2021-12-30 RX ORDER — AZITHROMYCIN 250 MG/1
TABLET, FILM COATED ORAL
Qty: 6 TABLET | Refills: 0 | Status: SHIPPED | OUTPATIENT
Start: 2021-12-30 | End: 2022-02-24

## 2021-12-30 RX ORDER — PREDNISONE 20 MG/1
TABLET ORAL
Qty: 20 TABLET | Refills: 0 | Status: SHIPPED | OUTPATIENT
Start: 2021-12-30 | End: 2022-02-24

## 2021-12-30 NOTE — PATIENT INSTRUCTIONS
Take the complete course of the antibiotic and steroid.    You can use inhalers and nebulizer as needed.

## 2021-12-30 NOTE — PROGRESS NOTES
Assessment & Plan     Mild intermittent asthma with exacerbation    Current exacerbation. Treat with antibiotic and prednisone, especially since he is immunocompromised.    - azithromycin (ZITHROMAX Z-KEELY) 250 MG tablet; Take 2 pills on day 1. Then take I pill daily for remaining 4 days.  - predniSONE (DELTASONE) 20 MG tablet; Take 3 pills (60 mg) for 3 days. Then take 2 pills (40 mg) for 3 days. Then take 1 pill (20 mg) for 3 days. Then take 1/2 pill (10 mg) for 3 days.      Mild intermittent asthma without complication    Refills given.    - ipratropium - albuterol 0.5 mg/2.5 mg/3 mL (DUONEB) 0.5-2.5 (3) MG/3ML neb solution; Take 1 vial (3 mLs) by nebulization every 6 hours as needed for shortness of breath / dyspnea  - albuterol (PROAIR HFA/PROVENTIL HFA/VENTOLIN HFA) 108 (90 Base) MCG/ACT inhaler; Inhale 1-2 puffs into the lungs every 4 hours as needed for shortness of breath / dyspnea or wheezing               Patient Instructions   Take the complete course of the antibiotic and steroid.    You can use inhalers and nebulizer as needed.       No follow-ups on file.    Danny Canas PA-C  [facility]    Chandana Narayan is a 51 year old who presents for the following health issues     HPI     Acute Illness  Acute illness concerns: bronchitis, ear infection, sinus infection   Onset/Duration: end of October- this episode has been for 3-4 weeks but never fully resolved last night    Symptoms:  Fever: no  Chills/Sweats: no  Headache (location?): no  Sinus Pressure: YES  Conjunctivitis:  no  Ear Pain: YES- some pressure- left ear   Rhinorrhea: no  Congestion: YES  Sore Throat: no  Cough: YES-productive of yellow sputum  Wheeze: YES  Decreased Appetite: no  Nausea: no  Vomiting: no  Diarrhea: no  Dysuria/Freq.: no  Dysuria or Hematuria: no  Fatigue/Achiness: no  Sick/Strep Exposure: no  Therapies tried and outcome: azithromycin, prednisone with some relief (treated about 2 months  ago)    Asthma Follow-Up    Was ACT completed today?    Yes    ACT Total Scores 12/30/2021   ACT TOTAL SCORE -   ASTHMA ER VISITS -   ASTHMA HOSPITALIZATIONS -   ACT TOTAL SCORE (Goal Greater than or Equal to 20) 15   In the past 12 months, how many times did you visit the emergency room for your asthma without being admitted to the hospital? 0   In the past 12 months, how many times were you hospitalized overnight because of your asthma? 0         How many days per week do you miss taking your asthma controller medication?  0, pt usually doesn't take asthma controller medication     Please describe any recent triggers for your asthma: upper respiratory infections and cold air    Have you had any Emergency Room Visits, Urgent Care Visits, or Hospital Admissions since your last office visit?  No        Review of Systems   Constitutional, HEENT, cardiovascular, pulmonary, gi and gu systems are negative, except as otherwise noted.        Objective    /84 (BP Location: Right arm, Patient Position: Chair, Cuff Size: Adult Large)   Pulse 96   Temp 98.9  F (37.2  C) (Oral)   Wt (!) 159 kg (350 lb 9 oz)   SpO2 97%   BMI 45.01 kg/m    Body mass index is 45.01 kg/m .       Physical Exam   GENERAL: healthy, alert and no distress  EYES: Eyes grossly normal to inspection, PERRL and conjunctivae and sclerae normal  HENT: ear canals and TM's normal, nose and mouth without ulcers or lesions  RESP: no rales , no rhonchi and expiratory wheezes R upper posterior and L upper posterior  CV: regular rate and rhythm, normal S1 S2, no S3 or S4, no murmur, click or rub, no peripheral edema and peripheral pulses strong  MS: no gross musculoskeletal defects noted, no edema  SKIN: no suspicious lesions or rashes  NEURO: Normal strength and tone, mentation intact and speech normal  PSYCH: mentation appears normal, affect normal/bright  LYMPH: no cervical, supraclavicular, axillary, or inguinal adenopathy

## 2021-12-31 ASSESSMENT — ASTHMA QUESTIONNAIRES: ACT_TOTALSCORE: 15

## 2022-01-03 ENCOUNTER — MYC MEDICAL ADVICE (OUTPATIENT)
Dept: RHEUMATOLOGY | Facility: CLINIC | Age: 52
End: 2022-01-03
Payer: COMMERCIAL

## 2022-01-17 ENCOUNTER — MYC MEDICAL ADVICE (OUTPATIENT)
Dept: FAMILY MEDICINE | Facility: CLINIC | Age: 52
End: 2022-01-17
Payer: COMMERCIAL

## 2022-01-17 NOTE — TELEPHONE ENCOUNTER
Pt called and asked if he could be prescribed a different antibiotic for sinus infection . Pt states he feels like the Z-PAC that he was prescribed isn't helping with symptoms.    Lucy Valdez / EMT-B  [facility] / [place]

## 2022-01-19 ENCOUNTER — VIRTUAL VISIT (OUTPATIENT)
Dept: FAMILY MEDICINE | Facility: CLINIC | Age: 52
End: 2022-01-19
Payer: COMMERCIAL

## 2022-01-19 VITALS
RESPIRATION RATE: 19 BRPM | TEMPERATURE: 99 F | HEART RATE: 98 BPM | BODY MASS INDEX: 44 KG/M2 | SYSTOLIC BLOOD PRESSURE: 118 MMHG | WEIGHT: 315 LBS | DIASTOLIC BLOOD PRESSURE: 70 MMHG | OXYGEN SATURATION: 95 %

## 2022-01-19 DIAGNOSIS — J01.90 ACUTE SINUSITIS WITH SYMPTOMS > 10 DAYS: Primary | ICD-10-CM

## 2022-01-19 PROCEDURE — 99213 OFFICE O/P EST LOW 20 MIN: CPT | Performed by: PHYSICIAN ASSISTANT

## 2022-01-19 NOTE — PROGRESS NOTES
{PROVIDER CHARTING PREFERENCE:840726}    Chandana Narayan is a 51 year old who presents for the following health issues     HPI     Acute Illness  Acute illness concerns: Sinus and chest congestion  Onset/Duration: October 2021  Symptoms:  Fever: no  Chills/Sweats: no  Headache (location?): no  Sinus Pressure: YES  Conjunctivitis:  YES- right eye discharge this morning  Ear Pain: no  Rhinorrhea: YES  Congestion: YES  Sore Throat: YES  Cough: YES-productive of green sputum  Wheeze: YES  Decreased Appetite: no  Nausea: no  Vomiting: no  Diarrhea: no  Dysuria/Freq.: no  Dysuria or Hematuria: no  Fatigue/Achiness: no  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      Objective           Vitals:  No vitals were obtained today due to virtual visit.    Physical Exam   healthy, alert and no distress  PSYCH: Alert and oriented times 3; coherent speech, normal   rate and volume, able to articulate logical thoughts, able   to abstract reason, no tangential thoughts, no hallucinations   or delusions  His affect is normal and pleasant  RESP: No cough, no audible wheezing, able to talk in full sentences  Remainder of exam unable to be completed due to telephone visits    {Diagnostic Test Results (Optional):288804}    {AMBULATORY ATTESTATION (Optional):171590}        Phone call duration: *** minutes

## 2022-01-19 NOTE — PROGRESS NOTES
Assessment & Plan     Acute sinusitis with symptoms > 10 days    Treat with augmentin since azithromycin doesn't treat sinuses as well. Lung symptoms are improved and no wheezing on exam today.    - amoxicillin-clavulanate (AUGMENTIN) 875-125 MG tablet; Take 1 tablet by mouth 2 times daily for 14 days               There are no Patient Instructions on file for this visit.    No follow-ups on file.    OMARI Fong [facility] DEANDRE Narayan is a 51 year old who presents for the following health issues     HPI     Acute Illness   Acute illness concerns: Sinus and chest congestion  Onset/Duration: October 2021  Symptoms:  Fever: no  Chills/Sweats: no  Headache (location?): no  Sinus Pressure: YES  Conjunctivitis:  YES- right eye discharge this morning  Ear Pain: no  Rhinorrhea: YES  Congestion: YES  Sore Throat: YES  Cough: YES-productive of green sputum  Wheeze: YES  Decreased Appetite: no  Nausea: no  Vomiting: no  Diarrhea: no  Dysuria/Freq.: no  Dysuria or Hematuria: no  Fatigue/Achiness: no  Sick/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        Objective    /70 (BP Location: Right arm, Patient Position: Sitting, Cuff Size: Adult Large)   Pulse 98   Temp 99  F (37.2  C) (Oral)   Resp 19   Wt (!) 155.4 kg (342 lb 11.2 oz)   SpO2 95%   BMI 44.00 kg/m    Body mass index is 44 kg/m .       Physical Exam   GENERAL: healthy, alert and no distress  EYES: Eyes grossly normal to inspection, PERRL and conjunctivae and sclerae normal  RESP: lungs clear to auscultation - no rales, rhonchi or wheezes  CV: regular rate and rhythm, normal S1 S2, no S3 or S4, no murmur, click or rub, no peripheral edema and peripheral pulses strong  MS: no gross musculoskeletal defects noted, no edema  SKIN: no suspicious lesions or  rashes  NEURO: Normal strength and tone, mentation intact and speech normal  PSYCH: mentation appears normal, affect normal/bright

## 2022-02-13 ENCOUNTER — HEALTH MAINTENANCE LETTER (OUTPATIENT)
Age: 52
End: 2022-02-13

## 2022-02-24 ENCOUNTER — OFFICE VISIT (OUTPATIENT)
Dept: FAMILY MEDICINE | Facility: CLINIC | Age: 52
End: 2022-02-24
Payer: COMMERCIAL

## 2022-02-24 VITALS
SYSTOLIC BLOOD PRESSURE: 112 MMHG | HEIGHT: 74 IN | WEIGHT: 315 LBS | OXYGEN SATURATION: 98 % | BODY MASS INDEX: 40.43 KG/M2 | HEART RATE: 94 BPM | RESPIRATION RATE: 18 BRPM | DIASTOLIC BLOOD PRESSURE: 64 MMHG | TEMPERATURE: 97.3 F

## 2022-02-24 DIAGNOSIS — E66.01 MORBID OBESITY (H): ICD-10-CM

## 2022-02-24 DIAGNOSIS — M05.79 RHEUMATOID ARTHRITIS WITH RHEUMATOID FACTOR OF MULTIPLE SITES WITHOUT ORGAN OR SYSTEMS INVOLVEMENT (H): ICD-10-CM

## 2022-02-24 DIAGNOSIS — M77.11 LATERAL EPICONDYLITIS OF RIGHT ELBOW: Primary | ICD-10-CM

## 2022-02-24 PROBLEM — M06.9 RA (RHEUMATOID ARTHRITIS) (H): Status: ACTIVE | Noted: 2022-02-24

## 2022-02-24 PROCEDURE — 99213 OFFICE O/P EST LOW 20 MIN: CPT | Performed by: NURSE PRACTITIONER

## 2022-02-24 NOTE — PATIENT INSTRUCTIONS
Aleve, 1-2 tablets every 12 hours scheduled for 5-7 days then may use as needed (take with food).      Ice application 3 times daily for 20 minutes.     Counter-force brace.     With no improvement or worsening plan further consultation with orthopedics.      Patient Education     Understanding Lateral Epicondylitis     Tendons are strong bands of tissue that connect muscles to bones. Lateral epicondylitis affects the tendons that connect muscles in the forearm to the lateral epicondyle. This is the bony knob on the outer side of the elbow. The condition occurs if the extensor tendons of the wrist become painful and swollen (irritated). This can cause pain in the elbow, forearm, and wrist. Because the condition is sometimes caused by playing tennis, it's also known as  tennis elbow.     How to say it  LA-tuhr-nubia [id number]   What causes lateral epicondylitis?  The condition most often occurs because of overuse. This can be from any activity that repeatedly puts stress on the forearm extensor muscles or tendons and wrist. For instance, playing tennis, lifting weights, cutting meat, painting, and typing can all cause the condition. Wear and tear of the tendons from aging or an injury to the tendons can also cause the condition.   Symptoms of lateral epicondylitis  The most common symptom is pain. You may feel it on the outer side of the elbow and down the back of the forearm. It may be worse when moving or using the elbow, forearm, or wrist. You may also feel pain when gripping or lifting things.   Treatment for lateral epicondylitis  Treatments may include:    Resting the elbow, forearm, and wrist. You ll need to avoid movements that can make your symptoms worse. You also may need to avoid certain sports and types of work for a time. This helps relieve symptoms and prevent further damage to the tendons.    Changing the action that caused the problem. For instance, if the tendons were damaged from playing  tennis, it may help to change your playing technique or use different equipment. This helps prevent further damage to the tendons.    Using cold packs. Putting an ice pack on the injured area can help reduce pain and swelling.    Taking pain medicines. Taking prescription or over-the-counter pain medicines may help reduce pain and swelling.      Wearing a brace. This helps reduce strain on the muscles and tendons in the forearm, which may relieve symptoms. It's very important to wear the brace properly.    Doing exercises and physical therapy. These help improve strength and range of motion in the elbow, forearm, and wrist.    Getting shots of medicine into the injured area. These may help relieve symptoms for a time.    Having surgery. This may be an option if other treatments fail to relieve symptoms. In many cases, the surgeon removes the damaged tissue.  Possible complications of lateral epicondylitis  If the tendons involved don t heal properly, symptoms may return or get worse. To help prevent this, follow your treatment plan as directed.   When to call your healthcare provider  Call your healthcare provider right away if you have any of these:    Fever of 100.4 F (38 C) or higher, or as directed by your provider    Chills    Redness, swelling, or warmth in the elbow or forearm that gets worse    Symptoms that don t get better with treatment, or get worse    New symptoms  Tommy last reviewed this educational content on 6/1/2019 2000-2021 [employer]. All rights reserved. This information is not intended as a substitute for professional medical care. Always follow your healthcare professional's instructions.

## 2022-02-24 NOTE — PROGRESS NOTES
"  Assessment & Plan     Gonzales was seen today for musculoskeletal problem.    Diagnoses and all orders for this visit:    Lateral epicondylitis of right elbow  Aleve, 1-2 tablets every 12 hours scheduled for 5-7 days then may use as needed (take with food).    Ice application 3 times daily for 20 minutes.   Counter-force brace.   With no improvement or worsening plan further consultation with orthopedics.    -     Orthopedic  Referral; Future  -     Wrist/Arm/Hand Supplies Order for DME - ONLY FOR DME    Rheumatoid arthritis with rheumatoid factor of multiple sites without organ or systems involvement (H)  Stable[facility] Dr. Mois[facility] Vaz    Morbid obesity (H)  Continue to work with patient regarding dietary and lifestyle modifications to support weight loss.             BMI:   Estimated body mass index is 44.68 kg/m  as calculated from the following:    Height as of this encounter: 1.88 m (6' 2\").    Weight as of this encounter: 157.9 kg (348 lb).     Return in about 2 weeks (around 3/10/2022) for No improvement or sooner with worsening symptoms.    Janell Leigh, JED [facility] PRIOR BRIDGETT Narayan is a 51 year old who presents for the following health issues:      HPI     Pain History:    Onset - 1.5 months ago.    Difficulty with gripping and extending.  Gripping feels slightly weak.    Pain slightly extends down into forearm.    Tried a couple doses of Tylenol/Motrin without much improvement.   Has not had this previously.      When did you first notice your pain? - 1 to 6 weeks   Have you seen any provider previously for this issue? No  How has your pain affected your ability to work? 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 Musculoskeletal problem/pain  Onset/Duration: 6 weeks- when gripping with hand it hurts more.  Does have RA - different than RA pain.  " "  Description  Location: elbow - right  Joint Swelling: no  Redness: no  Pain: YES  Warmth: no  Intensity:  mild  Progression of Symptoms:  same  Accompanying signs and symptoms:   Fevers: no  Numbness/tingling/weakness: no  History  Trauma to the area: the only thing he can think of was dog sitting a few weeks ago, would hold on to the toy with right hand and he would pull on the toys not sure if related.   Recent illness:  no  Previous similar problem: no  Previous evaluation:  No   Precipitating or alleviating factors:  Aggravating factors include:  gripping  Therapies tried and outcome: tylenol, motrin- doesn't help         Review of Systems   Constitutional, HEENT, cardiovascular, pulmonary, gi and gu systems are negative, except as otherwise noted.      Objective    /64   Pulse 94   Temp 97.3  F (36.3  C)   Resp 18   Ht 1.88 m (6' 2\")   Wt (!) 157.9 kg (348 lb)   SpO2 98%   BMI 44.68 kg/m    Body mass index is 44.68 kg/m .  Physical Exam   GENERAL: healthy, alert and no distress  MS: right elbow with full ROM, no erythema, ecchymosis or swelling, tenderness to palpation along lateral epicondyle  PSYCH: mentation appears normal, affect normal/bright          "

## 2022-04-19 ENCOUNTER — LAB (OUTPATIENT)
Dept: LAB | Facility: CLINIC | Age: 52
End: 2022-04-19
Payer: COMMERCIAL

## 2022-04-19 DIAGNOSIS — R76.8 RHEUMATOID FACTOR POSITIVE WITH CYCLIC CITRULLINATED PEPTIDE (CCP) ANTIBODY NEGATIVE: ICD-10-CM

## 2022-04-19 LAB
ALT SERPL W P-5'-P-CCNC: 43 U/L (ref 0–70)
AST SERPL W P-5'-P-CCNC: 19 U/L (ref 0–45)
CREAT SERPL-MCNC: 1.01 MG/DL (ref 0.66–1.25)
ERYTHROCYTE [DISTWIDTH] IN BLOOD BY AUTOMATED COUNT: 13.4 % (ref 10–15)
GFR SERPL CREATININE-BSD FRML MDRD: 90 ML/MIN/1.73M2
HCT VFR BLD AUTO: 45 % (ref 40–53)
HGB BLD-MCNC: 14.9 G/DL (ref 13.3–17.7)
MCH RBC QN AUTO: 30.8 PG (ref 26.5–33)
MCHC RBC AUTO-ENTMCNC: 33.1 G/DL (ref 31.5–36.5)
MCV RBC AUTO: 93 FL (ref 78–100)
PLATELET # BLD AUTO: 352 10E3/UL (ref 150–450)
RBC # BLD AUTO: 4.84 10E6/UL (ref 4.4–5.9)
WBC # BLD AUTO: 7.2 10E3/UL (ref 4–11)

## 2022-04-19 PROCEDURE — 82565 ASSAY OF CREATININE: CPT

## 2022-04-19 PROCEDURE — 85027 COMPLETE CBC AUTOMATED: CPT

## 2022-04-19 PROCEDURE — 84460 ALANINE AMINO (ALT) (SGPT): CPT

## 2022-04-19 PROCEDURE — 36415 COLL VENOUS BLD VENIPUNCTURE: CPT

## 2022-04-19 PROCEDURE — 84450 TRANSFERASE (AST) (SGOT): CPT

## 2022-04-20 ENCOUNTER — TELEPHONE (OUTPATIENT)
Dept: RHEUMATOLOGY | Facility: CLINIC | Age: 52
End: 2022-04-20
Payer: COMMERCIAL

## 2022-04-20 NOTE — TELEPHONE ENCOUNTER
[facility] Prior Authorization Team   Phone: [phone]  Fax: [phone]    PA Initiation    Medication: Humira  Insurance Company: [employer] - Phone [phone] Fax [phone]  Pharmacy Filling the Rx: [facility] - [place], [place] - [address]  Filling Pharmacy Phone: [phone]  Filling Pharmacy Fax: [phone]  Start Date: 4/20/2022

## 2022-04-26 NOTE — TELEPHONE ENCOUNTER
Prior Authorization Approval    Authorization Effective Date: 4/22/2022  Authorization Expiration Date: 10/19/2022  Medication: Humira  Approved Dose/Quantity: 2ml/28 days  Reference #: [id number]   Insurance Company: [employer] - Phone [phone] Fax [phone]  Expected CoPay: $5     CoPay Card Available: Yes    Foundation Assistance Needed:    Which Pharmacy is filling the prescription (Not needed for infusion/clinic administered): [facility] - [facility] [address]  Pharmacy Notified: Yes  Patient Notified: Yes

## 2022-05-16 ENCOUNTER — MYC MEDICAL ADVICE (OUTPATIENT)
Dept: RHEUMATOLOGY | Facility: CLINIC | Age: 52
End: 2022-05-16
Payer: COMMERCIAL

## 2022-05-16 NOTE — LETTER
[address] [address]  Phone: [phone]  Fax: [phone]               To whom it may concern,       Gonzales Melo, : 1970, is a rheumatology patient at the [facility] in [place]. He is considered immunocompromised on the basis of rheumatic disease diagnosis. If you have any further questions please reach out to our office directly.       Jenelle Quinn RN on behalf of Dr. Vaz

## 2022-05-18 ENCOUNTER — TELEPHONE (OUTPATIENT)
Dept: FAMILY MEDICINE | Facility: CLINIC | Age: 52
End: 2022-05-18
Payer: COMMERCIAL

## 2022-05-18 ENCOUNTER — VIRTUAL VISIT (OUTPATIENT)
Dept: FAMILY MEDICINE | Facility: CLINIC | Age: 52
End: 2022-05-18
Payer: COMMERCIAL

## 2022-05-18 DIAGNOSIS — Z79.899 IMMUNOSUPPRESSION DUE TO DRUG THERAPY (H): ICD-10-CM

## 2022-05-18 DIAGNOSIS — D84.821 IMMUNOSUPPRESSION DUE TO DRUG THERAPY (H): ICD-10-CM

## 2022-05-18 DIAGNOSIS — E66.01 MORBID OBESITY (H): ICD-10-CM

## 2022-05-18 DIAGNOSIS — M06.9 RHEUMATOID ARTHRITIS, INVOLVING UNSPECIFIED SITE, UNSPECIFIED WHETHER RHEUMATOID FACTOR PRESENT (H): ICD-10-CM

## 2022-05-18 DIAGNOSIS — U07.1 INFECTION DUE TO 2019 NOVEL CORONAVIRUS: Primary | ICD-10-CM

## 2022-05-18 PROCEDURE — 99213 OFFICE O/P EST LOW 20 MIN: CPT | Mod: 95 | Performed by: PHYSICIAN ASSISTANT

## 2022-05-18 NOTE — TELEPHONE ENCOUNTER
Pt calls, has covid and wants to know how to get treatment, dicussed virtual visit with any available provider via [email or web address], pt taking biologics, pt has now red flag symptoms, diagnosis Monday night where covid test was positive, pt will initiate Virtual visit now    Shirin Lara RN, BSN  [facility]

## 2022-05-18 NOTE — PATIENT INSTRUCTIONS
"  Instructions for Patients  Your COVID-19 test was positive. This means you have the virus. Please follow the \"How can I take care of myself\" and \"How do I self-isolate?\" guidelines in these instructions.    What treatments are available?  Over-the-counter medicines may help with your symptoms such as runny or stuffy nose, cough, chills, and fever. Talk to your care team about your options.     Some people are at high risk for severe illness (for example if you have a weak immune system, you're 65 or older, or you have certain medical problems). If your risk it high and your symptoms started in the last 5 to 7 days, we strongly recommend for you to get COVID treatment as soon as possible before you get really sick. Paxlovid, Molnupiravir and the monoclonal antibody treatments are proven safe and effective, make you feel better faster, and prevent hospitalization and death.       You can schedule an appointment to discuss COVID treatment by requesting an appointment on [facility] by selecting \"schedule COVID-19 Treatment\" or by calling [employer] ([phone]) and pressing 7.    What are the symptoms of COVID-19?  Symptoms can include fever, cough, shortness of breath, chills, headache, muscle pain sore throat, fatigue, runny or stuffy nose, and loss of taste and smell. Other less common symptoms include nausea, vomiting, or diarrhea (watery stools).    Know when to call 911. Emergency warning signs include:    Trouble breathing or shortness of breath    Pain or pressure in the chest that doesn't go away    Feeling confused like you haven't felt before, or not being able to wake up    Bluish-colored lips or face    How can I take care of myself?  1. Get lots of rest. Drink extra fluids (unless a doctor has told you not to).  2. Take Tylenol (acetaminophen) for fever or pain. If you have liver or kidney problems, ask your family doctor if it's okay to take Tylenol   Adults:   650 mg (two 325 mg pills or tablets) " every 4 to 6 hours, or...   1,000 mg (two 500 mg pills or tablets) every 8 hours as needed.  Note: Don't take more than 3,000 mg in one day. Acetaminophen is found in many medicines (both prescribed and over the counter). Read all labels to be sure you don't take too much.  For children, check the Tylenol bottle for the right dose. The dose is based on the child's age or weight.  3. Take over the counter medicines for your symptoms as needed. Talk to your pharmacist.  4. If you have other health problems (like cancer, heart failure, an organ transplant, or severe kidney disease): Call your specialty clinic if you don't feel better in the next 2 days.    These guidelines are for isolating and quarantining before returning to work, school or .     For employers, schools and day cares: This is an official notice for this person s medical guidelines for returning in-person.     For health care sites: The CDC gives different isolation and quarantine guidelines for healthcare sites, please check with these sites before arriving.     How do I self-isolate?  You isolate when you have symptoms of COVID or a test shows you have COVID, even if you don t have symptoms.     If you DO have symptoms:  o Stay home and away from others  - For at least 5 days after your symptoms started, AND   - You are fever free for 24 hours (with no medicine that reduces fever), AND  - Your other symptoms are better.  o Wear a mask for 10 full days any time you are around others.    If you DON T have symptoms:  o Stay at home and away from others for at least 5 days after your positive test.  o Wear a mask for 10 full days any time you are around others.    How and when do I quarantine?  You quarantine when you may have been exposed to the virus and DON T have symptoms.     Stay home and away from others.     You must quarantine for 5 days after your last contact with a person who has COVID.  o Note: If you are fully vaccinated, you don t  need to quarantine. You should still follow the steps below.     Wear a mask for 10 full days any time you re around others.    Get tested at least 5 days after you were exposed, even if you don t have symptoms.     If you start to have symptoms, isolate right away and get tested.    Where can I get more information?    [facility] COVID-19 Resource Hub: [email or web address]/covid19/     CDC Quarantine & Isolation: https://www.cdc.gov/coronavirus/2019-ncov/your-health/quarantine-isolation.html     CDC - What to Do If You're Sick: https://www.cdc.gov/coronavirus/2019-ncov/if-you-are-sick/index.html    [facility] clinical trials (COVID-19 research studies): [email or web address].[email or web address]/umn-clinical-trials    [place] Department of Health COVID-19 Public Hotline: 1-940.969.6779

## 2022-05-18 NOTE — LETTER
May 18, 2022      Gonzales Melo  [address] [address]        To Whom It May Concern,      Mr. Melo is a patient under my care. He is considered immunocompromised and at higher risk for severe infection(s) as a result.          Sincerely,        Tianna Lam PA-C

## 2022-05-18 NOTE — PROGRESS NOTES
Sylvain is a 51 year old who is being evaluated via a billable video visit.      How would you like to obtain your AVS? MyChart  If the video visit is dropped, the invitation should be resent by: Text to cell phone: [phone]  Will anyone else be joining your video visit? No      Video Start Time: 1010    Assessment & Plan       ICD-10-CM    1. Infection due to 2019 novel coronavirus  U07.1 nirmatrelvir and ritonavir (PAXLOVID) therapy pack   2. Rheumatoid arthritis, involving unspecified site, unspecified whether rheumatoid factor present (H)  M06.9    3. Immunosuppression due to drug therapy (H)  D84.821     Z79.899    4. Morbid obesity (H)  E66.01      Patient on day 3 of COVID-19 infection; at high risk for severe infection due to the above co-morbidities. Candidate for PAXLOVID, no significant drug interactions, normal renal function per labs from 4/2022. Take PAXLOVID as prescribed. Continue supportive cares, get plenty of rest and fluids. Letter generated for employer as requested.    Return in about 1 week (around 5/25/2022), or if symptoms worsen or fail to improve.    [facility]    Chandana   Sylvain is a 51 year old who presents for the following health issues     HPI       COVID-19 Symptom Review  How many days ago did these symptoms start? Sunday night, test POS on Monday 5/16    Are any of the following symptoms significant for you?    New or worsening difficulty breathing? No    Worsening cough? Yes, I am coughing up mucus.    Fever or chills? Yes, I felt feverish or had chills.    Headache: YES    Sore throat: YES    Chest pain: YES- only when coughing    Diarrhea: no    Body aches? YES    What treatments has patient tried? Tyl and Ibu, inhaler, Alevel   Does patient live in a nursing home, group home, or shelter? no  Does patient have a way to get food/medications during quarantined? Yes, I have a friend or family member who can help me.    Needs  letter for employer stating he is immunocompromised     Review of Systems   Constitutional, HEENT, cardiovascular, pulmonary, GI, , musculoskeletal, neuro, skin, endocrine and psych systems are negative, except as otherwise noted.      Objective           Vitals:  No vitals were obtained today due to virtual visit.    Physical Exam   GENERAL: Healthy, alert and no distress  EYES: Eyes grossly normal to inspection.  No discharge or erythema, or obvious scleral/conjunctival abnormalities.  HENT: Normal cephalic/atraumatic.  External ears, nose and mouth without ulcers or lesions.  No nasal drainage visible.  NECK: No asymmetry, visible masses or scars  RESP: No audible wheeze, cough, or visible cyanosis.  No visible retractions or increased work of breathing.    MS: No gross musculoskeletal defects noted.  Normal range of motion.  No visible edema.  SKIN: Visible skin clear. No significant rash, abnormal pigmentation or lesions.  PSYCH: Mentation appears normal, affect normal/bright, judgement and insight intact, normal speech and appearance well-groomed.                Video-Visit Details    Type of service:  Video Visit    Video End Time:1018    Originating Location (pt. Location): Home    Distant Location (provider location):  [facility]     Platform used for Video Visit: 48domain

## 2022-06-06 ENCOUNTER — APPOINTMENT (OUTPATIENT)
Dept: MRI IMAGING | Facility: CLINIC | Age: 52
End: 2022-06-06
Attending: PHYSICIAN ASSISTANT
Payer: COMMERCIAL

## 2022-06-06 ENCOUNTER — APPOINTMENT (OUTPATIENT)
Dept: CT IMAGING | Facility: CLINIC | Age: 52
End: 2022-06-06
Attending: PHYSICIAN ASSISTANT
Payer: COMMERCIAL

## 2022-06-06 ENCOUNTER — HOSPITAL ENCOUNTER (EMERGENCY)
Facility: CLINIC | Age: 52
Discharge: HOME | End: 2022-06-06
Attending: PHYSICIAN ASSISTANT | Admitting: PHYSICIAN ASSISTANT
Payer: COMMERCIAL

## 2022-06-06 VITALS
BODY MASS INDEX: 40.43 KG/M2 | HEART RATE: 106 BPM | HEIGHT: 74 IN | RESPIRATION RATE: 16 BRPM | OXYGEN SATURATION: 96 % | SYSTOLIC BLOOD PRESSURE: 137 MMHG | TEMPERATURE: 99.2 F | WEIGHT: 315 LBS | DIASTOLIC BLOOD PRESSURE: 91 MMHG

## 2022-06-06 DIAGNOSIS — E78.2 ELEVATED TRIGLYCERIDES WITH HIGH CHOLESTEROL: ICD-10-CM

## 2022-06-06 DIAGNOSIS — J32.9 SINUSITIS, UNSPECIFIED CHRONICITY, UNSPECIFIED LOCATION: ICD-10-CM

## 2022-06-06 DIAGNOSIS — G45.9 TIA (TRANSIENT ISCHEMIC ATTACK): Primary | ICD-10-CM

## 2022-06-06 LAB
ANION GAP SERPL CALCULATED.3IONS-SCNC: 5 MMOL/L (ref 3–14)
ATRIAL RATE - MUSE: 96 BPM
BASOPHILS # BLD AUTO: 0.1 10E3/UL (ref 0–0.2)
BASOPHILS NFR BLD AUTO: 1 %
BUN SERPL-MCNC: 18 MG/DL (ref 7–30)
CALCIUM SERPL-MCNC: 8.6 MG/DL (ref 8.5–10.1)
CHLORIDE BLD-SCNC: 107 MMOL/L (ref 94–109)
CHOLEST SERPL-MCNC: 232 MG/DL
CO2 SERPL-SCNC: 26 MMOL/L (ref 20–32)
CREAT SERPL-MCNC: 0.86 MG/DL (ref 0.66–1.25)
DIASTOLIC BLOOD PRESSURE - MUSE: NORMAL MMHG
EOSINOPHIL # BLD AUTO: 0.2 10E3/UL (ref 0–0.7)
EOSINOPHIL NFR BLD AUTO: 3 %
ERYTHROCYTE [DISTWIDTH] IN BLOOD BY AUTOMATED COUNT: 14.6 % (ref 10–15)
GFR SERPL CREATININE-BSD FRML MDRD: >90 ML/MIN/1.73M2
GLUCOSE BLD-MCNC: 104 MG/DL (ref 70–99)
HBA1C MFR BLD: 6.1 % (ref 0–5.6)
HCT VFR BLD AUTO: 43.5 % (ref 40–53)
HDLC SERPL-MCNC: 40 MG/DL
HGB BLD-MCNC: 14.4 G/DL (ref 13.3–17.7)
HOLD SPECIMEN: NORMAL
IMM GRANULOCYTES # BLD: 0 10E3/UL
IMM GRANULOCYTES NFR BLD: 1 %
INTERPRETATION ECG - MUSE: NORMAL
LDLC SERPL CALC-MCNC: 131 MG/DL
LYMPHOCYTES # BLD AUTO: 2.7 10E3/UL (ref 0.8–5.3)
LYMPHOCYTES NFR BLD AUTO: 35 %
MCH RBC QN AUTO: 30.7 PG (ref 26.5–33)
MCHC RBC AUTO-ENTMCNC: 33.1 G/DL (ref 31.5–36.5)
MCV RBC AUTO: 93 FL (ref 78–100)
MONOCYTES # BLD AUTO: 1 10E3/UL (ref 0–1.3)
MONOCYTES NFR BLD AUTO: 13 %
NEUTROPHILS # BLD AUTO: 3.7 10E3/UL (ref 1.6–8.3)
NEUTROPHILS NFR BLD AUTO: 47 %
NONHDLC SERPL-MCNC: 192 MG/DL
NRBC # BLD AUTO: 0 10E3/UL
NRBC BLD AUTO-RTO: 0 /100
P AXIS - MUSE: 52 DEGREES
PLATELET # BLD AUTO: 342 10E3/UL (ref 150–450)
POTASSIUM BLD-SCNC: 4.1 MMOL/L (ref 3.4–5.3)
PR INTERVAL - MUSE: 144 MS
QRS DURATION - MUSE: 94 MS
QT - MUSE: 374 MS
QTC - MUSE: 472 MS
R AXIS - MUSE: 33 DEGREES
RBC # BLD AUTO: 4.69 10E6/UL (ref 4.4–5.9)
SODIUM SERPL-SCNC: 138 MMOL/L (ref 133–144)
SYSTOLIC BLOOD PRESSURE - MUSE: NORMAL MMHG
T AXIS - MUSE: 19 DEGREES
TRIGL SERPL-MCNC: 307 MG/DL
VENTRICULAR RATE- MUSE: 96 BPM
WBC # BLD AUTO: 7.7 10E3/UL (ref 4–11)

## 2022-06-06 PROCEDURE — 250N000009 HC RX 250: Performed by: PHYSICIAN ASSISTANT

## 2022-06-06 PROCEDURE — 83036 HEMOGLOBIN GLYCOSYLATED A1C: CPT | Performed by: NURSE PRACTITIONER

## 2022-06-06 PROCEDURE — 80048 BASIC METABOLIC PNL TOTAL CA: CPT | Performed by: PHYSICIAN ASSISTANT

## 2022-06-06 PROCEDURE — 70450 CT HEAD/BRAIN W/O DYE: CPT | Mod: XU

## 2022-06-06 PROCEDURE — 255N000002 HC RX 255 OP 636: Performed by: PHYSICIAN ASSISTANT

## 2022-06-06 PROCEDURE — 70553 MRI BRAIN STEM W/O & W/DYE: CPT

## 2022-06-06 PROCEDURE — 36415 COLL VENOUS BLD VENIPUNCTURE: CPT | Performed by: PHYSICIAN ASSISTANT

## 2022-06-06 PROCEDURE — A9585 GADOBUTROL INJECTION: HCPCS | Performed by: PHYSICIAN ASSISTANT

## 2022-06-06 PROCEDURE — 80061 LIPID PANEL: CPT | Performed by: NURSE PRACTITIONER

## 2022-06-06 PROCEDURE — 250N000011 HC RX IP 250 OP 636: Performed by: PHYSICIAN ASSISTANT

## 2022-06-06 PROCEDURE — 99285 EMERGENCY DEPT VISIT HI MDM: CPT | Mod: 25

## 2022-06-06 PROCEDURE — 70498 CT ANGIOGRAPHY NECK: CPT

## 2022-06-06 PROCEDURE — 250N000013 HC RX MED GY IP 250 OP 250 PS 637: Performed by: NURSE PRACTITIONER

## 2022-06-06 PROCEDURE — 70496 CT ANGIOGRAPHY HEAD: CPT

## 2022-06-06 PROCEDURE — 85025 COMPLETE CBC W/AUTO DIFF WBC: CPT | Performed by: PHYSICIAN ASSISTANT

## 2022-06-06 PROCEDURE — 93005 ELECTROCARDIOGRAM TRACING: CPT

## 2022-06-06 RX ORDER — ATORVASTATIN CALCIUM 40 MG/1
40 TABLET, FILM COATED ORAL AT BEDTIME
Qty: 30 TABLET | Refills: 0 | Status: SHIPPED | OUTPATIENT
Start: 2022-06-06 | End: 2022-07-07

## 2022-06-06 RX ORDER — ATORVASTATIN CALCIUM 40 MG/1
40 TABLET, FILM COATED ORAL AT BEDTIME
Qty: 30 TABLET | Refills: 0 | Status: SHIPPED | OUTPATIENT
Start: 2022-06-06 | End: 2022-06-06

## 2022-06-06 RX ORDER — CLOPIDOGREL BISULFATE 75 MG/1
75 TABLET ORAL DAILY
Qty: 21 TABLET | Refills: 0 | Status: SHIPPED | OUTPATIENT
Start: 2022-06-06 | End: 2022-07-27

## 2022-06-06 RX ORDER — CLOPIDOGREL BISULFATE 75 MG/1
75 TABLET ORAL DAILY
Qty: 21 TABLET | Refills: 0 | Status: SHIPPED | OUTPATIENT
Start: 2022-06-07 | End: 2022-06-06 | Stop reason: ALTCHOICE

## 2022-06-06 RX ORDER — ASPIRIN 81 MG/1
81 TABLET ORAL DAILY
Qty: 30 TABLET | Refills: 0 | Status: SHIPPED | OUTPATIENT
Start: 2022-06-07 | End: 2022-06-06 | Stop reason: ALTCHOICE

## 2022-06-06 RX ORDER — GADOBUTROL 604.72 MG/ML
15 INJECTION INTRAVENOUS ONCE
Status: COMPLETED | OUTPATIENT
Start: 2022-06-06 | End: 2022-06-06

## 2022-06-06 RX ORDER — CLOPIDOGREL 300 MG/1
300 TABLET, FILM COATED ORAL ONCE
Status: COMPLETED | OUTPATIENT
Start: 2022-06-06 | End: 2022-06-06

## 2022-06-06 RX ORDER — IOPAMIDOL 755 MG/ML
75 INJECTION, SOLUTION INTRAVASCULAR ONCE
Status: COMPLETED | OUTPATIENT
Start: 2022-06-06 | End: 2022-06-06

## 2022-06-06 RX ADMIN — ASPIRIN 325 MG: 325 TABLET, COATED ORAL at 16:31

## 2022-06-06 RX ADMIN — GADOBUTROL 15 ML: 604.72 INJECTION INTRAVENOUS at 16:00

## 2022-06-06 RX ADMIN — CLOPIDOGREL BISULFATE 300 MG: 300 TABLET, FILM COATED ORAL at 16:31

## 2022-06-06 RX ADMIN — IOPAMIDOL 75 ML: 755 INJECTION, SOLUTION INTRAVENOUS at 13:12

## 2022-06-06 RX ADMIN — SODIUM CHLORIDE 100 ML: 900 INJECTION INTRAVENOUS at 13:13

## 2022-06-06 ASSESSMENT — ENCOUNTER SYMPTOMS
DIZZINESS: 0
EYE PAIN: 0
HEADACHES: 0
PHOTOPHOBIA: 0

## 2022-06-06 NOTE — CONSULTS
"    [facility]    Stroke Telephone Note    I was called by Shon Valencia PA-C on 06/06/22 regarding patient Gonzales Melo. The patient is a 51 year old male with hx of asthma and obesity. He [facility] on 6/6/2022 with L eye visual changes. At 10:30 AM today, patient was working on his computer when he had sudden \"waves\" in his L eye only that he could not see through, this lasted 20 minutes. Currently his L eye vision is now dim. Initial ED BP: 145/91    Imaging Findings   CT head: pending  CTA head/neck: pending    Impression  51 year old male with L eye visual changes, possibly CRAO vs TIA    Recommendations    - STAT CT head, STAT CTA head/neck   - MRI brain with and without, repage stroke neuro after imaging has resulted       ADDENDUM:  Imaging resulted, shows no acute infarct. Symptoms have now completely resolved in ED. Symptoms likely TIA. Patient can follow up in TIA clinic.  ED TIA pathway orders placed.      MRI brain:  1. No acute intracranial process.  2. Left sphenoid sinus presumed inflammatory disease    HEAD CT:  1.  Normal head CT.  2.  No mass hemorrhage or stroke.  3.  Acute and chronic sphenoid sinusitis.     HEAD CTA:  1.  Normal head CTA.  2.  No significant stenosis.  No aneurysm or vascular malformation.     NECK CTA:  1.  Normal neck CTA  for age.  2.  No significant stenosis as per NASCET criteria.    Recommendations:  - Give  mg x 1 now (ordered)  - Give Plavix 300 mg x 1 now (ordered)  - Continue ASA 81 mg daily   - Continue Plavix 75 mg daily x 21 days   - Continue Lipitor 40 mg daily, LDL pending   - ECHO outpatient   - 30 day heart monitor to be placed outpatient     My recommendations are based on the information provided over the phone by Gonzales Melo's in-person providers. They are not intended to replace the clinical judgment of his in-person providers. I was not requested to personally see or examine the patient at this " "time.    JED Brizuela, CNP  Vascular Neurology  To page me or covering stroke neurology team member, click here: AMCOM   Choose \"On Call\" tab at top, then search dropdown box for \"Neurology Adult\", select location, press Enter, then look for stroke/neuro ICU/telestroke.         "

## 2022-06-06 NOTE — ED PROVIDER NOTES
History     Chief Complaint:   vision change       HPI   Gonzales Melo is a 51 year old male with recent COVID diagnosis COVID, presents emergency room today for evaluation of visual changes out of his left eye.  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  He had no other symptoms.  His symptoms lasted for about 20 minutes.  He denies any difficulty in the periphery.  He has no weakness, numbness, tingling, changes in hearing, headache, or other at this time.  Currently, the patient reports that his vision in his left eye just seems more dim and not as bright as the right eye.  He has no pain.    ROS:  Review of Systems   Eyes: Positive for visual disturbance. Negative for photophobia and pain.   Neurological: Negative for dizziness and headaches.      All other systems reviewed and are negative.    Allergies:  Mucinex     Medications:    amoxicillin-clavulanate (AUGMENTIN) 875-125 MG tablet  aspirin (ASA) 81 MG EC tablet  atorvastatin (LIPITOR) 40 MG tablet  clopidogrel (PLAVIX) 75 MG tablet  adalimumab (HUMIRA *CF*) 40 MG/0.4ML prefilled syringe kit  albuterol (PROAIR HFA/PROVENTIL HFA/VENTOLIN HFA) 108 (90 Base) MCG/ACT inhaler  ipratropium - albuterol 0.5 mg/2.5 mg/3 mL (DUONEB) 0.5-2.5 (3) MG/3ML neb solution  methotrexate sodium 2.5 MG TABS        Past Medical History:    Past Medical History:   Diagnosis Date     Arthritis      Asthma, mild intermittent      Obesity 1/30/2014     Plantar fasciitis 1/30/2014     Patient Active Problem List   Diagnosis     Asthma, mild intermittent     CARDIOVASCULAR SCREENING; LDL GOAL LESS THAN 160     Obesity     Hypertriglyceridemia     Plantar fasciitis     Morbid obesity (H)     Pes planus of both feet     RA (rheumatoid arthritis) (H)        Past Surgical History:  "   Past Surgical History:   Procedure Laterality Date     COLONOSCOPY  9/30/2015    Dr. Son [facility]     COLONOSCOPY N/A 9/30/2015    Procedure: COLONOSCOPY;  Surgeon: Marvin Son MD;  Location:  GI        Family History:    family history includes Family History Negative in his mother; Heart Disease in his brother; Hypertension in his maternal grandmother; Rheumatoid Arthritis in his maternal grandmother.    Social History:   reports that he has never smoked. He has never used smokeless tobacco. He reports that he does not drink alcohol and does not use drugs.  PCP: Kandy Duff     Physical Exam     Patient Vitals for the past 24 hrs:   BP Temp Temp src Pulse Resp SpO2 Height Weight   06/06/22 1230 (!) 137/91 -- -- -- -- 96 % -- --   06/06/22 1156 (!) 145/91 99.2  F (37.3  C) Oral 106 16 99 % 1.88 m (6' 2\") (!) 154.2 kg (340 lb)        Physical Exam  General: Well appearing, pleasant male, resting on exam bed  HEENT: No evidence of trauma.  Conjunctive are clear.  Extraocular eye movements intact.  Neck range of motion intact.  Nose and throat clear.  Respiratory: Good breath sounds bilaterally  Cardiovascular: Fast rate and rhythm   Gastrointestinal: Soft, nontender.   Musculoskeletal: Atraumatic  Skin: Exposed skin clear.  Neurologic: Alert and oriented x4.  Gross sensation intact.  Proximal and distal motor strength of the upper and lower extremities intact.  Cerebellar function test intact.  Cranial nerves II through XII intact.  He is alert, answers questions, follows commands, has normal gaze and visual fields without any facial palsy.  His upper and lower extremities have no drift.  He has no limb ataxia, changes in sensation or language, no dysarthria or neglect. Gait normal.  He is able to read the letters on the board without his glasses with each eye.  Psych:  Patient is cooperative, with normal affect.      Emergency Department Course   ECG:  ECG results from 06/06/22   EKG 12-lead, tracing only "     Value    Systolic Blood Pressure     Diastolic Blood Pressure     Ventricular Rate 96    Atrial Rate 96    AL Interval 144    QRS Duration 94        QTc 472    P Axis 52    R AXIS 33    T Axis 19    Interpretation ECG      Sinus rhythm  Normal ECG  No previous ECGs available  Confirmed by GENERATED REPORT, COMPUTER ([id number]),  Kristen Mcknight ([id number]) on 6/6/2022 3:40:06 PM         Imaging:  MR Brain w/o & w Contrast   Final Result   IMPRESSION:   1. No acute intracranial process.   2. Left sphenoid sinus presumed inflammatory disease, as described.       ERNESTO GARCIA MD            SYSTEM ID:  [id number]      CTA Head Neck with Contrast   Final Result   IMPRESSION:   HEAD CT:   1.  Normal head CT.   2.  No mass hemorrhage or stroke.   3.  Acute and chronic sphenoid sinusitis.      HEAD CTA:   1.  Normal head CTA.   2.  No significant stenosis.  No aneurysm or vascular malformation.      NECK CTA:   1.  Normal neck CTA  for age.   2.  No significant stenosis as per NASCET criteria.      Radiation dose for this scan was reduced using automated exposure   control, adjustment of the mA and/or kV according to patient size, or   iterative reconstruction technique      HUGO WAGGONER MD            SYSTEM ID:  [id number]      Head CT w/o contrast   Final Result   IMPRESSION:   HEAD CT:   1.  Normal head CT.   2.  No mass hemorrhage or stroke.   3.  Acute and chronic sphenoid sinusitis.      HEAD CTA:   1.  Normal head CTA.   2.  No significant stenosis.  No aneurysm or vascular malformation.      NECK CTA:   1.  Normal neck CTA  for age.   2.  No significant stenosis as per NASCET criteria.      Radiation dose for this scan was reduced using automated exposure   control, adjustment of the mA and/or kV according to patient size, or   iterative reconstruction technique      HUGO WAGGONER MD            SYSTEM ID:  [id number]      Cardiac Event Monitor Adult Pediatric    (Results Pending)   Echocardiogram Complete -  "For age <= 60 yrs    (Results Pending)        Laboratory:  Labs Ordered and Resulted from Time of ED Arrival to Time of ED Departure   BASIC METABOLIC PANEL - Abnormal       Result Value    Sodium 138      Potassium 4.1      Chloride 107      Carbon Dioxide (CO2) 26      Anion Gap 5      Urea Nitrogen 18      Creatinine 0.86      Calcium 8.6      Glucose 104 (*)     GFR Estimate >90     LIPID REFLEX TO DIRECT LDL PANEL - Abnormal    Cholesterol 232 (*)     Triglycerides 307 (*)     Direct Measure HDL 40      LDL Cholesterol Calculated 131 (*)     Non HDL Cholesterol 192 (*)    CBC WITH PLATELETS AND DIFFERENTIAL    WBC Count 7.7      RBC Count 4.69      Hemoglobin 14.4      Hematocrit 43.5      MCV 93      MCH 30.7      MCHC 33.1      RDW 14.6      Platelet Count 342      % Neutrophils 47      % Lymphocytes 35      % Monocytes 13      % Eosinophils 3      % Basophils 1      % Immature Granulocytes 1      NRBCs per 100 WBC 0      Absolute Neutrophils 3.7      Absolute Lymphocytes 2.7      Absolute Monocytes 1.0      Absolute Eosinophils 0.2      Absolute Basophils 0.1      Absolute Immature Granulocytes 0.0      Absolute NRBCs 0.0          Interventions:  Medications   iopamidol (ISOVUE-370) solution 75 mL (75 mLs Intravenous Given 6/6/22 1312)   Saline flush (100 mLs Intravenous Given 6/6/22 1313)   gadobutrol (GADAVIST) injection 15 mL (15 mLs Intravenous Given 6/6/22 1600)   aspirin (ASA) EC tablet 325 mg (325 mg Oral Given 6/6/22 1631)   clopidogrel (PLAVIX) tablet 300 mg (300 mg Oral Given 6/6/22 1631)        Impression & Plan      Medical Decision Making:  Gonzales Melo is a 51 year old male presents emergency room today for evaluation of visual changes.  See HPI.  He is tachycardic and mildly hypertensive.  He has a reassuring neurological exam and is in no acute distress.  Currently with me, he just complains of a \"dim\" visual field out of the left eye.  I talked with Dr. Delgado of stroke neurology about the " patient to see if this sounded like stroke.  Since his symptoms were almost completely improved, with no disabling deficits, within normal exam, he was not a tPA candidate.  We sent him for a CT of his head with angiography that was unremarkable aside from some sinusitis.  I talked with Dr. Pérez of stroke neurology, Dr. Delgado's colleague, after the CT results.  He recommended an MRI.  This was unremarkable thankfully.  Upon reevaluation after the CT, the patient had no symptoms and was back to normal.  We will manage him as a TIA as an outpatient.  He is loaded with aspirin and Plavix.  Aspirin and Plavix sent to the pharmacy.  Side effects discussed.  His lipids were high.  A1c pending.  Atorvastatin also prescribed.  The stroke neurology team will arrange follow-up.  See discharge orders.  I ordered him some Augmentin also for his sinusitis.  He is to return with absolutely any new or worsening symptoms.  No further questions and agrees with plan.  Discharge home with wife.  Unlikely ocular etiology such as vitreous or retinal detachment given that his symptoms completely improved.  Could be a variation of a migraine also but he never had a headache during his time with us.  Nonetheless, stable, back to baseline, follow-up with TIA clinic, and return if worse.  There is an interaction between his methotrexate and aspirin saying that this could result in some liver or bone toxicity.  I encouraged him to follow-up with his rheumatologist regarding this for further monitoring.    Diagnosis:    ICD-10-CM    1. TIA (transient ischemic attack)  G45.9 TIA Follow-Up     Cardiac Event Monitor Adult Pediatric     Echocardiogram Complete - For age <= 60 yrs     atorvastatin (LIPITOR) 40 MG tablet     DISCONTINUED: aspirin 81 MG EC tablet     DISCONTINUED: clopidogrel (PLAVIX) 75 MG tablet     DISCONTINUED: atorvastatin (LIPITOR) 40 MG tablet   2. Sinusitis, unspecified chronicity, unspecified location  J32.9    3. Elevated  triglycerides with high cholesterol  E78.2         Discharge Medications:  Discharge Medication List as of 6/6/2022  5:08 PM      START taking these medications    Details   amoxicillin-clavulanate (AUGMENTIN) 875-125 MG tablet Take 1 tablet by mouth 2 times daily for 10 days, Disp-20 tablet, R-0, E-Prescribe      aspirin (ASA) 81 MG EC tablet Take 1 tablet (81 mg) by mouth daily, Disp-30 tablet, R-0, E-Prescribe      clopidogrel (PLAVIX) 75 MG tablet Take 1 tablet (75 mg) by mouth daily for 21 days, Disp-21 tablet, R-0, E-Prescribe              6/6/2022   Shon Valencia PA-C Cyr, Matthew R, PA-C  06/06/22 0697

## 2022-06-06 NOTE — ED TRIAGE NOTES
"Pt sts at 1030 his left eye had wavy shimmering images could not focus on computer lasted 20 mins. Currently vision is \"dim\"      "

## 2022-06-06 NOTE — DISCHARGE INSTRUCTIONS
Discharge Instructions  Sinus Infection    You have acute sinusitis, or an infection of the sinuses. The sinuses are the hollow areas within the facial bones that are connected to the nasal opening. The most common cause of acute sinusitis is a virus infection associated with the common cold. Bacterial sinusitis occurs much less commonly, usually as a complication of viral sinusitis. Experts say that most sinusitis is caused by a virus within the first 7-10 days of illness. Antibiotics do nothing to help with virus infections, so most people do not need antibiotics for acute sinusitis.     Generally, every Emergency Department visit should have a follow-up clinic visit with either a primary or a specialty clinic/provider. Please follow-up as instructed by your emergency provider today.    Return to the Emergency Department if:  Your vision changes.  You are confused or have difficulty thinking clearly.  You have swelling around your eye.  You develop a severe headache or neck stiffness.  Your symptoms get worse and you are unable to see your primary provider.      Treatment:  Pain relief -- Non-prescription pain medications, such as Tylenol  (acetaminophen) or Motrin  or Advil  (ibuprofen) are recommended for pain.  Do not use a medicine that you are allergic to, or if your provider has told you not to use it.     Nasal irrigation -- Flushing the nose and sinuses with a saline solution several times per day can help to decrease pain caused by congestion.  Nasal decongestants -- Nasal decongestant sprays, including Afrin  (oxymetazoline) and Mikael-Synephrine  (phenylephrine) can be used to temporarily treat congestion. However, these sprays should not be used for more than two to three days due to the risk of rebound congestion (when the nose is congested constantly unless the medication is used repeatedly).  Nasal glucocorticoids -- These are prescription steroids delivered by a nasal spray that can help to reduce  swelling inside the nose, usually within two to three days. These drugs have few side effects and dramatically relieve symptoms in most people.  If you use these in conjunction with Afrin  you will need to use at least 15 minutes prior to the nasal decongestant.    Antibiotic? -- Rarely antibiotics are used along with the above treatments.    If you were given a prescription for medicine here today, be sure to read all of the information (including the package insert) that comes with your prescription.  This will include important information about the medicine, its side effects, and any warnings that you need to know about.  The pharmacist who fills the prescription can provide more information and answer questions you may have about the medicine.  If you have questions or concerns that the pharmacist cannot address, please call or return to the Emergency Department.   Remember that you can always come back to the Emergency Department if you are not able to see your regular provider in the amount of time listed above, if you get any new symptoms, or if there is anything that worries you.

## 2022-06-07 ENCOUNTER — PATIENT OUTREACH (OUTPATIENT)
Dept: NEUROLOGY | Facility: CLINIC | Age: 52
End: 2022-06-07
Payer: COMMERCIAL

## 2022-06-07 ENCOUNTER — PATIENT OUTREACH (OUTPATIENT)
Dept: CARE COORDINATION | Facility: CLINIC | Age: 52
End: 2022-06-07
Payer: COMMERCIAL

## 2022-06-07 DIAGNOSIS — Z71.89 OTHER SPECIFIED COUNSELING: ICD-10-CM

## 2022-06-07 NOTE — PROGRESS NOTES
ED TIA Pathway patient. Chart reviewed    Neurology phone note while in the hospital: YES    Head CT or Brain MRI completed:YES    Head and Neck Vessel Imaging completed (MRA Head/Neck or CTA Head/Neck):YES    Patient scheduled for Stroke DAVID Virtual appointment TBD- awaiting provider response    Please  Assist with scheduling Echocardiogram and Cardiac Event Monitor     MARGE COHEN CMA

## 2022-06-07 NOTE — PROGRESS NOTES
Clinic Care Coordination Contact  [facility]/Voicemail       Clinical Data: Care Coordinator Outreach  Outreach attempted x 1.  Left message on patient's voicemail with call back information and requested return call.  Plan:  Care Coordinator will try to reach patient again in 1-2 business days.    Shawanda Tan  Community Health Worker  [facility]  [phone]

## 2022-06-08 NOTE — PROGRESS NOTES
Clinic Care Coordination Contact  [facility]/Voicemail       Clinical Data: Care Coordinator Outreach  Outreach attempted x 2.  Left message on patient's voicemail with call back information and requested return call.  Plan: Care Coordinator will do no further outreaches at this time.    Shawanda Tan  Community Health Worker  [facility] Care [facility]  [phone]

## 2022-06-08 NOTE — PROGRESS NOTES
Spoke to patient to confirm all upcoming appointments, confirmed patient has started medications prescribed in ER.    MARGE COHEN CMA

## 2022-06-10 ENCOUNTER — HOSPITAL ENCOUNTER (OUTPATIENT)
Dept: CARDIOLOGY | Facility: CLINIC | Age: 52
Discharge: HOME | End: 2022-06-10
Attending: NURSE PRACTITIONER
Payer: COMMERCIAL

## 2022-06-10 DIAGNOSIS — G45.9 TIA (TRANSIENT ISCHEMIC ATTACK): Primary | ICD-10-CM

## 2022-06-10 DIAGNOSIS — G45.9 TIA (TRANSIENT ISCHEMIC ATTACK): ICD-10-CM

## 2022-06-10 LAB — LVEF ECHO: NORMAL

## 2022-06-10 PROCEDURE — 255N000002 HC RX 255 OP 636: Performed by: NURSE PRACTITIONER

## 2022-06-10 PROCEDURE — 93270 REMOTE 30 DAY ECG REV/REPORT: CPT

## 2022-06-10 PROCEDURE — 93306 TTE W/DOPPLER COMPLETE: CPT | Mod: 26 | Performed by: INTERNAL MEDICINE

## 2022-06-10 PROCEDURE — 93272 ECG/REVIEW INTERPRET ONLY: CPT | Performed by: INTERNAL MEDICINE

## 2022-06-10 PROCEDURE — 999N000208 ECHOCARDIOGRAM COMPLETE

## 2022-06-10 RX ADMIN — HUMAN ALBUMIN MICROSPHERES AND PERFLUTREN 9 ML: 10; .22 INJECTION, SOLUTION INTRAVENOUS at 13:43

## 2022-06-14 ENCOUNTER — VIRTUAL VISIT (OUTPATIENT)
Dept: NEUROLOGY | Facility: CLINIC | Age: 52
End: 2022-06-14
Attending: NURSE PRACTITIONER
Payer: COMMERCIAL

## 2022-06-14 DIAGNOSIS — G45.9 TIA (TRANSIENT ISCHEMIC ATTACK): Primary | ICD-10-CM

## 2022-06-14 PROCEDURE — 99205 OFFICE O/P NEW HI 60 MIN: CPT | Mod: 95

## 2022-06-14 NOTE — LETTER
"    6/14/2022         RE: Gonzales Melo  [address]  [place] [address]        Dear Colleague,    Thank you for referring your patient, Gonzales Melo, to the [facility] NEUROLOGY [facility]. Please see a copy of my visit note below.    Sylvain is a 51 year old who is being evaluated via a billable telephone visit.      What phone number would you like to be contacted at? [phone]  How would you like to obtain your AVS? MyChart  Phone call duration: 20 minutes    __________________________________________________________      [facility]   [phone]  __________________________________________________________    Chief Complaint: Patient presents with:  Transient Ischemic Attack      History of Present Illness: Gonzales Melo is a 51 year old male presenting for follow-up for TIA.     He was hospitalized at [facility] on 6/6/2014. Prior to the hospital stay, he had a past medical history of obesity, rheumatoid arthritis, CHEPE (on CPAP HS) and asthma.     He presented to the hospital with L eye visual changes. The day of presentation patient was working on his computer when he had sudden \"waves\" in his L eye only that he could not see through, this lasted 20 minutes. Then his vision in his left eye went dim. After patient completed imaging, all symptoms had resolved. He stated episode lasted from 10:30 AM to 1:30 PM that day.     Stroke Evaluation summarized:  MRI brain: No acute intracranial process, left sphenoid sinus presumed inflammatory disease  Intracranial Vascular Imaging: CTA head: Normal head CTA, no significant stenosis.  No aneurysm or vascular malformation  Cervical Carotid and Vertebral Artery Vascular Imaging:  CTA neck: Normal neck CTA  for age. No significant stenosis   Echocardiogram: EF 55-60%. LV normal in size, borderline concentric LVH, RV normal, LA normal, RA normal, bubble study negative    EKG/Telemetry: " SR  LDL: 131  A1c: 6.1 %      He was started on aspirin 81 mg daily + Plavix 75 mg daily for 21 days for stroke prevention. He also had his 30 day cardiac monitor placed to rule out atrial fibrillation.  Since being discharged, Sylvain states he has been feeling great and was able to work the following day. He did mention that he recently started Humira for his RA and was wondering if the symptoms could be related to that medication. I encouraged Sylvain to reach out to his rheumatologist to discuss if this is a common symptom of Humira.    We discussed continuing the ASA + Plavix for 21 days then continue  mg daily until follow up visit with general neurology colleagues. We also discussed follow up with opthalmology as well.     Modified Shawna Scale  Score: 0-No symptoms    Impression:   Problem List Items Addressed This Visit    None     Visit Diagnoses     TIA (transient ischemic attack)    -  Primary    Relevant Orders    Adult Neurology  Referral    Adult Eye Referral        51 year old male with 3 hour episode of left eye visual changes, symptoms likely TIA     Plan:   - Continue aspirin 81 mg daily + Plavix 75 mg daily for 21 days     -- After 21 days (approx June 30, 2022), switch to aspirin 325 mg daily   - Continue Lipitor 40 mg daily, goal LDL 40-70, <40 increases risk of ICH, titrate outpatient with PCP  - Follow up with ophthalmology (referral placed)  - Follow up with general neurology in 6-8 weeks, after 30 day heart monitor results (referral placed)   - Continue using CPAP for CHEPE  - Discussed Mediterranean diet and dietary changes  - Long term BP goal normotension <130/80 or lower for overall cardiovascular health    Stroke Education provided.  He will call us with any questions.  For any acute neurologic deficits he was advised to  go directly to the hospital rather than call the clinic.    JED Bean [facility]  Neurology  06/14/2022 2:59 PM  To page me or covering stroke  "neurology team member, click here: AMCOM  Choose \"On Call\" tab at top, then search dropdown box for \"Neurology Adult\" & press Enter, look for Neuro ICU/Stroke    ___________________________________________________________________    Current Medications  Current Outpatient Medications   Medication Sig     adalimumab (HUMIRA *CF*) 40 MG/0.4ML prefilled syringe kit Inject 0.4 mLs (40 mg) Subcutaneous every 14 days     albuterol (PROAIR HFA/PROVENTIL HFA/VENTOLIN HFA) 108 (90 Base) MCG/ACT inhaler Inhale 1-2 puffs into the lungs every 4 hours as needed for shortness of breath / dyspnea or wheezing     amoxicillin-clavulanate (AUGMENTIN) 875-125 MG tablet Take 1 tablet by mouth 2 times daily for 10 days     aspirin (ASA) 81 MG EC tablet Take 1 tablet (81 mg) by mouth daily     atorvastatin (LIPITOR) 40 MG tablet Take 1 tablet (40 mg) by mouth At Bedtime     clopidogrel (PLAVIX) 75 MG tablet Take 1 tablet (75 mg) by mouth daily for 21 days     ipratropium - albuterol 0.5 mg/2.5 mg/3 mL (DUONEB) 0.5-2.5 (3) MG/3ML neb solution Take 1 vial (3 mLs) by nebulization every 6 hours as needed for shortness of breath / dyspnea     methotrexate sodium 2.5 MG TABS Take 10 tablets (25 mg) by mouth every 7 days     No current facility-administered medications for this visit.       Past Medical History  Past Medical History:   Diagnosis Date     Arthritis     RA     Asthma, mild intermittent      Obesity 1/30/2014     Plantar fasciitis 1/30/2014       Social History  Social History     Tobacco Use     Smoking status: Never Smoker     Smokeless tobacco: Never Used   Vaping Use     Vaping Use: Never used   Substance Use Topics     Alcohol use: No     Drug use: No       Family History  Family History   Problem Relation Age of Onset     Family History Negative Mother      Hypertension Maternal Grandmother      Rheumatoid Arthritis Maternal Grandmother      Heart Disease Brother        Physical Exam    Vitals - Patient Reported 6/14/2022 " "  Weight (Patient Reported) 340 lb             Estimated body mass index is 43.65 kg/m  as calculated from the following:    Height as of 6/6/22: 1.88 m (6' 2\").    Weight as of 6/6/22: 154.2 kg (340 lb).      Neurologic Exam:  Phone-only visit. Speech was clear and fluent     Neuroimaging: as per HPI. I personally reviewed those images    Labs:    Coagulation studies:  No lab results found.     Lipid panel:  Recent Labs   Lab Test 06/06/22  1225 12/23/19  0850   CHOL 232* 237*   HDL 40 35*   * 153*   TRIG 307* 246*       HbA1C:  Recent Labs   Lab Test 06/06/22  1225 12/23/19  0850 01/12/17  0523   A1C 6.1* 5.6 6.0       Troponin:  No lab results found.  No lab results found.  No lab results found.    Billing:    I spent a total of 60 minutes on the day of the visit.   Time spent doing chart review, history and exam, documentation and further activities per the note          Again, thank you for allowing me to participate in the care of your patient.        Sincerely,         NEUROLOGY STROKE    "

## 2022-06-14 NOTE — PROGRESS NOTES
"Sylvain is a 51 year old who is being evaluated via a billable telephone visit.      What phone number would you like to be contacted at? [phone]  How would you like to obtain your AVS? [place]  Phone call duration: 20 minutes    __________________________________________________________      [facility]   [phone]  __________________________________________________________    Chief Complaint: Patient presents with:  Transient Ischemic Attack      History of Present Illness: Gonzales Melo is a 51 year old male presenting for follow-up for TIA.     He was hospitalized at [facility] on 6/6/2014. Prior to the hospital stay, he had a past medical history of obesity, rheumatoid arthritis, CHEPE (on CPAP HS) and asthma.     He presented to the hospital with L eye visual changes. The day of presentation patient was working on his computer when he had sudden \"waves\" in his L eye only that he could not see through, this lasted 20 minutes. Then his vision in his left eye went dim. After patient completed imaging, all symptoms had resolved. He stated episode lasted from 10:30 AM to 1:30 PM that day.     Stroke Evaluation summarized:  MRI brain: No acute intracranial process, left sphenoid sinus presumed inflammatory disease  Intracranial Vascular Imaging: CTA head: Normal head CTA, no significant stenosis.  No aneurysm or vascular malformation  Cervical Carotid and Vertebral Artery Vascular Imaging:  CTA neck: Normal neck CTA  for age. No significant stenosis   Echocardiogram: EF 55-60%. LV normal in size, borderline concentric LVH, RV normal, LA normal, RA normal, bubble study negative    EKG/Telemetry: SR  LDL: 131  A1c: 6.1 %      He was started on aspirin 81 mg daily + Plavix 75 mg daily for 21 days for stroke prevention. He also had his 30 day cardiac monitor placed to rule out atrial fibrillation.  Since being discharged, Sylvain states he has been feeling great and was " "able to work the following day. He did mention that he recently started Humira for his RA and was wondering if the symptoms could be related to that medication. I encouraged Sylvain to reach out to his rheumatologist to discuss if this is a common symptom of Humira.    We discussed continuing the ASA + Plavix for 21 days then continue  mg daily until follow up visit with general neurology colleagues. We also discussed follow up with opthalmology as well.     Modified [place] Scale  Score: 0-No symptoms    Impression:   Problem List Items Addressed This Visit    None     Visit Diagnoses     TIA (transient ischemic attack)    -  Primary    Relevant Orders    Adult Neurology  Referral    Adult Eye Referral        51 year old male with 3 hour episode of left eye visual changes, symptoms likely TIA     Plan:   - Continue aspirin 81 mg daily + Plavix 75 mg daily for 21 days     -- After 21 days (approx June 30, 2022), switch to aspirin 325 mg daily   - Continue Lipitor 40 mg daily, goal LDL 40-70, <40 increases risk of ICH, titrate outpatient with PCP  - Follow up with ophthalmology (referral placed)  - Follow up with general neurology in 6-8 weeks, after 30 day heart monitor results (referral placed)   - Continue using CPAP for CHEPE  - Discussed Mediterranean diet and dietary changes  - Long term BP goal normotension <130/80 or lower for overall cardiovascular health    Stroke Education provided.  He will call us with any questions.  For any acute neurologic deficits he was advised to  go directly to the hospital rather than call the clinic.    JED Bean [facility]  Neurology  06/14/2022 2:59 PM  To page me or covering stroke neurology team member, click here: AMCOM  Choose \"On Call\" tab at top, then search dropdown box for \"Neurology Adult\" & press Enter, look for Neuro ICU/Stroke    ___________________________________________________________________    Current Medications  Current Outpatient " "Medications   Medication Sig     adalimumab (HUMIRA *CF*) 40 MG/0.4ML prefilled syringe kit Inject 0.4 mLs (40 mg) Subcutaneous every 14 days     albuterol (PROAIR HFA/PROVENTIL HFA/VENTOLIN HFA) 108 (90 Base) MCG/ACT inhaler Inhale 1-2 puffs into the lungs every 4 hours as needed for shortness of breath / dyspnea or wheezing     amoxicillin-clavulanate (AUGMENTIN) 875-125 MG tablet Take 1 tablet by mouth 2 times daily for 10 days     aspirin (ASA) 81 MG EC tablet Take 1 tablet (81 mg) by mouth daily     atorvastatin (LIPITOR) 40 MG tablet Take 1 tablet (40 mg) by mouth At Bedtime     clopidogrel (PLAVIX) 75 MG tablet Take 1 tablet (75 mg) by mouth daily for 21 days     ipratropium - albuterol 0.5 mg/2.5 mg/3 mL (DUONEB) 0.5-2.5 (3) MG/3ML neb solution Take 1 vial (3 mLs) by nebulization every 6 hours as needed for shortness of breath / dyspnea     methotrexate sodium 2.5 MG TABS Take 10 tablets (25 mg) by mouth every 7 days     No current facility-administered medications for this visit.       Past Medical History  Past Medical History:   Diagnosis Date     Arthritis     RA     Asthma, mild intermittent      Obesity 1/30/2014     Plantar fasciitis 1/30/2014       Social History  Social History     Tobacco Use     Smoking status: Never Smoker     Smokeless tobacco: Never Used   Vaping Use     Vaping Use: Never used   Substance Use Topics     Alcohol use: No     Drug use: No       Family History  Family History   Problem Relation Age of Onset     Family History Negative Mother      Hypertension Maternal Grandmother      Rheumatoid Arthritis Maternal Grandmother      Heart Disease Brother        Physical Exam    Vitals - Patient Reported 6/14/2022   Weight (Patient Reported) 340 lb             Estimated body mass index is 43.65 kg/m  as calculated from the following:    Height as of 6/6/22: 1.88 m (6' 2\").    Weight as of 6/6/22: 154.2 kg (340 lb).      Neurologic Exam:  Phone-only visit. Speech was clear and fluent "     Neuroimaging: as per HPI. I personally reviewed those images    Labs:    Coagulation studies:  No lab results found.     Lipid panel:  Recent Labs   Lab Test 06/06/22  1225 12/23/19  0850   CHOL 232* 237*   HDL 40 35*   * 153*   TRIG 307* 246*       HbA1C:  Recent Labs   Lab Test 06/06/22  1225 12/23/19  0850 01/12/17  0523   A1C 6.1* 5.6 6.0       Troponin:  No lab results found.  No lab results found.  No lab results found.    Billing:    I spent a total of 60 minutes on the day of the visit.   Time spent doing chart review, history and exam, documentation and further activities per the note

## 2022-06-14 NOTE — PROGRESS NOTES
Sylvain is a 51 year old who is being evaluated via a billable telephone visit.      What phone number would you like to be contacted at? [phone]  How would you like to obtain your AVS? [place]  Phone call duration: *** minutes

## 2022-06-15 ENCOUNTER — OFFICE VISIT (OUTPATIENT)
Dept: OPHTHALMOLOGY | Facility: CLINIC | Age: 52
End: 2022-06-15
Payer: COMMERCIAL

## 2022-06-15 DIAGNOSIS — G45.9 TIA (TRANSIENT ISCHEMIC ATTACK): ICD-10-CM

## 2022-06-15 DIAGNOSIS — H53.9 TRANSIENT VISUAL DISTURBANCE, LEFT: Primary | ICD-10-CM

## 2022-06-15 PROCEDURE — 92004 COMPRE OPH EXAM NEW PT 1/>: CPT | Performed by: OPHTHALMOLOGY

## 2022-06-15 ASSESSMENT — VISUAL ACUITY
OS_CC: 20/20
CORRECTION_TYPE: GLASSES
METHOD: SNELLEN - LINEAR
OD_CC: 20/25

## 2022-06-15 ASSESSMENT — TONOMETRY
OD_IOP_MMHG: 16
IOP_METHOD: APPLANATION
OS_IOP_MMHG: 16

## 2022-06-15 ASSESSMENT — REFRACTION_WEARINGRX
OS_SPHERE: -1.25
OD_SPHERE: -1.00
OS_CYLINDER: SPHERE
OD_CYLINDER: +0.50
OD_AXIS: 070

## 2022-06-15 ASSESSMENT — CONF VISUAL FIELD
OS_NORMAL: 1
OD_NORMAL: 1

## 2022-06-15 NOTE — LETTER
6/15/2022         RE: Gonzales Melo  [address]  [place] [address]        Dear Colleague,    Thank you for referring your patient, Gonzales Melo, to the [facility]. Please see a copy of my visit note below.    Current Eye Medications:  None    Subjective:  Her for evaluation of eyes/vision after TIA.  On 06/06, he notes on the left eye he was having a visual disturbance. Opaque and wavy spot in the center vision on the left eye. Started at 10:30am to 1:45pm.  He was then seen at ER and found to have a TIA.   The visual disturbance has not returned. Vision is back to normal.   He was reading up on Humira and the side effects. Wandering if Humira caused the vision issue.   He is wearing glasses single vision for distance only.    Normal brain MRI, carotid study, heart echo with bubble test.    TORI Yuan  11:06 AM 06/15/2022    Objective:  See Ophthalmology Exam.      Assessment:  Transient visual disturbance of indeterminate etiology.     Plan:  No ocular findings of concern.  Possible retinal or visual tract TIA versus migraine equivalent.  Reasonable to consider continuing 81 mg ASA indefinitely (defer to Neurology)  Call or return visit if new symptoms occur.  Gustabo Gottlieb M.D.  [phone]         Again, thank you for allowing me to participate in the care of your patient.        Sincerely,        Gustabo Gottlieb MD

## 2022-06-15 NOTE — PROGRESS NOTES
Current Eye Medications:  None    Subjective:  Her for evaluation of eyes/vision after TIA.  On 06/06, he notes on the left eye he was having a visual disturbance. Opaque and wavy spot in the center vision on the left eye. Started at 10:30am to 1:45pm.  He was then seen at ER and found to have a TIA.   The visual disturbance has not returned. Vision is back to normal.   He was reading up on Humira and the side effects. Wandering if Humira caused the vision issue.   He is wearing glasses single vision for distance only.    Normal brain MRI, carotid study, heart echo with bubble test.    TORI Yuan  11:06 AM 06/15/2022    Objective:  See Ophthalmology Exam.      Assessment:  Transient visual disturbance of indeterminate etiology.     Plan:  No ocular findings of concern.  Possible retinal or visual tract TIA versus migraine equivalent.  Reasonable to consider continuing 81 mg ASA indefinitely (defer to Neurology)  Call or return visit if new symptoms occur.  Gustabo Gottlieb M.D.  [phone]

## 2022-06-15 NOTE — PATIENT INSTRUCTIONS
No ocular findings of concern.  Possible retinal or visual tract TIA versus migraine equivalent.  Reasonable to consider continuing 81 mg ASA indefinitely (defer to Neurology)  Call or return visit if new symptoms occur.  Gustabo Gottlieb M.D.  [phone]

## 2022-06-16 ASSESSMENT — EXTERNAL EXAM - LEFT EYE: OS_EXAM: NORMAL

## 2022-06-16 ASSESSMENT — SLIT LAMP EXAM - LIDS
COMMENTS: NORMAL
COMMENTS: NORMAL

## 2022-06-16 ASSESSMENT — EXTERNAL EXAM - RIGHT EYE: OD_EXAM: NORMAL

## 2022-06-16 ASSESSMENT — CUP TO DISC RATIO
OS_RATIO: 0.0
OD_RATIO: 0.0

## 2022-07-07 ENCOUNTER — MYC MEDICAL ADVICE (OUTPATIENT)
Dept: FAMILY MEDICINE | Facility: CLINIC | Age: 52
End: 2022-07-07

## 2022-07-07 DIAGNOSIS — G45.9 TIA (TRANSIENT ISCHEMIC ATTACK): ICD-10-CM

## 2022-07-07 RX ORDER — ATORVASTATIN CALCIUM 40 MG/1
40 TABLET, FILM COATED ORAL AT BEDTIME
Qty: 30 TABLET | Refills: 0 | Status: SHIPPED | OUTPATIENT
Start: 2022-07-07 | End: 2022-07-27

## 2022-07-07 NOTE — TELEPHONE ENCOUNTER
Patient returned call -   Scheduled with Dr. Duff 7/27/2022     Refill pended sent for 30 days per Dr. Duff notes     Anita Cox, Registered Nurse, PAL (Patient Advocate Liason)   [facility]   [phone]

## 2022-07-07 NOTE — TELEPHONE ENCOUNTER
Patient needs OV first within 1 to 2 months.  And yes we can refill the prescription, but he needs to let us know what pharmacy.  Kandy Duff MD  [facility]  [phone]

## 2022-07-07 NOTE — TELEPHONE ENCOUNTER
See [employer] message, see pt request and advise follow up plan, do you want virtual or F2F visit, INFORM pt of plan via [employer]    Routing refill request to provider for review/approval because:  New for Dr. Duff    Last office visit:  with Dr Duff:  2017   Future Office Visit:      **Had virtual visit -AB Jan 2022 for sinus  And ov with AB 12/30/21 for asthma    Neurology follow up recommendations:    Continue Lipitor 40 mg daily, goal LDL 40-70, <40 increases risk of ICH, titrate outpatient with PCP  - Follow up with ophthalmology (referral placed)  - Follow up with general neurology in 6-8 weeks, after 30 day heart monitor results (referral placed)   - Continue using CPAP for CHEPE  - Discussed Mediterranean diet and dietary changes  - Long term BP goal normotension <130/80 or lower for overall cardiovascular health     Stroke Education provided.  He will call us with any questions.  For any acute neurologic deficits he was advised to  go directly to the hospital rather than call the clinic.     Chelsie Pinto, APRN CNP  Labs from Chelsie Pinto:    Recent Labs   Lab Test 06/06/22  1225 12/23/19  0850   CHOL 232* 237*   HDL 40 35*   * 153*   TRIG 307* 246*     Lab Results   Component Value Date    AST 19 04/19/2022    AST 20 05/07/2021     Lab Results   Component Value Date    ALT 43 04/19/2022    ALT 38 05/07/2021     Shirin Lara RN, BSN  [facility]

## 2022-07-07 NOTE — TELEPHONE ENCOUNTER
Message left for patient to return call to this RN PAL - please transfer if available     Direct number left for this RN PAL on message for return call     SB #3 PAL letter mailed     Anita Cox Registered Nurse PAL (patient advocate liaison)   [facility] [phone]

## 2022-07-07 NOTE — LETTER
Gonzales Melo  [address] [facility] [address]    Thank you for choosing [facility] today for your health care needs.     [facility] is transforming primary care  At [facility], we re dedicated to constantly improve how we serve the health care needs of our patients and communities. We re currently making changes to the way we deliver care.     Changes you ll notice include:    An emphasis on building a relationship with a primary care provider    Access to a PAL (patient advocate and liaison) to help guide you with your care needs    Appointment lengths tailored to your specific needs and greater access to a care team to help you and your provider improve and maintain your health and well-being    Improved online access to your care team    Benefits of a primary care provider  If you don t have a designated primary care provider, we encourage you to get to know our care team online and find a provider you d like to see. Most of our providers have a short video on their online provider page. Visit [email or web address] to explore our providers and locations.    Benefits of having a primary care provider include:      They get to know you - your health history, family history and goals, making it easier to make a health plan together.     You get to know them - making health-related conversations and decisions easier      Primary care doctors help you when you re sick or hurt - but also focus on keeping you healthy with preventive care and screenings.      A doctor who sees you regularly is more likely to notice changes in your health.     You ll be connected to a broad care team who partners with your provider to support you.    Patient Advocate Liaison (PAL)   To help make sure you get the right care, at the right time, we include PALs, or Patient Advocate Liaisons, as part of your care team. Your PAL will be your first line of contact. They ll advocate for your needs and help you  navigate our services, connecting you with care team members and community resources to ensure your care is well coordinated. You ll be introduced to a PAL in an upcoming visit.     Expanded care team access with tailored appointment lengths  Depending on your health care needs, you may have longer or shorter appointments and see additional care team providers - including Medication Therapy Management (MTM) pharmacists, diabetes educators, behavioral health clinicians, or social workers. At times, they may be included in your visit with your provider, or you may see them individually.     Online access to your health care records and care team  [employer] is our online tool that makes it easy to see your health care information and communicate with your care team.     [employer] allows you to:     View your health maintenance plan so you know when you re due for a preventive screening    Send secure messages to your care team    View your health history and visit summaries     Schedule appointments     Complete questionnaires and eCheck-in before appointments      Get care from your provider with an e-visit      View and pay your bill     Sign up at [email or web address]. Once you have an account, you also can download the mobile [email or web address].     Connecting to fast and convenient care  When you need fast, convenient care - consider one of the following options:       Video Visit: A convenient care option for visiting with your provider out of the comfort of your own home. Most of the things you come to the clinic to address with your provider can now be done virtually through a video. This includes your chronic medication follow up, questions or concerns you may have, and even your annual Medicare Wellness Visit.       Phone Visit: Another convenient option for follow up of common problems that may require a more in-depth discussion with your provider.       E-visit: When you need acute care quickly, or have a quick question about  your medication, an E-visit is completed through [employer] and your provider will respond within one business day.      Anita Cox, Registered Nurse, PAL (Patient Advocate Liason)   [facility]   [phone]

## 2022-07-20 ENCOUNTER — LAB (OUTPATIENT)
Dept: LAB | Facility: CLINIC | Age: 52
End: 2022-07-20
Payer: COMMERCIAL

## 2022-07-20 DIAGNOSIS — Z79.631 LONG TERM METHOTREXATE USER: Primary | ICD-10-CM

## 2022-07-20 DIAGNOSIS — Z79.631 LONG TERM METHOTREXATE USER: ICD-10-CM

## 2022-07-20 LAB
ALBUMIN SERPL-MCNC: 3.7 G/DL (ref 3.4–5)
ALT SERPL W P-5'-P-CCNC: 45 U/L (ref 0–70)
AST SERPL W P-5'-P-CCNC: 20 U/L (ref 0–45)
CREAT SERPL-MCNC: 0.83 MG/DL (ref 0.66–1.25)
ERYTHROCYTE [DISTWIDTH] IN BLOOD BY AUTOMATED COUNT: 14.5 % (ref 10–15)
GFR SERPL CREATININE-BSD FRML MDRD: >90 ML/MIN/1.73M2
HCT VFR BLD AUTO: 44.3 % (ref 40–53)
HGB BLD-MCNC: 14.4 G/DL (ref 13.3–17.7)
MCH RBC QN AUTO: 31 PG (ref 26.5–33)
MCHC RBC AUTO-ENTMCNC: 32.5 G/DL (ref 31.5–36.5)
MCV RBC AUTO: 95 FL (ref 78–100)
PLATELET # BLD AUTO: 296 10E3/UL (ref 150–450)
RBC # BLD AUTO: 4.65 10E6/UL (ref 4.4–5.9)
WBC # BLD AUTO: 6.1 10E3/UL (ref 4–11)

## 2022-07-20 PROCEDURE — 84450 TRANSFERASE (AST) (SGOT): CPT

## 2022-07-20 PROCEDURE — 82565 ASSAY OF CREATININE: CPT

## 2022-07-20 PROCEDURE — 82040 ASSAY OF SERUM ALBUMIN: CPT

## 2022-07-20 PROCEDURE — 85018 HEMOGLOBIN: CPT

## 2022-07-20 PROCEDURE — 36415 COLL VENOUS BLD VENIPUNCTURE: CPT

## 2022-07-20 PROCEDURE — 84460 ALANINE AMINO (ALT) (SGPT): CPT

## 2022-07-27 ENCOUNTER — OFFICE VISIT (OUTPATIENT)
Dept: FAMILY MEDICINE | Facility: CLINIC | Age: 52
End: 2022-07-27
Payer: COMMERCIAL

## 2022-07-27 VITALS
BODY MASS INDEX: 40.43 KG/M2 | OXYGEN SATURATION: 96 % | DIASTOLIC BLOOD PRESSURE: 72 MMHG | RESPIRATION RATE: 14 BRPM | WEIGHT: 315 LBS | HEIGHT: 74 IN | SYSTOLIC BLOOD PRESSURE: 110 MMHG | TEMPERATURE: 98.3 F | HEART RATE: 93 BPM

## 2022-07-27 DIAGNOSIS — R73.9 ELEVATED BLOOD SUGAR: ICD-10-CM

## 2022-07-27 DIAGNOSIS — M06.9 RHEUMATOID ARTHRITIS, INVOLVING UNSPECIFIED SITE, UNSPECIFIED WHETHER RHEUMATOID FACTOR PRESENT (H): ICD-10-CM

## 2022-07-27 DIAGNOSIS — E66.01 MORBID OBESITY (H): ICD-10-CM

## 2022-07-27 DIAGNOSIS — G45.9 TIA (TRANSIENT ISCHEMIC ATTACK): Primary | ICD-10-CM

## 2022-07-27 PROCEDURE — 90750 HZV VACC RECOMBINANT IM: CPT | Performed by: FAMILY MEDICINE

## 2022-07-27 PROCEDURE — 99214 OFFICE O/P EST MOD 30 MIN: CPT | Mod: 25 | Performed by: FAMILY MEDICINE

## 2022-07-27 PROCEDURE — 90472 IMMUNIZATION ADMIN EACH ADD: CPT | Performed by: FAMILY MEDICINE

## 2022-07-27 PROCEDURE — 90471 IMMUNIZATION ADMIN: CPT | Performed by: FAMILY MEDICINE

## 2022-07-27 PROCEDURE — 90715 TDAP VACCINE 7 YRS/> IM: CPT | Performed by: FAMILY MEDICINE

## 2022-07-27 RX ORDER — ATORVASTATIN CALCIUM 40 MG/1
40 TABLET, FILM COATED ORAL AT BEDTIME
Qty: 90 TABLET | Refills: 3 | Status: SHIPPED | OUTPATIENT
Start: 2022-07-27 | End: 2023-02-01

## 2022-07-27 NOTE — ASSESSMENT & PLAN NOTE
Last A1c is 6.1, Today I counseled the patient about diet, regular exercise and weight loss planning.

## 2022-07-27 NOTE — ASSESSMENT & PLAN NOTE
Pt had follow up with neurology, advised to continue on lipitor and , his holtor monitor did not show any arrhythmias,

## 2022-07-27 NOTE — PROGRESS NOTES
"  Assessment & Plan   Problem List Items Addressed This Visit     Morbid obesity (H)     Pt has been on Keto diet and has lost about 10 pounds, he said that loosing weight is easy, but it is hard to keep the weight off, but now he is determined to keep off.  Goal for next visit is 300 by 1/2023           RA (rheumatoid arthritis) (H)     On Humira, and he is following up with Rheumatology, and he is not improving much at this time, pt is still taking Methotrexate and folic acid.              Relevant Medications    aspirin (ASA) 325 MG EC tablet    TIA (transient ischemic attack) - Primary     Pt had follow up with neurology, advised to continue on lipitor and , his holtor monitor did not show any arrhythmias,            Relevant Medications    atorvastatin (LIPITOR) 40 MG tablet    Elevated blood sugar     Last A1c is 6.1, Today I counseled the patient about diet, regular exercise and weight loss planning.                             BMI:   Estimated body mass index is 42.81 kg/m  as calculated from the following:    Height as of this encounter: 1.88 m (6' 2\").    Weight as of this encounter: 151.2 kg (333 lb 6.4 oz).   Weight management plan: Discussed healthy diet and exercise guidelines        No follow-ups on file.   Follow-up Visit   Expected date:  Jan 27, 2023 (Approximate)      Follow Up Appointment Details:     Follow-up with whom?: Me    Follow-Up for what?: Adult Preventive    How?: In Person                    Kandy Duff MD  [facility] JAYNE Narayan is a 51 year old, presenting for the following health issues:  Recheck Medication      History of Present Illness       Reason for visit:  TIA follow-up    He eats 2-3 servings of fruits and vegetables daily.He consumes 0 sweetened beverage(s) daily.He exercises with enough effort to increase his heart rate 9 or less minutes per day.  He exercises with enough effort to increase his heart rate 3 or less days per week. " "  He is taking medications regularly.       Hyperlipidemia Follow-Up      Are you regularly taking any medication or supplement to lower your cholesterol?   Yes- lipitor 40.    Are you having muscle aches or other side effects that you think could be caused by your cholesterol lowering medication?  No    Cerebrovascular Follow-up      Patient history: TIA    Residual symptoms: None    Worsened or new symptoms since last visit: No    Daily aspirin use: Yes    Hypertension controlled: Yes        Review of Systems   CONSTITUTIONAL: NEGATIVE for fever, chills, change in weight      Objective    /72 (BP Location: Right arm, Patient Position: Sitting, Cuff Size: Adult Large)   Pulse 93   Temp 98.3  F (36.8  C) (Oral)   Resp 14   Ht 1.88 m (6' 2\")   Wt (!) 151.2 kg (333 lb 6.4 oz)   SpO2 96%   BMI 42.81 kg/m    Body mass index is 42.81 kg/m .  Physical Exam   GENERAL: healthy, alert and no distress  RESP: lungs clear to auscultation - no rales, rhonchi or wheezes  CV: regular rate and rhythm, normal S1 S2, no S3 or S4, no murmur, click or rub, no peripheral edema and peripheral pulses strong  MS: no gross musculoskeletal defects noted, no edema                    .  ..  "

## 2022-07-27 NOTE — ASSESSMENT & PLAN NOTE
On Humira, and he is following up with Rheumatology, and he is not improving much at this time, pt is still taking Methotrexate and folic acid.

## 2022-07-27 NOTE — ASSESSMENT & PLAN NOTE
Pt has been on Keto diet and has lost about 10 pounds, he said that loosing weight is easy, but it is hard to keep the weight off, but now he is determined to keep off.  Goal for next visit is 300 by 1/2023

## 2022-08-03 ENCOUNTER — TELEPHONE (OUTPATIENT)
Dept: RHEUMATOLOGY | Facility: CLINIC | Age: 52
End: 2022-08-03

## 2022-08-03 NOTE — TELEPHONE ENCOUNTER
[facility]    Phone Message    May a detailed message be left on voicemail: yes     Reason for Call: Medication Question or concern regarding medication   Prescription Clarification  Name of Medication: Humira  Prescribing Provider: Dr. Moisés Vaz   Pharmacy: [facility]   Medication issue:   Call received from pharmacy with concerns regarding pt's ability to take his Humira and stay on this medication consistently.   Reports that when he was first prescribed this, he did not actually start this for several months. Then when he DID start taking this, he was only able to take a few doses before he was diagnosed with COVID and had to go off of it again. A short time later, pt had a TIA and sent a [employer] message to Dr. Vaz with concerns that his TIA could possibly have been related to the Humira.     Currently, pt has not refilled this medication since June 7th, 2022, and pharmacy reports they have not been able to reach him despite numerous attempts.   Pharmacist is concerned that perhaps pt may have some ongoing anxiety or apprehension over taking this medication, and maybe that is why he has not been responsive to their calls?   Would like Dr. Vaz and his team to be aware of the situation.     Action Taken: Message routed to:  Other: [facility] RHEUMATOLOGY    Travel Screening: Not Applicable

## 2022-08-03 NOTE — TELEPHONE ENCOUNTER
I appreciate the heads-up about possible reasons for humira discontinuation. I recommend that RN care coordinator place call to patient to determine if there are any questions about restarting medications for rheumatoid arthritis. Thanks.

## 2022-08-08 NOTE — TELEPHONE ENCOUNTER
Called pt regarding Humira.  Pt states he has been taking it as prescribed, and he is on his 3rd month.  Initially after the TIA, he had a question about the Humira and stroke symptoms. He didn't stop taking the Humira, he just wanted to to rule out Humira possibly causing the TIA.  Hoa Victor RN

## 2022-10-16 ENCOUNTER — HEALTH MAINTENANCE LETTER (OUTPATIENT)
Age: 52
End: 2022-10-16

## 2022-10-18 ENCOUNTER — TELEPHONE (OUTPATIENT)
Dept: NEUROLOGY | Facility: CLINIC | Age: 52
End: 2022-10-18

## 2022-10-18 NOTE — TELEPHONE ENCOUNTER
[facility]    Phone Message    May a detailed message be left on voicemail: yes     Reason for Call: Appointment Intake    Referring Provider Name: JACIEL WAGNER AMOR  Diagnosis and/or Symptoms: TIA    Patient reschedule their stroke appt due to provider request. Patient wanted to do a virtual appt but writer couldn't schedule due to the template not being available. Please call patient to offer a virtual and advise at # [phone]    Action Taken: Message routed to:  [facility] ([facility] Neurology     Travel Screening: Not Applicable

## 2022-10-19 ENCOUNTER — LAB (OUTPATIENT)
Dept: LAB | Facility: CLINIC | Age: 52
End: 2022-10-19
Payer: COMMERCIAL

## 2022-10-19 DIAGNOSIS — Z79.631 LONG TERM METHOTREXATE USER: ICD-10-CM

## 2022-10-19 LAB
ALBUMIN SERPL-MCNC: 3.3 G/DL (ref 3.4–5)
ALT SERPL W P-5'-P-CCNC: 50 U/L (ref 0–70)
AST SERPL W P-5'-P-CCNC: 20 U/L (ref 0–45)
CREAT SERPL-MCNC: 0.8 MG/DL (ref 0.66–1.25)
ERYTHROCYTE [DISTWIDTH] IN BLOOD BY AUTOMATED COUNT: 14.4 % (ref 10–15)
GFR SERPL CREATININE-BSD FRML MDRD: >90 ML/MIN/1.73M2
HCT VFR BLD AUTO: 42.9 % (ref 40–53)
HGB BLD-MCNC: 14.6 G/DL (ref 13.3–17.7)
MCH RBC QN AUTO: 31.4 PG (ref 26.5–33)
MCHC RBC AUTO-ENTMCNC: 34 G/DL (ref 31.5–36.5)
MCV RBC AUTO: 92 FL (ref 78–100)
PLATELET # BLD AUTO: 295 10E3/UL (ref 150–450)
RBC # BLD AUTO: 4.65 10E6/UL (ref 4.4–5.9)
WBC # BLD AUTO: 6.6 10E3/UL (ref 4–11)

## 2022-10-19 PROCEDURE — 82565 ASSAY OF CREATININE: CPT

## 2022-10-19 PROCEDURE — 85027 COMPLETE CBC AUTOMATED: CPT

## 2022-10-19 PROCEDURE — 84450 TRANSFERASE (AST) (SGOT): CPT

## 2022-10-19 PROCEDURE — 82040 ASSAY OF SERUM ALBUMIN: CPT

## 2022-10-19 PROCEDURE — 84460 ALANINE AMINO (ALT) (SGPT): CPT

## 2022-10-19 PROCEDURE — 36415 COLL VENOUS BLD VENIPUNCTURE: CPT

## 2022-10-19 NOTE — TELEPHONE ENCOUNTER
Promise, please have him follow up with a stroke DAVID sometime in December or after he sees agustin. Thanks

## 2022-10-24 ENCOUNTER — TELEPHONE (OUTPATIENT)
Dept: RHEUMATOLOGY | Facility: CLINIC | Age: 52
End: 2022-10-24

## 2022-10-24 NOTE — TELEPHONE ENCOUNTER
PA Initiation    Medication: MEDICATION-HUMIRA (PENDING)  Insurance Company:    Pharmacy Filling the Rx: [facility] - [place], [place] - [facility] [address]  Filling Pharmacy Phone: [phone]  Filling Pharmacy Fax: [phone]  Start Date: 10/24/2022

## 2022-10-26 NOTE — TELEPHONE ENCOUNTER
Pt was seen by ophthalmology in June and there were no concerns identified after their evaluation. He saw PCP in July and that appt went well too. I spoke with him this afternoon and discussed follow up recommendations. Pt asked about necessity of an additional appt so I reviewed previous stroke DAVID note from June 2022 and noted that they had just wanted him to have follow up on the cardiac event monitor and after seeing ophtho. 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 He has remained compliant on 325mg asa and 40mg lipitor and will continue to follow with her PCP for ongoing health maintenance.     Ultimately decided that it did not appear an additional follow up visit was needed because the pt has completed workup, remains compliant with medications, and does not have any additional questions or concerns. I told him that I will review with our stroke APPs and if they do still want him to have an appt then he will hear from me again. Pt in agreement.      Promise Muse BS, RN, SCRN  RN Stroke Neurology Care Coordinator  [facility] Neuroscience Service Line

## 2022-10-26 NOTE — TELEPHONE ENCOUNTER
Which results?  I just want him to see agustin followed by an DAVID and cancel the March appt with Dr. Degroot

## 2022-10-27 NOTE — TELEPHONE ENCOUNTER
Agree that if recommend workup is completed, patient is on appropriate secondary prevention and has no additional questions/concerns that follow up visit can be cancelled. Thanks.

## 2022-10-27 NOTE — TELEPHONE ENCOUNTER
Prior Authorization Approval    Authorization Effective Date: 10/25/2022  Authorization Expiration Date: 10/25/2023  Medication: MEDICATION-HUMIRA-Approved  Approved Dose/Quantity: 2/28 days  Reference #: [id number]   Insurance Company:    Expected CoPay: $0     CoPay Card Available: Yes    Foundation Assistance Needed:    Which Pharmacy is filling the prescription (Not needed for infusion/clinic administered): [facility] - [place], [place] - [address]  Pharmacy Notified: Yes  Patient Notified: Yes

## 2022-11-10 DIAGNOSIS — R76.8 RHEUMATOID FACTOR POSITIVE WITH CYCLIC CITRULLINATED PEPTIDE (CCP) ANTIBODY NEGATIVE: ICD-10-CM

## 2022-11-15 RX ORDER — ADALIMUMAB 40MG/0.4ML
40 KIT SUBCUTANEOUS
Qty: 1 EACH | Refills: 6 | Status: SHIPPED | OUTPATIENT
Start: 2022-11-15 | End: 2023-02-20

## 2022-11-15 NOTE — TELEPHONE ENCOUNTER
HUMIRA *CF* 40MG/0.4ML SYR  Last Written Prescription Date:   10/28/2021  Last Fill Quantity: 1,   # refills: 5  Last Office Visit :  10/28/2021  Future Office visit:  3/21/2023  1 each, 5 Refills sent to denise Mauricio RN  Central Triage Red Flags/Med Refills

## 2023-01-02 DIAGNOSIS — R76.8 RHEUMATOID FACTOR POSITIVE WITH CYCLIC CITRULLINATED PEPTIDE (CCP) ANTIBODY NEGATIVE: ICD-10-CM

## 2023-01-02 NOTE — TELEPHONE ENCOUNTER
methotrexate sodium 2.5 MG TABS      Last Written Prescription Date:  10/28/1298338  Last Fill Quantity: 120,   # refills: 4  Last Office Visit: 10/28/2021  Future Office visit:  none    CBC RESULTS: Recent Labs   Lab Test 10/19/22  1248   WBC 6.6   RBC 4.65   HGB 14.6   HCT 42.9   MCV 92   MCH 31.4   MCHC 34.0   RDW 14.4          Creatinine   Date Value Ref Range Status   10/19/2022 0.80 0.66 - 1.25 mg/dL Final   05/07/2021 0.92 0.66 - 1.25 mg/dL Final   ]    Liver Function Studies -   Recent Labs   Lab Test 10/19/22  1248 01/15/20  1749 12/23/19  0850   PROTTOTAL  --   --  7.3   ALBUMIN 3.3*   < > 3.2*   BILITOTAL  --   --  0.2   ALKPHOS  --   --  98   AST 20   < > 12   ALT 50   < > 28    < > = values in this interval not displayed.       Routing refill request to provider for review/approval because:  DMARD medication.  - labs due around 1/19/2023  - appointment past due  - plan last visit 10/28/2021 RTC 3 months around 1/28/2022

## 2023-01-03 RX ORDER — METHOTREXATE 2.5 MG/1
10 TABLET ORAL
Qty: 40 TABLET | Refills: 0 | Status: SHIPPED | OUTPATIENT
Start: 2023-01-03 | End: 2023-02-13

## 2023-01-16 ENCOUNTER — LAB (OUTPATIENT)
Dept: LAB | Facility: CLINIC | Age: 53
End: 2023-01-16
Payer: COMMERCIAL

## 2023-01-16 DIAGNOSIS — Z79.631 LONG TERM METHOTREXATE USER: ICD-10-CM

## 2023-01-16 LAB
ALBUMIN SERPL-MCNC: 3.7 G/DL (ref 3.4–5)
ALT SERPL W P-5'-P-CCNC: 43 U/L (ref 0–70)
AST SERPL W P-5'-P-CCNC: 26 U/L (ref 0–45)
CREAT SERPL-MCNC: 1.12 MG/DL (ref 0.66–1.25)
ERYTHROCYTE [DISTWIDTH] IN BLOOD BY AUTOMATED COUNT: 14 % (ref 10–15)
GFR SERPL CREATININE-BSD FRML MDRD: 79 ML/MIN/1.73M2
HCT VFR BLD AUTO: 45.6 % (ref 40–53)
HGB BLD-MCNC: 15.9 G/DL (ref 13.3–17.7)
MCH RBC QN AUTO: 31.1 PG (ref 26.5–33)
MCHC RBC AUTO-ENTMCNC: 34.9 G/DL (ref 31.5–36.5)
MCV RBC AUTO: 89 FL (ref 78–100)
PLATELET # BLD AUTO: 356 10E3/UL (ref 150–450)
RBC # BLD AUTO: 5.11 10E6/UL (ref 4.4–5.9)
WBC # BLD AUTO: 10 10E3/UL (ref 4–11)

## 2023-01-16 PROCEDURE — 82040 ASSAY OF SERUM ALBUMIN: CPT

## 2023-01-16 PROCEDURE — 84460 ALANINE AMINO (ALT) (SGPT): CPT

## 2023-01-16 PROCEDURE — 85027 COMPLETE CBC AUTOMATED: CPT

## 2023-01-16 PROCEDURE — 36415 COLL VENOUS BLD VENIPUNCTURE: CPT

## 2023-01-16 PROCEDURE — 82565 ASSAY OF CREATININE: CPT

## 2023-01-16 PROCEDURE — 84450 TRANSFERASE (AST) (SGOT): CPT

## 2023-02-01 ENCOUNTER — OFFICE VISIT (OUTPATIENT)
Dept: FAMILY MEDICINE | Facility: CLINIC | Age: 53
End: 2023-02-01
Payer: COMMERCIAL

## 2023-02-01 VITALS
DIASTOLIC BLOOD PRESSURE: 86 MMHG | HEART RATE: 93 BPM | WEIGHT: 305 LBS | TEMPERATURE: 98.7 F | RESPIRATION RATE: 16 BRPM | OXYGEN SATURATION: 98 % | SYSTOLIC BLOOD PRESSURE: 130 MMHG | BODY MASS INDEX: 39.16 KG/M2

## 2023-02-01 DIAGNOSIS — Z11.4 SCREENING FOR HIV (HUMAN IMMUNODEFICIENCY VIRUS): ICD-10-CM

## 2023-02-01 DIAGNOSIS — G45.9 TIA (TRANSIENT ISCHEMIC ATTACK): ICD-10-CM

## 2023-02-01 DIAGNOSIS — R73.9 ELEVATED BLOOD SUGAR: ICD-10-CM

## 2023-02-01 DIAGNOSIS — J45.20 MILD INTERMITTENT ASTHMA WITHOUT COMPLICATION: ICD-10-CM

## 2023-02-01 DIAGNOSIS — E66.01 MORBID OBESITY (H): ICD-10-CM

## 2023-02-01 DIAGNOSIS — M06.9 RHEUMATOID ARTHRITIS, INVOLVING UNSPECIFIED SITE, UNSPECIFIED WHETHER RHEUMATOID FACTOR PRESENT (H): ICD-10-CM

## 2023-02-01 LAB
CHOLEST SERPL-MCNC: 155 MG/DL
HBA1C MFR BLD: 5.7 % (ref 0–5.6)
HDLC SERPL-MCNC: 41 MG/DL
LDLC SERPL CALC-MCNC: 78 MG/DL
NONHDLC SERPL-MCNC: 114 MG/DL
TRIGL SERPL-MCNC: 182 MG/DL

## 2023-02-01 PROCEDURE — 83036 HEMOGLOBIN GLYCOSYLATED A1C: CPT | Performed by: FAMILY MEDICINE

## 2023-02-01 PROCEDURE — 90750 HZV VACC RECOMBINANT IM: CPT | Performed by: FAMILY MEDICINE

## 2023-02-01 PROCEDURE — 90472 IMMUNIZATION ADMIN EACH ADD: CPT | Performed by: FAMILY MEDICINE

## 2023-02-01 PROCEDURE — 90471 IMMUNIZATION ADMIN: CPT | Performed by: FAMILY MEDICINE

## 2023-02-01 PROCEDURE — 36415 COLL VENOUS BLD VENIPUNCTURE: CPT | Performed by: FAMILY MEDICINE

## 2023-02-01 PROCEDURE — 90677 PCV20 VACCINE IM: CPT | Performed by: FAMILY MEDICINE

## 2023-02-01 PROCEDURE — 80061 LIPID PANEL: CPT | Performed by: FAMILY MEDICINE

## 2023-02-01 PROCEDURE — 99214 OFFICE O/P EST MOD 30 MIN: CPT | Mod: 25 | Performed by: FAMILY MEDICINE

## 2023-02-01 PROCEDURE — 87389 HIV-1 AG W/HIV-1&-2 AB AG IA: CPT | Performed by: FAMILY MEDICINE

## 2023-02-01 RX ORDER — ATORVASTATIN CALCIUM 40 MG/1
40 TABLET, FILM COATED ORAL AT BEDTIME
Qty: 90 TABLET | Refills: 3 | Status: SHIPPED | OUTPATIENT
Start: 2023-02-01 | End: 2024-02-09

## 2023-02-01 RX ORDER — ALBUTEROL SULFATE 90 UG/1
1-2 AEROSOL, METERED RESPIRATORY (INHALATION) EVERY 4 HOURS PRN
Qty: 18 G | Refills: 1 | Status: SHIPPED | OUTPATIENT
Start: 2023-02-01

## 2023-02-01 RX ORDER — IPRATROPIUM BROMIDE AND ALBUTEROL SULFATE 2.5; .5 MG/3ML; MG/3ML
3 SOLUTION RESPIRATORY (INHALATION) EVERY 6 HOURS PRN
Qty: 30 ML | Refills: 1 | Status: SHIPPED | OUTPATIENT
Start: 2023-02-01

## 2023-02-01 ASSESSMENT — ASTHMA QUESTIONNAIRES
ACT_TOTALSCORE: 25
QUESTION_2 LAST FOUR WEEKS HOW OFTEN HAVE YOU HAD SHORTNESS OF BREATH: NOT AT ALL
QUESTION_1 LAST FOUR WEEKS HOW MUCH OF THE TIME DID YOUR ASTHMA KEEP YOU FROM GETTING AS MUCH DONE AT WORK, SCHOOL OR AT HOME: NONE OF THE TIME
QUESTION_4 LAST FOUR WEEKS HOW OFTEN HAVE YOU USED YOUR RESCUE INHALER OR NEBULIZER MEDICATION (SUCH AS ALBUTEROL): NOT AT ALL
QUESTION_5 LAST FOUR WEEKS HOW WOULD YOU RATE YOUR ASTHMA CONTROL: COMPLETELY CONTROLLED
ACT_TOTALSCORE: 25
QUESTION_3 LAST FOUR WEEKS HOW OFTEN DID YOUR ASTHMA SYMPTOMS (WHEEZING, COUGHING, SHORTNESS OF BREATH, CHEST TIGHTNESS OR PAIN) WAKE YOU UP AT NIGHT OR EARLIER THAN USUAL IN THE MORNING: NOT AT ALL

## 2023-02-01 ASSESSMENT — PAIN SCALES - GENERAL: PAINLEVEL: NO PAIN (0)

## 2023-02-01 NOTE — PROGRESS NOTES
"  Assessment & Plan     Screening for HIV (human immunodeficiency virus)    - HIV Antigen Antibody Combo; Future  - HIV Antigen Antibody Combo    Elevated blood sugar  Congratulated on weight loss, check A1c today.  - HEMOGLOBIN A1C; Future  - HEMOGLOBIN A1C    Mild intermittent asthma without complication  Controlled, continue on albuterol as needed.   - albuterol (PROAIR HFA/PROVENTIL HFA/VENTOLIN HFA) 108 (90 Base) MCG/ACT inhaler; Inhale 1-2 puffs into the lungs every 4 hours as needed for shortness of breath or wheezing  - ipratropium - albuterol 0.5 mg/2.5 mg/3 mL (DUONEB) 0.5-2.5 (3) MG/3ML neb solution; Take 1 vial (3 mLs) by nebulization every 6 hours as needed for shortness of breath    TIA (transient ischemic attack)  Continue on daily ASA, and atorvastatin.  - atorvastatin (LIPITOR) 40 MG tablet; Take 1 tablet (40 mg) by mouth At Bedtime  - Lipid panel reflex to direct LDL Fasting; Future  - Lipid panel reflex to direct LDL Fasting    Rheumatoid arthritis, involving unspecified site, unspecified whether rheumatoid factor present (H)  folloiwng up with Rheumatology, continue on Humira and methotrexate.     Morbid obesity (H)  Today I counseled the patient about diet, regular exercise and weight loss planning.  Recheck in 6 months, congratulated on his significant weight loss.               BMI:   Estimated body mass index is 39.16 kg/m  as calculated from the following:    Height as of 7/27/22: 1.88 m (6' 2\").    Weight as of this encounter: 138.3 kg (305 lb).   Weight management plan: Discussed healthy diet and exercise guidelines        No follow-ups on file.   Follow-up Visit   Expected date:  Aug 01, 2023 (Approximate)      Follow Up Appointment Details:     Follow-up with whom?: Me    Follow-Up for what?: Adult Preventive    How?: In Person    Is this an as-needed follow-up?: No                    Kandy Duff MD  [facility]    Chandana Narayan is a 52 year old, presenting " for the following health issues:  RECHECK      History of Present Illness       Reason for visit:  Follow-up    He eats 2-3 servings of fruits and vegetables daily.He consumes 0 sweetened beverage(s) daily.He exercises with enough effort to increase his heart rate 9 or less minutes per day.  He exercises with enough effort to increase his heart rate 3 or less days per week.   He is taking medications regularly.       Hyperlipidemia Follow-Up      Are you regularly taking any medication or supplement to lower your cholesterol?   Yes- lipitor     Are you having muscle aches or other side effects that you think could be caused by your cholesterol lowering medication?  No  Patient is following Keto diet     Cerebrovascular Follow-up      Patient history: TIA    Residual symptoms: None    Worsened or new symptoms since last visit: No    Daily aspirin use: Yes    Hypertension controlled: Yes      Review of Systems   CONSTITUTIONAL: NEGATIVE for fever, chills, change in weight      Objective    There were no vitals taken for this visit.  There is no height or weight on file to calculate BMI.  Physical Exam   GENERAL: healthy, alert and no distress  NECK: no adenopathy, no asymmetry, masses, or scars and thyroid normal to palpation  RESP: lungs clear to auscultation - no rales, rhonchi or wheezes  CV: regular rate and rhythm, normal S1 S2, no S3 or S4, no murmur, click or rub, no peripheral edema and peripheral pulses strong

## 2023-02-02 LAB — HIV 1+2 AB+HIV1 P24 AG SERPL QL IA: NONREACTIVE

## 2023-02-09 DIAGNOSIS — R76.8 RHEUMATOID FACTOR POSITIVE WITH CYCLIC CITRULLINATED PEPTIDE (CCP) ANTIBODY NEGATIVE: ICD-10-CM

## 2023-02-13 RX ORDER — METHOTREXATE 2.5 MG/1
10 TABLET ORAL
Qty: 40 TABLET | Refills: 3 | Status: SHIPPED | OUTPATIENT
Start: 2023-02-13 | End: 2023-06-07

## 2023-02-13 NOTE — TELEPHONE ENCOUNTER
methotrexate sodium 2.5 MG TABS      Last Written Prescription Date:  1/3/23  Last Fill Quantity: 40,   # refills: 0  Last Office Visit: 10/28/21  Future Office visit:  8/10/23    CBC RESULTS: Recent Labs   Lab Test 01/16/23  1553   WBC 10.0   RBC 5.11   HGB 15.9   HCT 45.6   MCV 89   MCH 31.1   MCHC 34.9   RDW 14.0          Creatinine   Date Value Ref Range Status   01/16/2023 1.12 0.66 - 1.25 mg/dL Final   05/07/2021 0.92 0.66 - 1.25 mg/dL Final   ]    Liver Function Studies -   Recent Labs   Lab Test 01/16/23  1553 01/15/20  1749 12/23/19  0850   PROTTOTAL  --   --  7.3   ALBUMIN 3.7   < > 3.2*   BILITOTAL  --   --  0.2   ALKPHOS  --   --  98   AST 26   < > 12   ALT 43   < > 28    < > = values in this interval not displayed.       Routing refill request to provider for review/approval because:  Drug not on the Rheumatology refill protocol

## 2023-02-16 DIAGNOSIS — R76.8 RHEUMATOID FACTOR POSITIVE WITH CYCLIC CITRULLINATED PEPTIDE (CCP) ANTIBODY NEGATIVE: ICD-10-CM

## 2023-02-20 RX ORDER — ADALIMUMAB 40MG/0.4ML
40 KIT SUBCUTANEOUS
Qty: 1 EACH | Refills: 6 | Status: SHIPPED | OUTPATIENT
Start: 2023-02-20 | End: 2023-08-10

## 2023-02-20 NOTE — TELEPHONE ENCOUNTER
HUMIRA *CF* 40MG/0.4ML SYR  Last Written Prescription Date:  11/15/2022  Last Fill Quantity: 1,   # refills: 6  Last Office Visit :  10/28/2021  Future Office visit:   8/10/2023  1 each, 6 Refills sent to denise Mauricio RN  Central Triage Red Flags/Med Refills

## 2023-03-21 ENCOUNTER — OFFICE VISIT (OUTPATIENT)
Dept: NEUROLOGY | Facility: CLINIC | Age: 53
End: 2023-03-21
Payer: COMMERCIAL

## 2023-03-21 VITALS
OXYGEN SATURATION: 99 % | SYSTOLIC BLOOD PRESSURE: 115 MMHG | BODY MASS INDEX: 39.14 KG/M2 | HEIGHT: 74 IN | DIASTOLIC BLOOD PRESSURE: 69 MMHG | HEART RATE: 86 BPM | WEIGHT: 305 LBS

## 2023-03-21 DIAGNOSIS — G45.9 TIA (TRANSIENT ISCHEMIC ATTACK): ICD-10-CM

## 2023-03-21 PROCEDURE — 99213 OFFICE O/P EST LOW 20 MIN: CPT | Mod: GC

## 2023-03-21 NOTE — PATIENT INSTRUCTIONS
- Ok to switch to 81 mg aspirin when ready.   Taking full dose aspirin (325mg) likely does not harm you, but has increased risk of stomach issues and bleeding, but it is up to you to decide if you would like to make the switch.   - Continue CPAP, atorvastatin and keep

## 2023-03-21 NOTE — LETTER
3/21/2023         RE: Gonzales Melo  [address]  [facility] [address]        Dear Colleague,    Thank you for referring your patient, Gonzales Melo, to the [facility] NEUR[facility]. Please see a copy of my visit note below.    [facility]/[place]  Section of General Neurology  New Patient Visit      Gonzales Melo MRN# [id number]   Age: 52 year old YOB: 1970     Requesting physician: Kandy Chandra     Reason for Consultation: TIA follow up           Assessment and Plan:   Assessment:  Mr. Sylvain Melo is a 52 year old male patient presenting for TIA follow up.  Patient's TIA occurred in June of 2022 and invovled L eye visual changes and blindness lasting for approximately 3 hours.  His head/neck imaging, ECHO, and Holter monitor testing was normal.  His pertinent stroke/TIA risk factors noted during his hospital admission include elevated LDL (131) and A1c at 6.1.  Since discharge, he has been taking  mg and atorvastatin 40 mg daily.  LDL and A1c have improved to 78 and 5.1 respectively.  He has been dieting and losing weight.  Patient is motivated to reduce risk of second event and is making great progress and has remained symptom free.  The possibility of this event being a migrainous phenomenon was discussed during this visit, however due to patient's underlying risk factors and lack of migraine history would suggest his symptoms are more likely secondary to TIA rather than migraine.  Recommendation at this time is to continue aspirin and statin therapy as prescribed.  Patient could consider reducing aspirin dose to 81 mg daily at this time.  Certainly if patient is experiencing acute neurological symptoms such as visual disturbance sensory or motor deficits, recommended he go to the emergency department immediately.     Plan:  - Continue aspirin 325 mg or 81 mg daily per patient preference  - Continue atorvastatin 40  mg daily (LDL goal less than 70)  - Continue appropriate diet and health management per primary team -(maintain A1c less than 6.5)  - No further TIA work-up necessary at this time and no formal follow-up recommended          CELY Degroot D.O.  Resident Physician of Neurology  [facility]    Patient discussed with my supervising physician Dr. Myers, who agrees with the critical findings, assessment, and plan as documented in the note above or as otherwise in their attestation.        History of Presenting Symptoms:   Gonzales Melo is a 52 year old male who presents today for evaluation of 3-hour episode of visual disturbance of left eye for 3 hours duration.  Patient describes the event occurring while at work.  Patient was called as a stroke code upon arrival to the emergency department after which head CT and CTA were performed which were negative for acute intracranial process or notable vascular stenosis.  He also obtained an MRI brain which was normal.  Echocardiogram was unrevealing.  Patient was discharged on a 30-day Holter monitor which did not show any arrhythmia.  Patient was found to have elevated LDL as well as A1c and placed on appropriate secondary prevention therapy on discharge.  He states that since discharge from the hospital he has not had any recurrent symptoms.  He met with an ophthalmologist who did not find any abnormalities with his vision or eyes.  Patient has had no other concerning neurological deficits.  Patient reports that he has no questions today.        Past Medical History:     Patient Active Problem List   Diagnosis     Asthma, mild intermittent     Hypertriglyceridemia     Plantar fasciitis     Morbid obesity (H)     Pes planus of both feet     RA (rheumatoid arthritis) (H)     TIA (transient ischemic attack)     Elevated blood sugar     Past Medical History:   Diagnosis Date     Arthritis     RA     Asthma, mild intermittent      Obesity  "1/30/2014     Plantar fasciitis 1/30/2014        Past Surgical History:     Past Surgical History:   Procedure Laterality Date     COLONOSCOPY  9/30/2015    Dr. Son [facility]     COLONOSCOPY N/A 9/30/2015    Procedure: COLONOSCOPY;  Surgeon: Marvin Son MD;  Location:  GI        Social History:     Social History     Tobacco Use     Smoking status: Never     Smokeless tobacco: Never   Vaping Use     Vaping Use: Never used   Substance Use Topics     Alcohol use: No     Drug use: No        Family History:     Family History   Problem Relation Age of Onset     Family History Negative Mother      Hypertension Maternal Grandmother      Rheumatoid Arthritis Maternal Grandmother      Heart Disease Brother      Glaucoma No family hx of      Macular Degeneration No family hx of      Diabetes No family hx of         Medications:     Current Outpatient Medications   Medication Sig     adalimumab (HUMIRA *CF*) 40 MG/0.4ML prefilled syringe kit Inject 0.4 mLs (40 mg) Subcutaneous every 14 days     albuterol (PROAIR HFA/PROVENTIL HFA/VENTOLIN HFA) 108 (90 Base) MCG/ACT inhaler Inhale 1-2 puffs into the lungs every 4 hours as needed for shortness of breath or wheezing     aspirin (ASA) 325 MG EC tablet      atorvastatin (LIPITOR) 40 MG tablet Take 1 tablet (40 mg) by mouth At Bedtime     ipratropium - albuterol 0.5 mg/2.5 mg/3 mL (DUONEB) 0.5-2.5 (3) MG/3ML neb solution Take 1 vial (3 mLs) by nebulization every 6 hours as needed for shortness of breath     methotrexate sodium 2.5 MG TABS Take 10 tablets (25 mg) by mouth every 7 days     No current facility-administered medications for this visit.        Allergies:     Allergies   Allergen Reactions     Mucinex Other (See Comments) and Hives     lip swelling     Sunscreen Dermatitis, Itching and Rash        Review of Systems:   As noted above     Physical Exam:   Vitals: /69   Pulse 86   Ht 1.88 m (6' 2\")   Wt 138.3 kg (305 lb)   SpO2 99%   BMI 39.16 kg/m     CV: " peripheral pulse appreciated  Lungs: breathing comfortably  Extremities: no edema  Skin: No rashes    Neuro:   General Appearance: No apparent distress, well-nourished, well-groomed, pleasant     Mental Status: Alert and oriented to person, place, and time. Speech fluent and comprehension intact. No dysarthria. Normal memory, fund of knowledge, attention/concentration, and language    Cranial Nerves:   II: Visual fields: normal  III: Pupils: 3 mm, equal, round, reactive to light   III,IV,VI: Extraocular Movements: intact   V: Facial sensation: intact to light touch  VII: Facial strength: intact without asymmetry  VIII: Hearing: intact grossly  IX: Palate: intact   XI: Shoulder shrug: intact  XII: Tongue movement: normal     Motor Exam:   5/5 Diffusely    No abnormal movements. Tone is normal throughout.    Sensory: intact to light touch throughout     Coordination: no dysmetria with finger-to-nose bilaterally    Reflexes: biceps, triceps, brachioradialis, patellar, and ankle jerks 2+ and symmetric. Toes are downgoing bilaterally    Gait: Deferred         Data: Pertinent prior to visit   Imaging:  June 6, 2022  HEAD CT:  1.  Normal head CT.  2.  No mass hemorrhage or stroke.  3.  Acute and chronic sphenoid sinusitis.     HEAD CTA:  1.  Normal head CTA.  2.  No significant stenosis.  No aneurysm or vascular malformation.     NECK CTA:  1.  Normal neck CTA  for age.  2.  No significant stenosis as per NASCET criteria.    June 6, 2022  MRI brain without and with contrast  1. No acute intracranial process.  2. Left sphenoid sinus presumed inflammatory disease, as described.     [place]pa 10, 2022  Echocardiogram (transthoracic)  A cardiac source of embolus was not identified.  A contrast injection (Bubble Study) was performed that was negative for flow  across the interatrial septum.  Left ventricular systolic function is normal.  The visual ejection fraction is 55-60%.  The left ventricle is normal in size.  Sinus rhythm was  noted.  Doppler interrogation does not demonstrate signficant stenosis or  insufficiency involving cardiac valves.    Procedures:  Cardiac event monitoring  Sinus rhythm throughout.  Rare PVCs.  Triggered events correlated with sinus tachycardia with heart rates ranging from 110-150 - per cardiology    Laboratory:   - 78  A1c 6.1 - 5.7           Attestation signed by Soila Myers MD at 3/24/2023 10:03 PM:  I met with the patient, reviewed the chart and confirmed pertinent findings. I agree with the documentation by Dr Degroot.     Soila Myers MD      Again, thank you for allowing me to participate in the care of your patient.        Sincerely,        Yves Degroot MD

## 2023-03-21 NOTE — NURSING NOTE
"Gonzales Melo is a 52 year old male who presents for:  Chief Complaint   Patient presents with     Stroke     TIA        Initial Vitals:  /69   Pulse 86   Ht 1.88 m (6' 2\")   Wt 138.3 kg (305 lb)   SpO2 99%   BMI 39.16 kg/m   Estimated body mass index is 39.16 kg/m  as calculated from the following:    Height as of this encounter: 1.88 m (6' 2\").    Weight as of this encounter: 138.3 kg (305 lb).. Body surface area is 2.69 meters squared. BP completed using cuff size: regular        Gayle Alaniz  "

## 2023-03-21 NOTE — PROGRESS NOTES
[facility]/[place]  Section of General Neurology  New Patient Visit      Gonzales Mleo MRN# [id number]   Age: 52 year old YOB: 1970     Requesting physician: Kandy Chandra     Reason for Consultation: TIA follow up           Assessment and Plan:   Assessment:  Mr. Sylvain Melo is a 52 year old male patient presenting for TIA follow up.  Patient's TIA occurred in June of 2022 and invovled L eye visual changes and blindness lasting for approximately 3 hours.  His head/neck imaging, ECHO, and Holter monitor testing was normal.  His pertinent stroke/TIA risk factors noted during his hospital admission include elevated LDL (131) and A1c at 6.1.  Since discharge, he has been taking  mg and atorvastatin 40 mg daily.  LDL and A1c have improved to 78 and 5.1 respectively.  He has been dieting and losing weight.  Patient is motivated to reduce risk of second event and is making great progress and has remained symptom free.  The possibility of this event being a migrainous phenomenon was discussed during this visit, however due to patient's underlying risk factors and lack of migraine history would suggest his symptoms are more likely secondary to TIA rather than migraine.  Recommendation at this time is to continue aspirin and statin therapy as prescribed.  Patient could consider reducing aspirin dose to 81 mg daily at this time.  Certainly if patient is experiencing acute neurological symptoms such as visual disturbance sensory or motor deficits, recommended he go to the emergency department immediately.     Plan:  - Continue aspirin 325 mg or 81 mg daily per patient preference  - Continue atorvastatin 40 mg daily (LDL goal less than 70)  - Continue appropriate diet and health management per primary team -(maintain A1c less than 6.5)  - No further TIA work-up necessary at this time and no formal follow-up recommended          CELY Degroot D.O.  Resident Physician  of Neurology  [facility]    Patient discussed with my supervising physician Dr. Myers, who agrees with the critical findings, assessment, and plan as documented in the note above or as otherwise in their attestation.        History of Presenting Symptoms:   Gonzales Melo is a 52 year old male who presents today for evaluation of 3-hour episode of visual disturbance of left eye for 3 hours duration.  Patient describes the event occurring while at work.  Patient was called as a stroke code upon arrival to the emergency department after which head CT and CTA were performed which were negative for acute intracranial process or notable vascular stenosis.  He also obtained an MRI brain which was normal.  Echocardiogram was unrevealing.  Patient was discharged on a 30-day Holter monitor which did not show any arrhythmia.  Patient was found to have elevated LDL as well as A1c and placed on appropriate secondary prevention therapy on discharge.  He states that since discharge from the hospital he has not had any recurrent symptoms.  He met with an ophthalmologist who did not find any abnormalities with his vision or eyes.  Patient has had no other concerning neurological deficits.  Patient reports that he has no questions today.        Past Medical History:     Patient Active Problem List   Diagnosis     Asthma, mild intermittent     Hypertriglyceridemia     Plantar fasciitis     Morbid obesity (H)     Pes planus of both feet     RA (rheumatoid arthritis) (H)     TIA (transient ischemic attack)     Elevated blood sugar     Past Medical History:   Diagnosis Date     Arthritis     RA     Asthma, mild intermittent      Obesity 1/30/2014     Plantar fasciitis 1/30/2014        Past Surgical History:     Past Surgical History:   Procedure Laterality Date     COLONOSCOPY  9/30/2015    Dr. Son [facility]     COLONOSCOPY N/A 9/30/2015    Procedure: COLONOSCOPY;  Surgeon: Marvin Son MD;  Location:  "[facility]        Social History:     Social History     Tobacco Use     Smoking status: Never     Smokeless tobacco: Never   Vaping Use     Vaping Use: Never used   Substance Use Topics     Alcohol use: No     Drug use: No        Family History:     Family History   Problem Relation Age of Onset     Family History Negative Mother      Hypertension Maternal Grandmother      Rheumatoid Arthritis Maternal Grandmother      Heart Disease Brother      Glaucoma No family hx of      Macular Degeneration No family hx of      Diabetes No family hx of         Medications:     Current Outpatient Medications   Medication Sig     adalimumab (HUMIRA *CF*) 40 MG/0.4ML prefilled syringe kit Inject 0.4 mLs (40 mg) Subcutaneous every 14 days     albuterol (PROAIR HFA/PROVENTIL HFA/VENTOLIN HFA) 108 (90 Base) MCG/ACT inhaler Inhale 1-2 puffs into the lungs every 4 hours as needed for shortness of breath or wheezing     aspirin (ASA) 325 MG EC tablet      atorvastatin (LIPITOR) 40 MG tablet Take 1 tablet (40 mg) by mouth At Bedtime     ipratropium - albuterol 0.5 mg/2.5 mg/3 mL (DUONEB) 0.5-2.5 (3) MG/3ML neb solution Take 1 vial (3 mLs) by nebulization every 6 hours as needed for shortness of breath     methotrexate sodium 2.5 MG TABS Take 10 tablets (25 mg) by mouth every 7 days     No current facility-administered medications for this visit.        Allergies:     Allergies   Allergen Reactions     Mucinex Other (See Comments) and Hives     lip swelling     Sunscreen Dermatitis, Itching and Rash        Review of Systems:   As noted above     Physical Exam:   Vitals: /69   Pulse 86   Ht 1.88 m (6' 2\")   Wt 138.3 kg (305 lb)   SpO2 99%   BMI 39.16 kg/m     CV: peripheral pulse appreciated  Lungs: breathing comfortably  Extremities: no edema  Skin: No rashes    Neuro:   General Appearance: No apparent distress, well-nourished, well-groomed, pleasant     Mental Status: Alert and oriented to person, place, and time. Speech fluent " and comprehension intact. No dysarthria. Normal memory, fund of knowledge, attention/concentration, and language    Cranial Nerves:   II: Visual fields: normal  III: Pupils: 3 mm, equal, round, reactive to light   III,IV,VI: Extraocular Movements: intact   V: Facial sensation: intact to light touch  VII: Facial strength: intact without asymmetry  VIII: Hearing: intact grossly  IX: Palate: intact   XI: Shoulder shrug: intact  XII: Tongue movement: normal     Motor Exam:   5/5 Diffusely    No abnormal movements. Tone is normal throughout.    Sensory: intact to light touch throughout     Coordination: no dysmetria with finger-to-nose bilaterally    Reflexes: biceps, triceps, brachioradialis, patellar, and ankle jerks 2+ and symmetric. Toes are downgoing bilaterally    Gait: Deferred         Data: Pertinent prior to visit   Imaging:  June 6, 2022  HEAD CT:  1.  Normal head CT.  2.  No mass hemorrhage or stroke.  3.  Acute and chronic sphenoid sinusitis.     HEAD CTA:  1.  Normal head CTA.  2.  No significant stenosis.  No aneurysm or vascular malformation.     NECK CTA:  1.  Normal neck CTA  for age.  2.  No significant stenosis as per NASCET criteria.    June 6, 2022  MRI brain without and with contrast  1. No acute intracranial process.  2. Left sphenoid sinus presumed inflammatory disease, as described.     [place] 10, 2022  Echocardiogram (transthoracic)  A cardiac source of embolus was not identified.  A contrast injection (Bubble Study) was performed that was negative for flow  across the interatrial septum.  Left ventricular systolic function is normal.  The visual ejection fraction is 55-60%.  The left ventricle is normal in size.  Sinus rhythm was noted.  Doppler interrogation does not demonstrate signficant stenosis or  insufficiency involving cardiac valves.    Procedures:  Cardiac event monitoring  Sinus rhythm throughout.  Rare PVCs.  Triggered events correlated with sinus tachycardia with heart rates ranging  from 110-150 - per cardiology    Laboratory:   - 78  A1c 6.1 - 5.7

## 2023-03-26 ENCOUNTER — HEALTH MAINTENANCE LETTER (OUTPATIENT)
Age: 53
End: 2023-03-26

## 2023-04-18 ENCOUNTER — LAB (OUTPATIENT)
Dept: LAB | Facility: CLINIC | Age: 53
End: 2023-04-18
Payer: COMMERCIAL

## 2023-04-18 DIAGNOSIS — Z79.631 LONG TERM METHOTREXATE USER: ICD-10-CM

## 2023-04-18 LAB
ALBUMIN SERPL BCG-MCNC: 4.2 G/DL (ref 3.5–5.2)
ALT SERPL W P-5'-P-CCNC: 39 U/L (ref 10–50)
AST SERPL W P-5'-P-CCNC: 20 U/L (ref 10–50)
CREAT SERPL-MCNC: 0.88 MG/DL (ref 0.67–1.17)
ERYTHROCYTE [DISTWIDTH] IN BLOOD BY AUTOMATED COUNT: 13.7 % (ref 10–15)
GFR SERPL CREATININE-BSD FRML MDRD: >90 ML/MIN/1.73M2
HCT VFR BLD AUTO: 42.8 % (ref 40–53)
HGB BLD-MCNC: 14.2 G/DL (ref 13.3–17.7)
MCH RBC QN AUTO: 31.4 PG (ref 26.5–33)
MCHC RBC AUTO-ENTMCNC: 33.2 G/DL (ref 31.5–36.5)
MCV RBC AUTO: 95 FL (ref 78–100)
PLATELET # BLD AUTO: 303 10E3/UL (ref 150–450)
RBC # BLD AUTO: 4.52 10E6/UL (ref 4.4–5.9)
WBC # BLD AUTO: 6.9 10E3/UL (ref 4–11)

## 2023-04-18 PROCEDURE — 85027 COMPLETE CBC AUTOMATED: CPT

## 2023-04-18 PROCEDURE — 82040 ASSAY OF SERUM ALBUMIN: CPT

## 2023-04-18 PROCEDURE — 84460 ALANINE AMINO (ALT) (SGPT): CPT

## 2023-04-18 PROCEDURE — 36415 COLL VENOUS BLD VENIPUNCTURE: CPT

## 2023-04-18 PROCEDURE — 84450 TRANSFERASE (AST) (SGOT): CPT

## 2023-04-18 PROCEDURE — 82565 ASSAY OF CREATININE: CPT

## 2023-06-05 DIAGNOSIS — R76.8 RHEUMATOID FACTOR POSITIVE WITH CYCLIC CITRULLINATED PEPTIDE (CCP) ANTIBODY NEGATIVE: ICD-10-CM

## 2023-06-07 RX ORDER — METHOTREXATE 2.5 MG/1
10 TABLET ORAL
Qty: 40 TABLET | Refills: 1 | Status: SHIPPED | OUTPATIENT
Start: 2023-06-07 | End: 2023-08-07

## 2023-06-07 NOTE — TELEPHONE ENCOUNTER
methotrexate sodium 2.5 MG TABS      Last Written Prescription Date:  02-  Last Fill Quantity: 40,   # refills: 3  Last Office Visit: 10-  Future Office visit:  8-    CBC RESULTS: Recent Labs   Lab Test 04/18/23  1432   WBC 6.9   RBC 4.52   HGB 14.2   HCT 42.8   MCV 95   MCH 31.4   MCHC 33.2   RDW 13.7          Creatinine   Date Value Ref Range Status   04/18/2023 0.88 0.67 - 1.17 mg/dL Final   05/07/2021 0.92 0.66 - 1.25 mg/dL Final   ]    Liver Function Studies -   Recent Labs   Lab Test 04/18/23  1432 01/15/20  1749 12/23/19  0850   PROTTOTAL  --   --  7.3   ALBUMIN 4.2   < > 3.2*   BILITOTAL  --   --  0.2   ALKPHOS  --   --  98   AST 20   < > 12   ALT 39   < > 28    < > = values in this interval not displayed.

## 2023-07-23 ENCOUNTER — TRANSFERRED RECORDS (OUTPATIENT)
Dept: HEALTH INFORMATION MANAGEMENT | Facility: CLINIC | Age: 53
End: 2023-07-23
Payer: COMMERCIAL

## 2023-07-27 ENCOUNTER — LAB (OUTPATIENT)
Dept: LAB | Facility: CLINIC | Age: 53
End: 2023-07-27
Payer: COMMERCIAL

## 2023-07-27 ENCOUNTER — TELEPHONE (OUTPATIENT)
Dept: RHEUMATOLOGY | Facility: CLINIC | Age: 53
End: 2023-07-27
Payer: COMMERCIAL

## 2023-07-27 DIAGNOSIS — Z79.631 LONG TERM METHOTREXATE USER: ICD-10-CM

## 2023-07-27 DIAGNOSIS — R76.8 RHEUMATOID FACTOR POSITIVE WITH CYCLIC CITRULLINATED PEPTIDE (CCP) ANTIBODY NEGATIVE: Primary | ICD-10-CM

## 2023-07-27 DIAGNOSIS — R76.8 RHEUMATOID FACTOR POSITIVE WITH CYCLIC CITRULLINATED PEPTIDE (CCP) ANTIBODY NEGATIVE: ICD-10-CM

## 2023-07-27 PROCEDURE — 36415 COLL VENOUS BLD VENIPUNCTURE: CPT

## 2023-07-27 PROCEDURE — 85004 AUTOMATED DIFF WBC COUNT: CPT

## 2023-07-27 NOTE — TELEPHONE ENCOUNTER
Patient just had lab draw and they didn't have any lab orders. Please enter lab orders ASAP. Thanks!

## 2023-07-28 ENCOUNTER — LAB (OUTPATIENT)
Dept: LAB | Facility: CLINIC | Age: 53
End: 2023-07-28
Payer: COMMERCIAL

## 2023-07-28 DIAGNOSIS — R76.8 RHEUMATOID FACTOR POSITIVE WITH CYCLIC CITRULLINATED PEPTIDE (CCP) ANTIBODY NEGATIVE: Primary | ICD-10-CM

## 2023-07-28 DIAGNOSIS — R76.8 RHEUMATOID FACTOR POSITIVE WITH CYCLIC CITRULLINATED PEPTIDE (CCP) ANTIBODY NEGATIVE: ICD-10-CM

## 2023-07-28 LAB
ALBUMIN SERPL BCG-MCNC: 4.2 G/DL (ref 3.5–5.2)
ALT SERPL W P-5'-P-CCNC: 36 U/L (ref 0–70)
AST SERPL W P-5'-P-CCNC: 20 U/L (ref 0–45)
BASOPHILS # BLD AUTO: 0.1 10E3/UL (ref 0–0.2)
BASOPHILS NFR BLD AUTO: 1 %
CREAT SERPL-MCNC: 0.86 MG/DL (ref 0.67–1.17)
EOSINOPHIL # BLD AUTO: 0.2 10E3/UL (ref 0–0.7)
EOSINOPHIL NFR BLD AUTO: 2 %
ERYTHROCYTE [DISTWIDTH] IN BLOOD BY AUTOMATED COUNT: 14.3 % (ref 10–15)
GFR SERPL CREATININE-BSD FRML MDRD: >90 ML/MIN/1.73M2
HCT VFR BLD AUTO: 45.4 % (ref 40–53)
HGB BLD-MCNC: 14.8 G/DL (ref 13.3–17.7)
IMM GRANULOCYTES # BLD: 0 10E3/UL
IMM GRANULOCYTES NFR BLD: 0 %
LYMPHOCYTES # BLD AUTO: 2.9 10E3/UL (ref 0.8–5.3)
LYMPHOCYTES NFR BLD AUTO: 40 %
MCH RBC QN AUTO: 31.8 PG (ref 26.5–33)
MCHC RBC AUTO-ENTMCNC: 32.6 G/DL (ref 31.5–36.5)
MCV RBC AUTO: 97 FL (ref 78–100)
MONOCYTES # BLD AUTO: 0.6 10E3/UL (ref 0–1.3)
MONOCYTES NFR BLD AUTO: 9 %
NEUTROPHILS # BLD AUTO: 3.4 10E3/UL (ref 1.6–8.3)
NEUTROPHILS NFR BLD AUTO: 48 %
NRBC # BLD AUTO: 0 10E3/UL
NRBC BLD AUTO-RTO: 0 /100
PLATELET # BLD AUTO: 307 10E3/UL (ref 150–450)
RBC # BLD AUTO: 4.66 10E6/UL (ref 4.4–5.9)
WBC # BLD AUTO: 7.2 10E3/UL (ref 4–11)

## 2023-07-28 PROCEDURE — 84460 ALANINE AMINO (ALT) (SGPT): CPT

## 2023-07-28 PROCEDURE — 82040 ASSAY OF SERUM ALBUMIN: CPT

## 2023-07-28 PROCEDURE — 82565 ASSAY OF CREATININE: CPT

## 2023-07-28 PROCEDURE — 36415 COLL VENOUS BLD VENIPUNCTURE: CPT

## 2023-07-28 PROCEDURE — 84450 TRANSFERASE (AST) (SGOT): CPT

## 2023-08-01 SDOH — ECONOMIC STABILITY: INCOME INSECURITY: IN THE LAST 12 MONTHS, WAS THERE A TIME WHEN YOU WERE NOT ABLE TO PAY THE MORTGAGE OR RENT ON TIME?: PATIENT REFUSED

## 2023-08-01 SDOH — ECONOMIC STABILITY: FOOD INSECURITY: WITHIN THE PAST 12 MONTHS, YOU WORRIED THAT YOUR FOOD WOULD RUN OUT BEFORE YOU GOT MONEY TO BUY MORE.: PATIENT DECLINED

## 2023-08-01 SDOH — ECONOMIC STABILITY: FOOD INSECURITY: WITHIN THE PAST 12 MONTHS, THE FOOD YOU BOUGHT JUST DIDN'T LAST AND YOU DIDN'T HAVE MONEY TO GET MORE.: PATIENT DECLINED

## 2023-08-01 SDOH — HEALTH STABILITY: PHYSICAL HEALTH: ON AVERAGE, HOW MANY DAYS PER WEEK DO YOU ENGAGE IN MODERATE TO STRENUOUS EXERCISE (LIKE A BRISK WALK)?: 0 DAYS

## 2023-08-01 SDOH — HEALTH STABILITY: PHYSICAL HEALTH: ON AVERAGE, HOW MANY MINUTES DO YOU ENGAGE IN EXERCISE AT THIS LEVEL?: PATIENT DECLINED

## 2023-08-01 SDOH — ECONOMIC STABILITY: INCOME INSECURITY: HOW HARD IS IT FOR YOU TO PAY FOR THE VERY BASICS LIKE FOOD, HOUSING, MEDICAL CARE, AND HEATING?: PATIENT DECLINED

## 2023-08-01 SDOH — ECONOMIC STABILITY: TRANSPORTATION INSECURITY
IN THE PAST 12 MONTHS, HAS THE LACK OF TRANSPORTATION KEPT YOU FROM MEDICAL APPOINTMENTS OR FROM GETTING MEDICATIONS?: PATIENT DECLINED

## 2023-08-01 SDOH — ECONOMIC STABILITY: TRANSPORTATION INSECURITY
IN THE PAST 12 MONTHS, HAS LACK OF TRANSPORTATION KEPT YOU FROM MEETINGS, WORK, OR FROM GETTING THINGS NEEDED FOR DAILY LIVING?: PATIENT DECLINED

## 2023-08-01 ASSESSMENT — SOCIAL DETERMINANTS OF HEALTH (SDOH)
HOW OFTEN DO YOU ATTEND CHURCH OR RELIGIOUS SERVICES?: MORE THAN 4 TIMES PER YEAR
IN A TYPICAL WEEK, HOW MANY TIMES DO YOU TALK ON THE PHONE WITH FAMILY, FRIENDS, OR NEIGHBORS?: MORE THAN THREE TIMES A WEEK
HOW OFTEN DO YOU GET TOGETHER WITH FRIENDS OR RELATIVES?: MORE THAN THREE TIMES A WEEK
DO YOU BELONG TO ANY CLUBS OR ORGANIZATIONS SUCH AS CHURCH GROUPS UNIONS, FRATERNAL OR ATHLETIC GROUPS, OR SCHOOL GROUPS?: PATIENT DECLINED
ARE YOU MARRIED, WIDOWED, DIVORCED, SEPARATED, NEVER MARRIED, OR LIVING WITH A PARTNER?: PATIENT DECLINED

## 2023-08-01 ASSESSMENT — LIFESTYLE VARIABLES
SKIP TO QUESTIONS 9-10: 0
HOW OFTEN DO YOU HAVE SIX OR MORE DRINKS ON ONE OCCASION: PATIENT DECLINED
HOW MANY STANDARD DRINKS CONTAINING ALCOHOL DO YOU HAVE ON A TYPICAL DAY: PATIENT DOES NOT DRINK
HOW OFTEN DO YOU HAVE A DRINK CONTAINING ALCOHOL: PATIENT DECLINED
AUDIT-C TOTAL SCORE: -1

## 2023-08-01 ASSESSMENT — ENCOUNTER SYMPTOMS
HEMATURIA: 0
EYE PAIN: 0
COUGH: 0
JOINT SWELLING: 0
PARESTHESIAS: 1
SORE THROAT: 0
WEAKNESS: 0
MYALGIAS: 1
ABDOMINAL PAIN: 0
ARTHRALGIAS: 1
CONSTIPATION: 0
PALPITATIONS: 0
HEARTBURN: 0
NERVOUS/ANXIOUS: 0
CHILLS: 0
HEADACHES: 0
NAUSEA: 0
SHORTNESS OF BREATH: 0
DYSURIA: 0
FREQUENCY: 0
DIZZINESS: 0
DIARRHEA: 0
FEVER: 0
HEMATOCHEZIA: 0

## 2023-08-01 ASSESSMENT — ASTHMA QUESTIONNAIRES: ACT_TOTALSCORE: 25

## 2023-08-03 DIAGNOSIS — R76.8 RHEUMATOID FACTOR POSITIVE WITH CYCLIC CITRULLINATED PEPTIDE (CCP) ANTIBODY NEGATIVE: ICD-10-CM

## 2023-08-07 ENCOUNTER — OFFICE VISIT (OUTPATIENT)
Dept: FAMILY MEDICINE | Facility: CLINIC | Age: 53
End: 2023-08-07
Payer: COMMERCIAL

## 2023-08-07 VITALS
BODY MASS INDEX: 38.63 KG/M2 | SYSTOLIC BLOOD PRESSURE: 104 MMHG | WEIGHT: 301 LBS | TEMPERATURE: 97.7 F | OXYGEN SATURATION: 97 % | RESPIRATION RATE: 14 BRPM | HEART RATE: 80 BPM | HEIGHT: 74 IN | DIASTOLIC BLOOD PRESSURE: 70 MMHG

## 2023-08-07 DIAGNOSIS — R73.9 ELEVATED BLOOD SUGAR: ICD-10-CM

## 2023-08-07 DIAGNOSIS — Z00.00 ROUTINE GENERAL MEDICAL EXAMINATION AT A HEALTH CARE FACILITY: Primary | ICD-10-CM

## 2023-08-07 LAB
CHOLEST SERPL-MCNC: 158 MG/DL
HBA1C MFR BLD: 5.5 % (ref 0–5.6)
HDLC SERPL-MCNC: 37 MG/DL
LDLC SERPL CALC-MCNC: 110 MG/DL
NONHDLC SERPL-MCNC: 121 MG/DL
TRIGL SERPL-MCNC: 57 MG/DL

## 2023-08-07 PROCEDURE — 83036 HEMOGLOBIN GLYCOSYLATED A1C: CPT | Performed by: FAMILY MEDICINE

## 2023-08-07 PROCEDURE — 99396 PREV VISIT EST AGE 40-64: CPT | Performed by: FAMILY MEDICINE

## 2023-08-07 PROCEDURE — 80061 LIPID PANEL: CPT | Performed by: FAMILY MEDICINE

## 2023-08-07 PROCEDURE — 36415 COLL VENOUS BLD VENIPUNCTURE: CPT | Performed by: FAMILY MEDICINE

## 2023-08-07 RX ORDER — METHOTREXATE 2.5 MG/1
10 TABLET ORAL
Qty: 40 TABLET | Refills: 5 | Status: SHIPPED | OUTPATIENT
Start: 2023-08-07 | End: 2023-08-10

## 2023-08-07 ASSESSMENT — ENCOUNTER SYMPTOMS
HEMATURIA: 0
SORE THROAT: 0
NAUSEA: 0
MYALGIAS: 1
EYE PAIN: 0
SHORTNESS OF BREATH: 0
HEARTBURN: 0
PALPITATIONS: 0
DIZZINESS: 0
PARESTHESIAS: 1
FEVER: 0
CONSTIPATION: 0
FREQUENCY: 0
COUGH: 0
HEADACHES: 0
NERVOUS/ANXIOUS: 0
WEAKNESS: 0
CHILLS: 0
DYSURIA: 0
HEMATOCHEZIA: 0
ABDOMINAL PAIN: 0
JOINT SWELLING: 0
ARTHRALGIAS: 1
DIARRHEA: 0

## 2023-08-07 NOTE — PROGRESS NOTES
SUBJECTIVE:   CC: Sylvain is an 52 year old who presents for preventative health visit.       8/7/2023     7:04 AM   Additional Questions   Roomed by Caron GARCIA       Healthy Habits:     Getting at least 3 servings of Calcium per day:  Yes    Bi-annual eye exam:  NO    Dental care twice a year:  Yes    Sleep apnea or symptoms of sleep apnea:  Sleep apnea    Diet:  Carbohydrate counting    Frequency of exercise:  1 day/week    Duration of exercise:  Less than 15 minutes    Taking medications regularly:  Yes    Medication side effects:  None    Additional concerns today:  No      Today's PHQ-2 Score:       8/7/2023     7:00 AM   PHQ-2 ( 1999 [employer])   Q1: Little interest or pleasure in doing things 0   Q2: Feeling down, depressed or hopeless 0   PHQ-2 Score 0   Q1: Little interest or pleasure in doing things Not at all   Q2: Feeling down, depressed or hopeless Not at all   PHQ-2 Score 0         Have you ever done Advance Care Planning? (For example, a Health Directive, POLST, or a discussion with a medical provider or your loved ones about your wishes): Yes, patient states has an Advance Care Planning document and will bring a copy to the clinic.    Social History     Tobacco Use    Smoking status: Never    Smokeless tobacco: Never   Substance Use Topics    Alcohol use: No             8/1/2023     1:41 PM   Alcohol Use   Prescreen: >3 drinks/day or >7 drinks/week? Not Applicable          No data to display                Last PSA: No results found for: PSA    Reviewed orders with patient. Reviewed health maintenance and updated orders accordingly - Yes  BP Readings from Last 3 Encounters:   08/07/23 104/70   03/21/23 115/69   02/01/23 130/86    Wt Readings from Last 3 Encounters:   08/07/23 136.5 kg (301 lb)   03/21/23 138.3 kg (305 lb)   02/01/23 138.3 kg (305 lb)                  Patient Active Problem List   Diagnosis    Asthma, mild intermittent    Hypertriglyceridemia    Plantar fasciitis    Morbid obesity (H)    Pes  planus of both feet    RA (rheumatoid arthritis) (H)    TIA (transient ischemic attack)    Elevated blood sugar     Past Surgical History:   Procedure Laterality Date    [facility]  [facility]    COLONOSCOPY N/A 9/30/2015    Procedure: COLONOSCOPY;  Surgeon: Marvin Son MD;  Location:  GI       Social History     Tobacco Use    Smoking status: Never    Smokeless tobacco: Never   Substance Use Topics    Alcohol use: No     Family History   Problem Relation Age of Onset    Family History Negative Mother     Hypertension Maternal Grandmother     Rheumatoid Arthritis Maternal Grandmother     Diabetes Maternal Grandmother     Coronary Artery Disease Maternal Grandfather     Heart Disease Brother     Glaucoma No family hx of     Macular Degeneration No family hx of          Current Outpatient Medications   Medication Sig Dispense Refill    adalimumab (HUMIRA *CF*) 40 MG/0.4ML prefilled syringe kit Inject 0.4 mLs (40 mg) Subcutaneous every 14 days 1 each 6    albuterol (PROAIR HFA/PROVENTIL HFA/VENTOLIN HFA) 108 (90 Base) MCG/ACT inhaler Inhale 1-2 puffs into the lungs every 4 hours as needed for shortness of breath or wheezing 18 g 1    aspirin (ASA) 325 MG EC tablet       atorvastatin (LIPITOR) 40 MG tablet Take 1 tablet (40 mg) by mouth At Bedtime 90 tablet 3    ipratropium - albuterol 0.5 mg/2.5 mg/3 mL (DUONEB) 0.5-2.5 (3) MG/3ML neb solution Take 1 vial (3 mLs) by nebulization every 6 hours as needed for shortness of breath 30 mL 1    methotrexate sodium 2.5 MG TABS Take 10 tablets (25 mg) by mouth every 7 days 40 tablet 1       Reviewed and updated as needed this visit by clinical staff                  Reviewed and updated as needed this visit by Provider                 Past Medical History:   Diagnosis Date    Arthritis     RA    Asthma, mild intermittent     Obesity 1/30/2014    Plantar fasciitis 1/30/2014      Past Surgical History:   Procedure Laterality Date    COLONOSCOPY   9/30/2015    Dr. Son [facility]    COLONOSCOPY N/A 9/30/2015    Procedure: COLONOSCOPY;  Surgeon: Marvin Son MD;  Location:  GI       Review of Systems   Constitutional:  Negative for chills and fever.   HENT:  Negative for congestion, ear pain, hearing loss and sore throat.    Eyes:  Negative for pain and visual disturbance.   Respiratory:  Negative for cough and shortness of breath.    Cardiovascular:  Negative for chest pain, palpitations and peripheral edema.   Gastrointestinal:  Negative for abdominal pain, constipation, diarrhea, heartburn, hematochezia and nausea.   Genitourinary:  Negative for dysuria, frequency, genital sores, hematuria, impotence, penile discharge and urgency.   Musculoskeletal:  Positive for arthralgias and myalgias. Negative for joint swelling.   Skin:  Negative for rash.   Neurological:  Positive for paresthesias. Negative for dizziness, weakness and headaches.   Psychiatric/Behavioral:  Negative for mood changes. The patient is not nervous/anxious.        OBJECTIVE:   There were no vitals taken for this visit.    Physical Exam  GENERAL: healthy, alert and no distress  EYES: Eyes grossly normal to inspection, PERRL and conjunctivae and sclerae normal  HENT: ear canals and TM's normal, nose and mouth without ulcers or lesions  NECK: no adenopathy, no asymmetry, masses, or scars and thyroid normal to palpation  RESP: lungs clear to auscultation - no rales, rhonchi or wheezes  CV: regular rate and rhythm, normal S1 S2, no S3 or S4, no murmur, click or rub, no peripheral edema and peripheral pulses strong  ABDOMEN: soft, nontender, no hepatosplenomegaly, no masses and bowel sounds normal  MS: no gross musculoskeletal defects noted, no edema  SKIN: no suspicious lesions or rashes  NEURO: Normal strength and tone, mentation intact and speech normal  PSYCH: mentation appears normal, affect normal/bright        ASSESSMENT/PLAN:   (Z00.00) Routine general medical examination at a [facility]  "care facility  (primary encounter diagnosis)  Comment: no weight loss since last time, he is doing high protein, low carb diet.   Plan: Lipid panel reflex to direct LDL Fasting        Today I counseled the patient about diet, regular exercise and weight loss planning.      (R73.9) Elevated blood sugar  Comment: recheck   Plan: HEMOGLOBIN A1C                  COUNSELING:   Reviewed preventive health counseling, as reflected in patient instructions       Regular exercise       Healthy diet/nutrition      BMI:   Estimated body mass index is 39.16 kg/m  as calculated from the following:    Height as of 3/21/23: 1.88 m (6' 2\").    Weight as of 3/21/23: 138.3 kg (305 lb).   Weight management plan: Discussed healthy diet and exercise guidelines      He reports that he has never smoked. He has never used smokeless tobacco.            Kandy Duff MD  [facility]"

## 2023-08-07 NOTE — TELEPHONE ENCOUNTER
methotrexate sodium 2.5 MG TABS  40 tablet 1 6/7/2023     Last Office Visit: 10-  Future Office visit:  08-    CBC RESULTS: Recent Labs   Lab Test 07/27/23  1409   WBC 7.2   RBC 4.66   HGB 14.8   HCT 45.4   MCV 97   MCH 31.8   MCHC 32.6   RDW 14.3          Creatinine   Date Value Ref Range Status   07/28/2023 0.86 0.67 - 1.17 mg/dL Final   05/07/2021 0.92 0.66 - 1.25 mg/dL Final   ]    Liver Function Studies -   Recent Labs   Lab Test 07/28/23  0902 01/15/20  1749 12/23/19  0850   PROTTOTAL  --   --  7.3   ALBUMIN 4.2   < > 3.2*   BILITOTAL  --   --  0.2   ALKPHOS  --   --  98   AST 20   < > 12   ALT 36   < > 28    < > = values in this interval not displayed.

## 2023-08-09 NOTE — PROGRESS NOTES
Rheumatology Clinic Visit  Moisés Vaz M.D.     Gonzales Melo MRN# [id number]   YOB: 1970 Age: 52 year old     Date of Visit: 08/10/2023    Primary care provider: Kandy Duff          Assessment and Plan:     # Seropositive rheumatoid arthritis (RF+, ACPA+ , onset 2019):   Patient describes overall good control of morning stiffness affecting small joints of the feet, ankles, and hands.  There is very occasional diminished range of motion in finger joints.  Physical exam shows resolution of tenderness at both elbows, multiple PIPs and multiple MTPs.  No synovitis palpable.  Laboratory evaluation August 2023 revealed creatinine, transaminases, and CBC all normal.    Discussion: High risk rheumatoid arthritis is adequately controlled (RAPID3 less than 1). We discussed again how maximal reduction in risk of disabling chronic inflammatory disease, as well as maximal reduction in symptoms often requires combination therapy to both suppress symptoms and to protect joints against long-term damage.  We discussed the following plan:    Plan:  1.  Continue methotrexate 25 mg orally once weekly. While on methotrexate:   -- Check labs every 3 months (AST/ALT, Albumin, CBC with platelets)   -- Limit alcohol intake to 2 drinks weekly; use folate 1 mg daily (or 2 multivitamins)  --Tylenol 500-1000 mg can be used as needed up to three times daily for nausea/headache associated with dosing.    2.   Continue adalimumab 40 mg subcutaneously once every other week.  Expect benefit within 2 to 6 weeks.  3.  Utilize acetaminophen 1000 mg up to 3 times daily as needed for residual joint pain.    I recommend follow-up in 7 months time.    Moisés Vaz MD  Staff Rheumatologist,  Health    Orders Placed This Encounter   Procedures    CBC with platelets    AST    ALT    Albumin level    Creatinine    CRP inflammation                 Active Problem List:     Patient Active Problem List    Diagnosis Date Noted    TIA  "(transient ischemic attack) 07/27/2022     Priority: Medium    Elevated blood sugar 07/27/2022     Priority: Medium    RA (rheumatoid arthritis) (H) 02/24/2022     Priority: Medium    Pes planus of both feet 12/23/2019     Priority: Medium    Morbid obesity (H) 11/30/2018     Priority: Medium    Hypertriglyceridemia 01/30/2014     Priority: Medium    Plantar fasciitis 01/30/2014     Priority: Medium    Asthma, mild intermittent      Priority: Medium            History of Present Illness:   Gonzales Melo presents for follow up of rheumatoid arthritis.  I last evaluated the patient in 10-21, and at that time, inflammatory arthritis was improved, but not completely controlled.  Recommended continuing 25 mg methotrexate per week, and adding a trial of adalimumab.    Background: Onset polyarthritis 2019.  Methotrexate monotherapy 2019 through 2021.  Adalimumab added October 2021.    Interval history August 10, 2023    Reports daily but mild, non-disruptive pain. Infrequently Hard to \"get up\" with feet, ankles, toes, and stiffness in the hands. Stiffness lasts 30 minutes. He sometimes notes difficulty with holding pressure on a wound or starting IV.  Limitation in activities is minor, and has noticed less than once monthly.  In the past month, diminished morning symptoms in association with use of low-carb diet.    He had a TIA in 6-22. He started  daily and has had no recurrence of symptoms.    Had an episode of R foot pain several weeks ago after injuring his R forefoot and toe.  Symptoms completely resolved.    He has continued humira since last visit; he feels that medication helped \"modestly\".  No AE.  Takes 10 tabs methotrexate weekly    Interval history 10-:    Overall he is well, working full-time.  He notes 1-2/10 pain in the fingers and knuckles, and fingers feel \"thick\". Pain is continuous, not really morning predominant. Sometimes wrists are involved. Feet sometimes stiff. Hands have been " "swelling more for the past couple months. No redness/warmth of joints; sensation involves phalanges between joints.   Former discomfort after immobility is less intense.    Uzses 25 mg methotrexate weekly. No AE.  No EtOH intake.    No fatiigue. Low energy.    Interval history 07-:  He is better.    He had a back injury at work on 7-13-20. He started a week of prednisone therapy. Since then, indeed, even since before he started taking the prednisone, he has noted marked reduction in bialteral sole of foot pain. Prednisone stopped over a week ago. Symptom intensity is down to 2 or 3.  Minimal morning pain.  And minimal pain with completing a full shift at work.  He has been taking increased methotrexate 10 tabs weekly for at least 8 weeks. No AE from methotrexate. Minimal morning stiffness. No swollen or tender joints.    He did see Podiatry; he has continued to wear orthotics, but he does not think they have been effective without the methotrexate.    Prior history:    Interval history, 4-28-20:    He reports doing \"ok\". He has not noted any improvement (< 5%) in his joints since starting methotrexate in 1-2020. He take 6 tabs once weekly, no difficulty. He still notes significant pain, after periods of immobility, in feet and ankles initially. He is able to walk off the pain by moving around, but it recurs as soon as he sits for awhile. Pain is on the soles and the sides of his feet; it also goes across the tops of the feet and in the base of his toes. No swelling. Mornings, he is stiff for ~ 10 minutes. 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 He is not using NSAIDs or OTC tylenol.    He recently went for a 3 mile walk; he had a tough time finishing the walk due to foot/ankle pain.     His knees give him pain, but more modest than in the feet. He attributes this pain to \"wear and tear\". UE joints are ok apart from stiffness in the fingers.    He has been to Podiatry, and has been given " shoe inserts; he has had no help from the inserts, either.         Review of Systems:       Constitutional: negative  Skin: negative  Eyes: negative  Ears/Nose/Throat: negative  Respiratory: No shortness of breath, dyspnea on exertion, cough, or hemoptysis  Cardiovascular: negative  Gastrointestinal: negative  Genitourinary: negative  Musculoskeletal: hpi  Neurologic: negative  Psychiatric: negative  Hematologic/Lymphatic/Immunologic: negative  Endocrine: negative          Past Medical History:     Past Medical History:   Diagnosis Date    Arthritis     RA    Asthma, mild intermittent     Obesity 1/30/2014    Plantar fasciitis 1/30/2014     Past Surgical History:   Procedure Laterality Date    COLONOSCOPY  9/30/2015    Dr. Son [facility]    COLONOSCOPY N/A 9/30/2015    Procedure: COLONOSCOPY;  Surgeon: Marvin Son MD;  Location:  GI     --recurrent asthma since the patient was in his mid 20s.  He has been treated with intermittent prednisone bursts 2-3 times per year.  He has never been intubated or hospitalized for asthma.  -Pyelonephritis as a teenager  - Sciatica with a right sided lumbar herniated disc.  --Seropositive rheumatoid arthritis: Recurrent symptoms in late 2019.  Started methotrexate with inadequate response at low-dose.  Had complete response with 25 mg methotrexate weekly in summer 2020.       Social History:     Social History     Occupational History    Not on file   Tobacco Use    Smoking status: Never    Smokeless tobacco: Never   Vaping Use    Vaping Use: Never used   Substance and Sexual Activity    Alcohol use: No    Drug use: No    Sexual activity: Yes     Partners: Female   Patient works as a perioperative nurse. He does not smoke.  He drinks no alcohol.  He denies frequent consumption of crustaceans or organ meats, and does not use aspirin or significant amounts of caffeine.         Family History:     Family History   Problem Relation Age of Onset    Family History Negative Mother      Hypertension Maternal Grandmother     Rheumatoid Arthritis Maternal Grandmother     Diabetes Maternal Grandmother     Coronary Artery Disease Maternal Grandfather     Heart Disease Brother     Glaucoma No family hx of     Macular Degeneration No family hx of      Maternal grandmother had rheumatoid arthritis and cerebrovascular accident.  Maternal grandfather had coronary artery disease with an MI at age 55.       Allergies:     Allergies   Allergen Reactions    Guaifenesin Er Other (See Comments) and Hives     lip swelling    Solbar Pf Spf15 Dermatitis, Itching and Rash            Medications:     Current Outpatient Medications   Medication Sig Dispense Refill    adalimumab (HUMIRA *CF*) 40 MG/0.4ML prefilled syringe kit Inject 0.4 mLs (40 mg) Subcutaneous every 14 days 1 each 6    albuterol (PROAIR HFA/PROVENTIL HFA/VENTOLIN HFA) 108 (90 Base) MCG/ACT inhaler Inhale 1-2 puffs into the lungs every 4 hours as needed for shortness of breath or wheezing 18 g 1    aspirin (ASA) 325 MG EC tablet       atorvastatin (LIPITOR) 40 MG tablet Take 1 tablet (40 mg) by mouth At Bedtime 90 tablet 3    ipratropium - albuterol 0.5 mg/2.5 mg/3 mL (DUONEB) 0.5-2.5 (3) MG/3ML neb solution Take 1 vial (3 mLs) by nebulization every 6 hours as needed for shortness of breath 30 mL 1    methotrexate sodium 2.5 MG TABS Take 10 tablets (25 mg) by mouth every 7 days 40 tablet 5            Physical Exam:   Blood pressure 108/73, pulse 82, weight 136.1 kg (300 lb), SpO2 95 %.  Wt Readings from Last 4 Encounters:   08/10/23 136.1 kg (300 lb)   08/07/23 136.5 kg (301 lb)   03/21/23 138.3 kg (305 lb)   02/01/23 138.3 kg (305 lb)     Constitutional: well-developed, appearing stated age; cooperative  Eyes: nl EOM, PERRLA, vision, conjunctiva, sclera  ENT: nl external ears, nose, hearing, lips, teeth, gums, throat  No mucous membrane lesions, normal saliva pool  Neck: no mass or thyroid enlargement  MS: There is complete resolution of former  tenderness at both elbows, right third PIP, left second PIP, multiple MTPs on both feet; fist formation is excellent.  Range of motion in wrists, elbows, and shoulders is normal.  Skin: no nail pitting, alopecia, rash, nodules or lesions  Neuro: nl cranial nerves, strength, sensation, DTRs.   Psych: nl judgement, orientation, memory, affect.         Data:     No results found for any visits on 08/10/23.        Latest Ref Rng & Units 4/18/2023     2:32 PM 7/27/2023     2:09 PM 7/28/2023     9:02 AM   RHEUM RESULTS   Albumin 3.5 - 5.2 g/dL 4.2   4.2    ALT 0 - 70 U/L 39   36    AST 0 - 45 U/L 20   20    Creatinine 0.67 - 1.17 mg/dL 0.88   0.86    GFR Estimate >60 mL/min/1.73m2 >90   >90    Hematocrit 40.0 - 53.0 % 42.8  45.4     Hemoglobin 13.3 - 17.7 g/dL 14.2  14.8     WBC 4.0 - 11.0 10e3/uL 6.9  7.2     RBC Count 4.40 - 5.90 10e6/uL 4.52  4.66     RDW 10.0 - 15.0 % 13.7  14.3     MCHC 31.5 - 36.5 g/dL 33.2  32.6     MCV 78 - 100 fL 95  97     Platelet Count 150 - 450 10e3/uL 303  307         Rheumatoid Factor   Date Value Ref Range Status   12/23/2019 44 (H) <12 IU/mL Final   ,   Cyclic Citrullinated Peptide Antibody, IgG   Date Value Ref Range Status   01/15/2020 177 (H) <7 U/mL Final     Comment:     Positive     LONA Screen by EIA   Date Value Ref Range Status   09/16/2004 <1.0  Interpretation:  Negative  Final         Latest Ref Rng & Units 4/18/2023     2:32 PM 7/27/2023     2:09 PM 7/28/2023     9:02 AM   RHEUM RESULTS   Albumin 3.5 - 5.2 g/dL 4.2   4.2    ALT 0 - 70 U/L 39   36    AST 0 - 45 U/L 20   20    Creatinine 0.67 - 1.17 mg/dL 0.88   0.86    GFR Estimate >60 mL/min/1.73m2 >90   >90    Hematocrit 40.0 - 53.0 % 42.8  45.4     Hemoglobin 13.3 - 17.7 g/dL 14.2  14.8     WBC 4.0 - 11.0 10e3/uL 6.9  7.2     RBC Count 4.40 - 5.90 10e6/uL 4.52  4.66     RDW 10.0 - 15.0 % 13.7  14.3     MCHC 31.5 - 36.5 g/dL 33.2  32.6     MCV 78 - 100 fL 95  97     Platelet Count 150 - 450 10e3/uL 303  307         Rheumatoid  Factor   Date Value Ref Range Status   12/23/2019 44 (H) <12 IU/mL Final   ,   Cyclic Citrullinated Peptide Antibody, IgG   Date Value Ref Range Status   01/15/2020 177 (H) <7 U/mL Final     Comment:     Positive   ,  ,  ,  ,  ,  ,   LONA Screen by EIA   Date Value Ref Range Status   09/16/2004 <1.0  Interpretation:  Negative  Final     Reviewed Rheumatology lab flowsheet

## 2023-08-10 ENCOUNTER — OFFICE VISIT (OUTPATIENT)
Dept: RHEUMATOLOGY | Facility: CLINIC | Age: 53
End: 2023-08-10
Payer: COMMERCIAL

## 2023-08-10 VITALS
HEART RATE: 82 BPM | WEIGHT: 300 LBS | DIASTOLIC BLOOD PRESSURE: 73 MMHG | SYSTOLIC BLOOD PRESSURE: 108 MMHG | OXYGEN SATURATION: 95 % | BODY MASS INDEX: 39.04 KG/M2

## 2023-08-10 DIAGNOSIS — R76.8 RHEUMATOID FACTOR POSITIVE WITH CYCLIC CITRULLINATED PEPTIDE (CCP) ANTIBODY NEGATIVE: ICD-10-CM

## 2023-08-10 PROCEDURE — 99214 OFFICE O/P EST MOD 30 MIN: CPT | Performed by: INTERNAL MEDICINE

## 2023-08-10 RX ORDER — ADALIMUMAB 40MG/0.4ML
40 KIT SUBCUTANEOUS
Qty: 1 EACH | Refills: 8 | Status: SHIPPED | OUTPATIENT
Start: 2023-08-10 | End: 2024-04-04

## 2023-08-10 RX ORDER — METHOTREXATE 2.5 MG/1
10 TABLET ORAL
Qty: 120 TABLET | Refills: 3 | Status: SHIPPED | OUTPATIENT
Start: 2023-08-10 | End: 2024-04-04

## 2023-08-10 ASSESSMENT — PAIN SCALES - GENERAL: PAINLEVEL: NO PAIN (1)

## 2023-08-10 NOTE — PATIENT INSTRUCTIONS
Diagnosis:  1.  Seropositive rheumatoid arthritis: Low disease activity on combination medication.  I recommend continuing adalimumab and methotrexate.    Plan:  1.  Continue methotrexate 25 mg orally once weekly. While on methotrexate:   -- Check labs every 3 months (AST/ALT, Albumin, CBC with platelets)   -- Limit alcohol intake to 2 drinks weekly; use folate 1 mg daily (or 2 multivitamins)  --Tylenol 500-1000 mg can be used as needed up to three times daily for nausea/headache associated with dosing.    2.   Continue adalimumab 40 mg subcutaneously once every other week.  Expect benefit within 2 to 6 weeks.  3.  Utilize acetaminophen 1000 mg up to 3 times daily as needed for residual joint pain.

## 2023-10-13 ENCOUNTER — TELEPHONE (OUTPATIENT)
Dept: RHEUMATOLOGY | Facility: CLINIC | Age: 53
End: 2023-10-13
Payer: COMMERCIAL

## 2023-10-13 NOTE — TELEPHONE ENCOUNTER
PA Initiation    Medication: HUMIRA *CF* 40 MG/0.4ML SC PSKT  Insurance Company: [employer] - Phone [phone] Fax [phone]  Pharmacy Filling the Rx: [facility] - [place], [place] - [facility] [address]  Filling Pharmacy Phone:    Filling Pharmacy Fax:    Start Date: 10/13/2023    [id number]

## 2023-10-13 NOTE — TELEPHONE ENCOUNTER
Prior Authorization Approval    Medication: HUMIRA *CF* 40 MG/0.4ML SC PSKT  Authorization Effective Date: 10/13/2023  Authorization Expiration Date: 10/12/2024  Approved Dose/Quantity: [id number]  Reference #: [id number]   Insurance Company: [employer] - Phone [phone] Fax [phone]  Expected CoPay: $    CoPay Card Available:      Financial Assistance Needed: no  Which Pharmacy is filling the prescription: [facility] - [place] [address] [address]  Pharmacy Notified: yes  Patient Notified: yes

## 2023-11-03 ENCOUNTER — LAB (OUTPATIENT)
Dept: LAB | Facility: CLINIC | Age: 53
End: 2023-11-03
Payer: COMMERCIAL

## 2023-11-03 DIAGNOSIS — R76.8 RHEUMATOID FACTOR POSITIVE WITH CYCLIC CITRULLINATED PEPTIDE (CCP) ANTIBODY NEGATIVE: ICD-10-CM

## 2023-11-03 LAB
ALBUMIN SERPL BCG-MCNC: 4.1 G/DL (ref 3.5–5.2)
ALT SERPL W P-5'-P-CCNC: 39 U/L (ref 0–70)
AST SERPL W P-5'-P-CCNC: 25 U/L (ref 0–45)
CREAT SERPL-MCNC: 0.91 MG/DL (ref 0.67–1.17)
CRP SERPL-MCNC: <3 MG/L
EGFRCR SERPLBLD CKD-EPI 2021: >90 ML/MIN/1.73M2
ERYTHROCYTE [DISTWIDTH] IN BLOOD BY AUTOMATED COUNT: 13.8 % (ref 10–15)
HCT VFR BLD AUTO: 43.5 % (ref 40–53)
HGB BLD-MCNC: 14.6 G/DL (ref 13.3–17.7)
MCH RBC QN AUTO: 31.4 PG (ref 26.5–33)
MCHC RBC AUTO-ENTMCNC: 33.6 G/DL (ref 31.5–36.5)
MCV RBC AUTO: 94 FL (ref 78–100)
PLATELET # BLD AUTO: 316 10E3/UL (ref 150–450)
RBC # BLD AUTO: 4.65 10E6/UL (ref 4.4–5.9)
WBC # BLD AUTO: 7.4 10E3/UL (ref 4–11)

## 2023-11-03 PROCEDURE — 84450 TRANSFERASE (AST) (SGOT): CPT

## 2023-11-03 PROCEDURE — 86140 C-REACTIVE PROTEIN: CPT

## 2023-11-03 PROCEDURE — 82565 ASSAY OF CREATININE: CPT

## 2023-11-03 PROCEDURE — 82040 ASSAY OF SERUM ALBUMIN: CPT

## 2023-11-03 PROCEDURE — 36415 COLL VENOUS BLD VENIPUNCTURE: CPT

## 2023-11-03 PROCEDURE — 84460 ALANINE AMINO (ALT) (SGPT): CPT

## 2023-11-03 PROCEDURE — 85027 COMPLETE CBC AUTOMATED: CPT

## 2023-12-15 ENCOUNTER — MYC MEDICAL ADVICE (OUTPATIENT)
Dept: FAMILY MEDICINE | Facility: CLINIC | Age: 53
End: 2023-12-15
Payer: COMMERCIAL

## 2024-02-09 DIAGNOSIS — G45.9 TIA (TRANSIENT ISCHEMIC ATTACK): ICD-10-CM

## 2024-02-09 RX ORDER — ATORVASTATIN CALCIUM 40 MG/1
40 TABLET, FILM COATED ORAL AT BEDTIME
Qty: 90 TABLET | Refills: 3 | Status: SHIPPED | OUTPATIENT
Start: 2024-02-09

## 2024-03-19 ENCOUNTER — APPOINTMENT (OUTPATIENT)
Dept: LAB | Facility: CLINIC | Age: 54
End: 2024-03-19
Payer: COMMERCIAL

## 2024-03-19 DIAGNOSIS — R76.8 RHEUMATOID FACTOR POSITIVE WITH CYCLIC CITRULLINATED PEPTIDE (CCP) ANTIBODY NEGATIVE: ICD-10-CM

## 2024-03-19 LAB
ALBUMIN SERPL BCG-MCNC: 4.3 G/DL (ref 3.5–5.2)
ALT SERPL W P-5'-P-CCNC: 46 U/L (ref 0–70)
AST SERPL W P-5'-P-CCNC: 20 U/L (ref 0–45)
CREAT SERPL-MCNC: 0.81 MG/DL (ref 0.67–1.17)
EGFRCR SERPLBLD CKD-EPI 2021: >90 ML/MIN/1.73M2
ERYTHROCYTE [DISTWIDTH] IN BLOOD BY AUTOMATED COUNT: 14.6 % (ref 10–15)
HCT VFR BLD AUTO: 41.6 % (ref 40–53)
HGB BLD-MCNC: 14.1 G/DL (ref 13.3–17.7)
MCH RBC QN AUTO: 31.8 PG (ref 26.5–33)
MCHC RBC AUTO-ENTMCNC: 33.9 G/DL (ref 31.5–36.5)
MCV RBC AUTO: 94 FL (ref 78–100)
PLATELET # BLD AUTO: 317 10E3/UL (ref 150–450)
RBC # BLD AUTO: 4.44 10E6/UL (ref 4.4–5.9)
WBC # BLD AUTO: 8 10E3/UL (ref 4–11)

## 2024-03-19 PROCEDURE — 85027 COMPLETE CBC AUTOMATED: CPT

## 2024-03-19 PROCEDURE — 84460 ALANINE AMINO (ALT) (SGPT): CPT

## 2024-03-19 PROCEDURE — 36415 COLL VENOUS BLD VENIPUNCTURE: CPT

## 2024-03-19 PROCEDURE — 82565 ASSAY OF CREATININE: CPT

## 2024-03-19 PROCEDURE — 84450 TRANSFERASE (AST) (SGOT): CPT

## 2024-03-19 PROCEDURE — 82040 ASSAY OF SERUM ALBUMIN: CPT

## 2024-03-23 ENCOUNTER — HEALTH MAINTENANCE LETTER (OUTPATIENT)
Age: 54
End: 2024-03-23

## 2024-04-03 NOTE — PROGRESS NOTES
Rheumatology Clinic Visit  Moisés Vaz M.D.     Gonzales Melo MRN# [id number]   YOB: 1970 Age: 53 year old     Date of Visit: 04/04/2024    Primary care provider: Kandy Duff          Assessment and Plan:     # Seropositive rheumatoid arthritis (RF+, ACPA+ , sx onset 2019):  # Osteoarthritis, hands, feet, knees    Patient describes no symptoms of morning stiffness, swelling, redness, warmth affecting small joints of the feet, ankles, and hands.  He does note activity induced pain in DIPs, PIPs and knuckles of the hands, and in the forefeet and knees.  There is very occasional diminished range of motion in finger joints.  Physical exam shows tenderness at L 3,4 MTP. No synovitis palpable.    Data: Laboratory evaluation 3-2024 revealed creatinine, transaminases, and CBC all normal.    Discussion: Inflammatory symptoms of high risk rheumatoid arthritis are adequately controlled (RAPID3 less than 1). We discussed again how maximal reduction in risk of disabling chronic inflammatory disease, as well as maximal reduction in symptoms often requires combination therapy to both suppress symptoms, and to protect joints against long-term damage.    We also discussed that patient likely has some early osteoarthritis affecting DIPs of the hands, (where rheumatoid arthritis is not expected to cause symptoms), but also of knuckles, PIPs, knees, forefeet and toes.  We discussed that immunomodulatory therapy is not expected to affect the severity or occurrence of symptoms due to osteoarthritis.  However I expect that progression of osteoarthritis, for which there is no treatment or cure medically, will occur slowly over decades and is unlikely to result in disability or require surgery.  As patient is now 5 years out from onset of inflammatory disease symptoms, I recommend plain x-rays to assess for progression of possible erosive disease.    We discussed the following plan:    Plan:  1.  Continue  methotrexate 25 mg orally once weekly. While on methotrexate:   -- Check labs every 4 months (AST/ALT, Albumin, CBC with platelets)   -- Limit alcohol intake to 2 drinks weekly; use folate 1 mg daily (or 2 multivitamins)  --Tylenol 500-1000 mg can be used as needed up to three times daily for nausea/headache associated with dosing.    2.   Continue adalimumab 40 mg subcutaneously once every other week.  3.  Utilize acetaminophen 1000 mg up to 3 times daily as needed for residual joint pain, especially pain associated with activity.  4.  X-rays of the hands and feet to look for erosions.    RTC 8  mos    Moisés Vaz MD  Staff Rheumatologist, [facility]    Orders Placed This Encounter   Procedures    XR Foot Bilateral 1 View    XR Hands Bilateral PA View    CBC with platelets    AST    ALT    Albumin level    Creatinine     On the day of the encounter, a total of 34 minutes was spent in chart review, and in counseling and coordination of care, regarding the patient's complex medical problem of RA and OA.    The longitudinal plan of care for the diagnosis(es)/condition(s) as documented were addressed during this visit. Due to the added complexity in care, I will continue to support Sylvain in the subsequent management and with ongoing continuity of care.    Orders Placed This Encounter   Procedures    XR Foot Bilateral 1 View    XR Hands Bilateral PA View    CBC with platelets    AST    ALT    Albumin level    Creatinine                Active Problem List:     Patient Active Problem List    Diagnosis Date Noted    TIA (transient ischemic attack) 07/27/2022     Priority: Medium    Elevated blood sugar 07/27/2022     Priority: Medium    RA (rheumatoid arthritis) (H) 02/24/2022     Priority: Medium    Pes planus of both feet 12/23/2019     Priority: Medium    Morbid obesity (H) 11/30/2018     Priority: Medium    Hypertriglyceridemia 01/30/2014     Priority: Medium    Plantar fasciitis 01/30/2014     Priority: Medium     "Asthma, mild intermittent      Priority: Medium            History of Present Illness:   Gonzales Melo presents for follow up of rheumatoid arthritis.  I last evaluated the patient in 8-2023, and at that time, inflammatory arthritis was adequately controlled.  Recommended continuing 25 mg methotrexate per week, and adalimumab q o wk.    Background: Onset polyarthritis 2019.  Methotrexate monotherapy 2019 through 2021.  Adalimumab added October 2021.    Interval history April 4, 2024    He reports moderate, recurrent pain in many fingers and knuckles. 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 No visible swelling, redness.  Pain is not of sufficient severity that he thinks of using over-the-counter remedies to help.  Early morning stiffness is not prominent.  Stiffness in both knees, most noticeable for several minutes after immobility and sitting.  Relieved with activity.  There is stable stiffness in toes and ankles, noticeable after sitting. After moving,     He has continued humira. No AE.  Takes 10 tabs methotrexate weekly, no adverse effects.      Interval history August 10, 2023    Reports daily but mild, non-disruptive pain. Infrequently Hard to \"get up\" with feet, ankles, toes, and stiffness in the hands. Stiffness lasts 30 minutes. He sometimes notes difficulty with holding pressure on a wound or starting IV.  Limitation in activities is minor, and has noticed less than once monthly.  In the past month, diminished morning symptoms in association with use of low-carb diet.    He had a TIA in 6-22. He started  daily and has had no recurrence of symptoms.    Had an episode of R foot pain several weeks ago after injuring his R forefoot and toe.  Symptoms completely resolved.    He has continued humira since last visit; he feels that medication helped \"modestly\".  No AE.  Takes 10 tabs methotrexate weekly    Interval history " "10-:    Overall he is well, working full-time.  He notes 1-2/10 pain in the fingers and knuckles, and fingers feel \"thick\". Pain is continuous, not really morning predominant. Sometimes wrists are involved. Feet sometimes stiff. Hands have been swelling more for the past couple months. No redness/warmth of joints; sensation involves phalanges between joints.   Former discomfort after immobility is less intense.    Uzses 25 mg methotrexate weekly. No AE.  No EtOH intake.    No fatiigue. Low energy.    Interval history 07-:  He is better.    He had a back injury at work on 7-13-20. He started a week of prednisone therapy. Since then, indeed, even since before he started taking the prednisone, he has noted marked reduction in bialteral sole of foot pain. Prednisone stopped over a week ago. Symptom intensity is down to 2 or 3.  Minimal morning pain.  And minimal pain with completing a full shift at work.  He has been taking increased methotrexate 10 tabs weekly for at least 8 weeks. No AE from methotrexate. Minimal morning stiffness. No swollen or tender joints.    He did see Podiatry; he has continued to wear orthotics, but he does not think they have been effective without the methotrexate.    Prior history:    Interval history, 4-28-20:    He reports doing \"ok\". He has not noted any improvement (< 5%) in his joints since starting methotrexate in 1-2020. He take 6 tabs once weekly, no difficulty. He still notes significant pain, after periods of immobility, in feet and ankles initially. He is able to walk off the pain by moving around, but it recurs as soon as he sits for awhile. Pain is on the soles and the sides of his feet; it also goes across the tops of the feet and in the base of his toes. No swelling. Mornings, he is stiff for ~ 10 minutes. 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 He is not using NSAIDs or OTC tylenol.    He recently went for a 3 mile walk; he had a tough time " "finishing the walk due to foot/ankle pain.     His knees give him pain, but more modest than in the feet. He attributes this pain to \"wear and tear\". UE joints are ok apart from stiffness in the fingers.    He has been to Podiatry, and has been given shoe inserts; he has had no help from the inserts, either.         Review of Systems:       Constitutional: negative  Skin: negative  Eyes: negative  Ears/Nose/Throat: negative  Respiratory: No shortness of breath, dyspnea on exertion, cough, or hemoptysis  Cardiovascular: negative  Gastrointestinal: negative  Genitourinary: negative  Musculoskeletal: hpi  Neurologic: negative  Psychiatric: negative  Hematologic/Lymphatic/Immunologic: negative  Endocrine: negative          Past Medical History:     Past Medical History:   Diagnosis Date    Arthritis     RA    Asthma, mild intermittent     Obesity 1/30/2014    Plantar fasciitis 1/30/2014     Past Surgical History:   Procedure Laterality Date    COLONOSCOPY  9/30/2015    Dr. Son [facility]    COLONOSCOPY N/A 9/30/2015    Procedure: COLONOSCOPY;  Surgeon: Marvin Son MD;  Location:  GI     --recurrent asthma since the patient was in his mid 20s.  He has been treated with intermittent prednisone bursts 2-3 times per year.  He has never been intubated or hospitalized for asthma.  -Pyelonephritis as a teenager  - Sciatica with a right sided lumbar herniated disc.  --Seropositive rheumatoid arthritis: Recurrent symptoms in late 2019.  Started methotrexate with inadequate response at low-dose.  Had complete response with 25 mg methotrexate weekly in summer 2020.       Social History:     Social History     Occupational History    Not on file   Tobacco Use    Smoking status: Never    Smokeless tobacco: Never   Vaping Use    Vaping Use: Never used   Substance and Sexual Activity    Alcohol use: No    Drug use: No    Sexual activity: Yes     Partners: Female   Patient works as a perioperative nurse. He does not smoke.  He " drinks no alcohol.  He denies frequent consumption of crustaceans or organ meats, and does not use aspirin or significant amounts of caffeine.         Family History:     Family History   Problem Relation Age of Onset    Family History Negative Mother     Hypertension Maternal Grandmother     Rheumatoid Arthritis Maternal Grandmother     Diabetes Maternal Grandmother     Coronary Artery Disease Maternal Grandfather     Heart Disease Brother     Glaucoma No family hx of     Macular Degeneration No family hx of      Maternal grandmother had rheumatoid arthritis from early 20s and cerebrovascular accident.  Maternal grandfather had coronary artery disease with an MI at age 55.         Allergies:     Allergies   Allergen Reactions    Guaifenesin Er Other (See Comments) and Hives     lip swelling    Solbar Pf Spf15 Dermatitis, Itching and Rash            Medications:     Current Outpatient Medications   Medication Sig Dispense Refill    adalimumab (HUMIRA *CF*) 40 MG/0.4ML prefilled syringe kit Inject 0.4 mLs (40 mg) Subcutaneous every 14 days 1 each 10    albuterol (PROAIR HFA/PROVENTIL HFA/VENTOLIN HFA) 108 (90 Base) MCG/ACT inhaler Inhale 1-2 puffs into the lungs every 4 hours as needed for shortness of breath or wheezing 18 g 1    aspirin (ASA) 325 MG EC tablet       atorvastatin (LIPITOR) 40 MG tablet TAKE 1 TABLET BY MOUTH AT BEDTIME 90 tablet 3    ipratropium - albuterol 0.5 mg/2.5 mg/3 mL (DUONEB) 0.5-2.5 (3) MG/3ML neb solution Take 1 vial (3 mLs) by nebulization every 6 hours as needed for shortness of breath 30 mL 1    methotrexate 2.5 MG tablet Take 10 tablets (25 mg) by mouth every 7 days 120 tablet 3            Physical Exam:   Blood pressure 109/72, pulse 82, resp. rate 16, weight 141.3 kg (311 lb 8 oz), SpO2 95%.  Wt Readings from Last 4 Encounters:   04/04/24 141.3 kg (311 lb 8 oz)   08/10/23 136.1 kg (300 lb)   08/07/23 136.5 kg (301 lb)   03/21/23 138.3 kg (305 lb)     Constitutional: well-developed,  appearing stated age; cooperative  Eyes: nl EOM, PERRLA, vision, conjunctiva, sclera  ENT: nl external ears, nose, hearing, lips, teeth, gums, throat  No mucous membrane lesions, normal saliva pool  Neck: no mass or thyroid enlargement  Lungs: Breathing unlabored  MS: No sign of former tenderness at both elbows, right third PIP, left second PIP, multiple MTPs on both feet; fist formation is excellent.  Mild tenderness without synovial thickening at left third and fourth MTPs.  Range of motion in wrists, elbows, knees and shoulders is normal.  Skin: no nail pitting, alopecia, rash, nodules or lesions  Neuro: nl cranial nerves, strength, sensation, DTRs.   Psych: nl judgement, orientation, memory, affect.         Data:     No results found for any visits on 04/04/24.        Latest Ref Rng & Units 7/28/2023     9:02 AM 11/3/2023     9:42 AM 3/19/2024     1:48 PM   RHEUM RESULTS   Albumin 3.5 - 5.2 g/dL 4.2  4.1  4.3    ALT 0 - 70 U/L 36  39  46    AST 0 - 45 U/L 20  25  20    Creatinine 0.67 - 1.17 mg/dL 0.86  0.91  0.81    CRP Inflammation <5.00 mg/L  <3.00     GFR Estimate >60 mL/min/1.73m2 >90  >90  >90    Hematocrit 40.0 - 53.0 %  43.5  41.6    Hemoglobin 13.3 - 17.7 g/dL  14.6  14.1    WBC 4.0 - 11.0 10e3/uL  7.4  8.0    RBC Count 4.40 - 5.90 10e6/uL  4.65  4.44    RDW 10.0 - 15.0 %  13.8  14.6    MCHC 31.5 - 36.5 g/dL  33.6  33.9    MCV 78 - 100 fL  94  94    Platelet Count 150 - 450 10e3/uL  316  317        Rheumatoid Factor   Date Value Ref Range Status   12/23/2019 44 (H) <12 IU/mL Final   ,   Cyclic Citrullinated Peptide Antibody, IgG   Date Value Ref Range Status   01/15/2020 177 (H) <7 U/mL Final     Comment:     Positive     LONA Screen by EIA   Date Value Ref Range Status   09/16/2004 <1.0  Interpretation:  Negative  Final         Latest Ref Rng & Units 7/28/2023     9:02 AM 11/3/2023     9:42 AM 3/19/2024     1:48 PM   RHEUM RESULTS   Albumin 3.5 - 5.2 g/dL 4.2  4.1  4.3    ALT 0 - 70 U/L 36  39  46     AST 0 - 45 U/L 20  25  20    Creatinine 0.67 - 1.17 mg/dL 0.86  0.91  0.81    CRP Inflammation <5.00 mg/L  <3.00     GFR Estimate >60 mL/min/1.73m2 >90  >90  >90    Hematocrit 40.0 - 53.0 %  43.5  41.6    Hemoglobin 13.3 - 17.7 g/dL  14.6  14.1    WBC 4.0 - 11.0 10e3/uL  7.4  8.0    RBC Count 4.40 - 5.90 10e6/uL  4.65  4.44    RDW 10.0 - 15.0 %  13.8  14.6    MCHC 31.5 - 36.5 g/dL  33.6  33.9    MCV 78 - 100 fL  94  94    Platelet Count 150 - 450 10e3/uL  316  317        Rheumatoid Factor   Date Value Ref Range Status   12/23/2019 44 (H) <12 IU/mL Final   ,   Cyclic Citrullinated Peptide Antibody, IgG   Date Value Ref Range Status   01/15/2020 177 (H) <7 U/mL Final     Comment:     Positive   ,  ,  ,  ,  ,  ,   LONA Screen by EIA   Date Value Ref Range Status   09/16/2004 <1.0  Interpretation:  Negative  Final     Reviewed Rheumatology lab flowsheet

## 2024-04-04 ENCOUNTER — OFFICE VISIT (OUTPATIENT)
Dept: RHEUMATOLOGY | Facility: CLINIC | Age: 54
End: 2024-04-04
Payer: COMMERCIAL

## 2024-04-04 VITALS
RESPIRATION RATE: 16 BRPM | HEART RATE: 82 BPM | DIASTOLIC BLOOD PRESSURE: 72 MMHG | WEIGHT: 311.5 LBS | SYSTOLIC BLOOD PRESSURE: 109 MMHG | BODY MASS INDEX: 40.54 KG/M2 | OXYGEN SATURATION: 95 %

## 2024-04-04 DIAGNOSIS — R76.8 RHEUMATOID FACTOR POSITIVE WITH CYCLIC CITRULLINATED PEPTIDE (CCP) ANTIBODY NEGATIVE: ICD-10-CM

## 2024-04-04 PROCEDURE — 99214 OFFICE O/P EST MOD 30 MIN: CPT | Performed by: INTERNAL MEDICINE

## 2024-04-04 PROCEDURE — G2211 COMPLEX E/M VISIT ADD ON: HCPCS | Performed by: INTERNAL MEDICINE

## 2024-04-04 RX ORDER — METHOTREXATE 2.5 MG/1
25 TABLET ORAL
Qty: 120 TABLET | Refills: 3 | Status: SHIPPED | OUTPATIENT
Start: 2024-04-04

## 2024-04-04 RX ORDER — ADALIMUMAB 40MG/0.4ML
40 KIT SUBCUTANEOUS
Qty: 1 EACH | Refills: 10 | Status: SHIPPED | OUTPATIENT
Start: 2024-04-04

## 2024-04-04 ASSESSMENT — PAIN SCALES - GENERAL: PAINLEVEL: NO PAIN (1)

## 2024-04-04 NOTE — NURSING NOTE
Chief Complaint   Patient presents with    RECHECK     Rheumatoid factor positive with cyclic citrullinated peptide (CCP) antibody negative       Vitals:    04/04/24 0719   BP: 109/72   BP Location: Left arm   Patient Position: Sitting   Cuff Size: Adult Large   Pulse: 82   Resp: 16   SpO2: 95%   Weight: 141.3 kg (311 lb 8 oz)       Body mass index is 40.54 kg/m .      Clarence Schultz MA

## 2024-04-04 NOTE — PATIENT INSTRUCTIONS
"Diagnosis:  1.  Seropositive rheumatoid arthritis: Low disease activity on combination medication.  I recommend continuing adalimumab and methotrexate.  2.  Activity associated pain in hands and feet.  Most likely cause is early osteoarthritis with \"gelling phenomenon\".  Recommend x-rays to investigate.    Plan:  1.  Continue methotrexate 25 mg orally once weekly. While on methotrexate:   -- Check labs every 4 months (AST/ALT, Albumin, CBC with platelets)   -- Limit alcohol intake to 2 drinks weekly; use folate 1 mg daily (or 2 multivitamins)  --Tylenol 500-1000 mg can be used as needed up to three times daily for nausea/headache associated with dosing.    2.   Continue adalimumab 40 mg subcutaneously once every other week.  3.  Utilize acetaminophen 1000 mg up to 3 times daily as needed for residual joint pain, especially pain associated with activity.  4.  X-rays of the hands and feet to look for erosions.  "

## 2024-04-05 ENCOUNTER — HOSPITAL ENCOUNTER (OUTPATIENT)
Dept: GENERAL RADIOLOGY | Facility: CLINIC | Age: 54
Discharge: HOME | End: 2024-04-05
Attending: INTERNAL MEDICINE
Payer: COMMERCIAL

## 2024-04-05 DIAGNOSIS — R76.8 RHEUMATOID FACTOR POSITIVE WITH CYCLIC CITRULLINATED PEPTIDE (CCP) ANTIBODY NEGATIVE: ICD-10-CM

## 2024-04-05 PROCEDURE — 73120 X-RAY EXAM OF HAND: CPT | Mod: 50,52

## 2024-04-05 PROCEDURE — 73620 X-RAY EXAM OF FOOT: CPT | Mod: 50,52

## 2024-07-08 ENCOUNTER — PATIENT OUTREACH (OUTPATIENT)
Dept: CARE COORDINATION | Facility: CLINIC | Age: 54
End: 2024-07-08
Payer: COMMERCIAL

## 2024-07-22 ENCOUNTER — PATIENT OUTREACH (OUTPATIENT)
Dept: CARE COORDINATION | Facility: CLINIC | Age: 54
End: 2024-07-22
Payer: COMMERCIAL

## 2024-09-14 ENCOUNTER — APPOINTMENT (OUTPATIENT)
Dept: LAB | Facility: CLINIC | Age: 54
End: 2024-09-14
Payer: COMMERCIAL

## 2024-09-14 DIAGNOSIS — R76.8 RHEUMATOID FACTOR POSITIVE WITH CYCLIC CITRULLINATED PEPTIDE (CCP) ANTIBODY NEGATIVE: ICD-10-CM

## 2024-09-14 LAB
ALBUMIN SERPL BCG-MCNC: 3.9 G/DL (ref 3.5–5.2)
ALT SERPL W P-5'-P-CCNC: 49 U/L (ref 0–70)
AST SERPL W P-5'-P-CCNC: 27 U/L (ref 0–45)
CREAT SERPL-MCNC: 0.88 MG/DL (ref 0.67–1.17)
EGFRCR SERPLBLD CKD-EPI 2021: >90 ML/MIN/1.73M2
ERYTHROCYTE [DISTWIDTH] IN BLOOD BY AUTOMATED COUNT: 13.7 % (ref 10–15)
HCT VFR BLD AUTO: 42.9 % (ref 40–53)
HGB BLD-MCNC: 14.8 G/DL (ref 13.3–17.7)
MCH RBC QN AUTO: 31.8 PG (ref 26.5–33)
MCHC RBC AUTO-ENTMCNC: 34.5 G/DL (ref 31.5–36.5)
MCV RBC AUTO: 92 FL (ref 78–100)
PLATELET # BLD AUTO: 291 10E3/UL (ref 150–450)
RBC # BLD AUTO: 4.66 10E6/UL (ref 4.4–5.9)
WBC # BLD AUTO: 6.8 10E3/UL (ref 4–11)

## 2024-09-14 PROCEDURE — 82565 ASSAY OF CREATININE: CPT

## 2024-09-14 PROCEDURE — 36415 COLL VENOUS BLD VENIPUNCTURE: CPT

## 2024-09-14 PROCEDURE — 84460 ALANINE AMINO (ALT) (SGPT): CPT

## 2024-09-14 PROCEDURE — 82040 ASSAY OF SERUM ALBUMIN: CPT

## 2024-09-14 PROCEDURE — 85027 COMPLETE CBC AUTOMATED: CPT

## 2024-09-14 PROCEDURE — 84450 TRANSFERASE (AST) (SGOT): CPT

## 2024-09-30 ENCOUNTER — TELEPHONE (OUTPATIENT)
Dept: RHEUMATOLOGY | Facility: CLINIC | Age: 54
End: 2024-09-30
Payer: COMMERCIAL

## 2024-09-30 NOTE — TELEPHONE ENCOUNTER
PA Initiation    Medication: HUMIRA *CF* 40 MG/0.4ML SC PSKT  Insurance Company: [employer] - Phone [phone] Fax [phone]  Pharmacy Filling the Rx: [facility] - [place], [place] - [facility] [address]  Filling Pharmacy Phone:    Filling Pharmacy Fax:    Start Date: 9/30/2024    [id number]

## 2024-10-03 NOTE — TELEPHONE ENCOUNTER
Prior Authorization Approval    Medication: HUMIRA *CF* 40 MG/0.4ML SC PSKT  Authorization Effective Date: 10/3/2024  Authorization Expiration Date: 9/30/2025  Approved Dose/Quantity: 2  Reference #: [id number]   Insurance Company: [employer] - Phone [phone] Fax [phone]  Expected CoPay: $    CoPay Card Available: No    Financial Assistance Needed: no  Which Pharmacy is filling the prescription: [facility] - [place], [place] - [address]  Pharmacy Notified: yes  Patient Notified: yes

## 2024-10-11 ENCOUNTER — PATIENT OUTREACH (OUTPATIENT)
Dept: CARE COORDINATION | Facility: CLINIC | Age: 54
End: 2024-10-11
Payer: COMMERCIAL

## 2024-10-19 ENCOUNTER — HEALTH MAINTENANCE LETTER (OUTPATIENT)
Age: 54
End: 2024-10-19

## 2024-12-11 NOTE — PROGRESS NOTES
Rheumatology Clinic Visit  Moisés Vaz M.D.     Gonzales Melo MRN# [id number]   YOB: 1970 Age: 54 year old     Date of Visit: 12/12/2024    Primary care provider: Kandy Duff          Assessment and Plan:     # Seropositive rheumatoid arthritis (RF+, ACPA+ , sx onset 2019):  # Osteoarthritis, hands, feet, knees    Patient describes no symptoms of morning stiffness, swelling, redness, warmth affecting small joints of the feet, ankles, and hands.  He does note activity induced pain in DIPs, PIPs and knuckles of the hands, and in the forefeet and knees.  There is very occasional diminished range of motion in finger joints.  Physical exam shows tenderness at L 3,4 MTP. No synovitis palpable.    Data: Blood work on September 14, 2024 showed creatinine, albumin, transaminases, CBC all normal.  Imaging:  x-rays of the hands in April 5, 2024 showed no discrete erosions.  X-rays of the feet showed no erosions or periarticular osteopenia.    Discussion: Inflammatory symptoms of high risk rheumatoid arthritis are adequately controlled (RAPID3 less than 1). We discussed again how maximal reduction in risk of disabling chronic inflammatory disease, as well as maximal reduction in symptoms may require ongoing immunomodulatory treatment to both suppress symptoms, and to protect joints against long-term damage.    We discussed again that patient's joint pain is multifactorial, with activity induced pain, and ball of foot pain associated with immobility during the day likely due to osteoarthritis in the hands and feet.  Such symptoms are not likely to change with altered immunomodulatory medication.  I recommended that patient continue to be mindful of the nature of joint pain, the time of day when it is worst, the distribution of the joints involved, and whether inflammatory symptoms such as warmth redness and prolonged stiffness are components of the joint discomfort.  In the absence of such symptoms, I  "think that the patient's current regimen is well-tolerated and is effective for maximally suppressing risk of long-term joint damage.  I will add inflammatory markers to upcoming blood work to confirm or disprove the notion that blood-borne inflammation accompanies mild symptoms that could be interpreted as inflammatory.  We briefly discussed Therapy alternatives to combination Humira/methotrexate, including Jose inhibitors (upadacitinib), and abatacept.    We discussed the following plan:    Diagnosis:  1.  Seropositive rheumatoid arthritis: Low disease activity on combination medication.  I recommend continuing adalimumab and methotrexate.  2.  Activity associated pain in hands and feet.  Most likely cause is early osteoarthritis with \"gelling phenomenon\".    Plan:  1.  Continue methotrexate 25 mg orally once weekly. While on methotrexate:   -- Check labs every 4 months (AST/ALT, Albumin, CBC with platelets)   -- Limit alcohol intake to 2 drinks weekly; use folate 1 mg daily (or 2 multivitamins)  --Tylenol 500-1000 mg can be used as needed up to three times daily for nausea/headache associated with dosing.    2.   Continue adalimumab 40 mg subcutaneously once every other week.  3.  Utilize acetaminophen 1000 mg up to 3 times daily as needed for residual joint pain, especially pain associated with activity.  Try using acetaminophen as a preventative pain reliever when anticipating repetitive use of the hands.  4.  Monitor for morning stiffness and stiffness after immobility in the feet.  If stiffness worsens, or if inflammatory markers in the blood indicate uncontrolled inflammation, would consider substitution of Orencia for current antirheumatic medications.  5.  MTM referral for assistance with annual reapplication for approval to have Humira covered.    RTC 8  mos    Moisés Vaz MD  Staff Rheumatologist, [facility]    Orders Placed This Encounter   Procedures    CBC with platelets    AST    ALT    Albumin " "level    Creatinine    CRP inflammation    Erythrocyte sedimentation rate auto    Med Therapy Management Referral     On the day of the encounter, a total of 35 minutes was spent in chart review, and in counseling and coordination of care, regarding the patient's complex medical problem of RA and OA, high risk medication.    The longitudinal plan of care for the diagnosis(es)/condition(s) as documented were addressed during this visit. Due to the added complexity in care, I will continue to support Sylvain in the subsequent management and with ongoing continuity of care.              Active Problem List:     Patient Active Problem List    Diagnosis Date Noted    TIA (transient ischemic attack) 07/27/2022     Priority: Medium    Elevated blood sugar 07/27/2022     Priority: Medium    RA (rheumatoid arthritis) (H) 02/24/2022     Priority: Medium    Pes planus of both feet 12/23/2019     Priority: Medium    Morbid obesity (H) 11/30/2018     Priority: Medium    Hypertriglyceridemia 01/30/2014     Priority: Medium    Plantar fasciitis 01/30/2014     Priority: Medium    Asthma, mild intermittent      Priority: Medium            History of Present Illness:   Gonzales Melo presents for follow up of rheumatoid arthritis.  I last evaluated the patient in 4-2024, and at that time, inflammatory arthritis was adequately controlled.  Recommended continuing 25 mg methotrexate per week, and adalimumab q o wk.    Background: Onset polyarthritis 2019.  Methotrexate monotherapy 2019 through 2021.  Adalimumab added October 2021.    Interval history December 12, 2024    He notes \"fatigue\" in arms/hands associated with prolonged gripping/holding/lifting. Sometimes is forced to take a break from activity such as wood-cutting, every 20-30 mins.   There is associated pain in finger joints, including DIPS and L thumb. 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   He tried weight-lifting to strngthen his hands-he quit because it hurt.  Early morning stiffness is 30 minutes.  No joint swelling/warmth/redness.    He has continued humira. No AE.  Takes 10 tabs methotrexate weekly, no adverse effects.      Interval history April 4, 2024    He reports moderate, recurrent pain in many fingers and knuckles. 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 No visible swelling, redness.  Pain is not of sufficient severity that he thinks of using over-the-counter remedies to help.  Early morning stiffness is not prominent.  Stiffness in both knees, most noticeable for several minutes after immobility and sitting.  Relieved with activity.  There is stable stiffness in toes and ankles, noticeable after sitting. After moving,     He has continued humira. No AE.  Takes 10 tabs methotrexate weekly, no adverse effects.      Interval history August 10, 2023    Reports daily but mild, non-disruptive pain. Infrequently Hard to \"get up\" with feet, ankles, toes, and stiffness in the hands. Stiffness lasts 30 minutes. He sometimes notes difficulty with holding pressure on a wound or starting IV.  Limitation in activities is minor, and has noticed less than once monthly.  In the past month, diminished morning symptoms in association with use of low-carb diet.    He had a TIA in 6-22. He started  daily and has had no recurrence of symptoms.    Had an episode of R foot pain several weeks ago after injuring his R forefoot and toe.  Symptoms completely resolved.    He has continued humira since last visit; he feels that medication helped \"modestly\".  No AE.  Takes 10 tabs methotrexate weekly    Interval history 10-:    Overall he is well, working full-time.  He notes 1-2/10 pain in the fingers and knuckles, and fingers feel \"thick\". Pain is continuous, not really " "morning predominant. Sometimes wrists are involved. Feet sometimes stiff. Hands have been swelling more for the past couple months. No redness/warmth of joints; sensation involves phalanges between joints.   Former discomfort after immobility is less intense.    Uzses 25 mg methotrexate weekly. No AE.  No EtOH intake.    No fatiigue. Low energy.    Interval history 07-:  He is better.    He had a back injury at work on 7-13-20. He started a week of prednisone therapy. Since then, indeed, even since before he started taking the prednisone, he has noted marked reduction in bialteral sole of foot pain. Prednisone stopped over a week ago. Symptom intensity is down to 2 or 3.  Minimal morning pain.  And minimal pain with completing a full shift at work.  He has been taking increased methotrexate 10 tabs weekly for at least 8 weeks. No AE from methotrexate. Minimal morning stiffness. No swollen or tender joints.    He did see Podiatry; he has continued to wear orthotics, but he does not think they have been effective without the methotrexate.    Prior history:    Interval history, 4-28-20:    He reports doing \"ok\". He has not noted any improvement (< 5%) in his joints since starting methotrexate in 1-2020. He take 6 tabs once weekly, no difficulty. He still notes significant pain, after periods of immobility, in feet and ankles initially. He is able to walk off the pain by moving around, but it recurs as soon as he sits for awhile. Pain is on the soles and the sides of his feet; it also goes across the tops of the feet and in the base of his toes. No swelling. Mornings, he is stiff for ~ 10 minutes. 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 He is not using NSAIDs or OTC tylenol.    He recently went for a 3 mile walk; he had a tough time finishing the walk due to foot/ankle pain.     His knees give him pain, but more modest than in the feet. He attributes this pain to \"wear and tear\". UE joints are " ok apart from stiffness in the fingers.    He has been to Podiatry, and has been given shoe inserts; he has had no help from the inserts, either.         Review of Systems:       Constitutional: negative  Skin: negative  Eyes: negative  Ears/Nose/Throat: negative  Respiratory: No shortness of breath, dyspnea on exertion, cough, or hemoptysis  Cardiovascular: negative  Gastrointestinal: negative  Genitourinary: negative  Musculoskeletal: hpi  Neurologic: negative  Psychiatric: negative  Hematologic/Lymphatic/Immunologic: negative  Endocrine: negative          Past Medical History:     Past Medical History:   Diagnosis Date    Arthritis     RA    Asthma, mild intermittent     Obesity 1/30/2014    Plantar fasciitis 1/30/2014     Past Surgical History:   Procedure Laterality Date    COLONOSCOPY  9/30/2015    Dr. Son [facility]    COLONOSCOPY N/A 9/30/2015    Procedure: COLONOSCOPY;  Surgeon: Marvin Son MD;  Location:  GI     --recurrent asthma since the patient was in his mid 20s.  He has been treated with intermittent prednisone bursts 2-3 times per year.  He has never been intubated or hospitalized for asthma.  -Pyelonephritis as a teenager  - Sciatica with a right sided lumbar herniated disc.  --Seropositive rheumatoid arthritis: Recurrent symptoms in late 2019.  Started methotrexate with inadequate response at low-dose.  Had complete response with 25 mg methotrexate weekly in summer 2020.       Social History:     Social History     Occupational History    Not on file   Tobacco Use    Smoking status: Never    Smokeless tobacco: Never   Vaping Use    Vaping status: Never Used   Substance and Sexual Activity    Alcohol use: No    Drug use: No    Sexual activity: Yes     Partners: Female   Patient works as a perioperative nurse. He does not smoke.  He drinks no alcohol.  He denies frequent consumption of crustaceans or organ meats, and does not use aspirin or significant amounts of caffeine.         Family  History:     Family History   Problem Relation Age of Onset    Family History Negative Mother     Hypertension Maternal Grandmother     Rheumatoid Arthritis Maternal Grandmother     Diabetes Maternal Grandmother     Coronary Artery Disease Maternal Grandfather     Heart Disease Brother     Glaucoma No family hx of     Macular Degeneration No family hx of      Maternal grandmother had rheumatoid arthritis from early 20s and cerebrovascular accident.  Maternal grandfather had coronary artery disease with an MI at age 55.         Allergies:     Allergies   Allergen Reactions    Guaifenesin Er Other (See Comments) and Hives     lip swelling    Solbar Pf Spf15 Dermatitis, Itching and Rash            Medications:     Current Outpatient Medications   Medication Sig Dispense Refill    adalimumab (HUMIRA *CF*) 40 MG/0.4ML prefilled syringe kit Inject 0.4 mLs (40 mg) subcutaneously every 14 days. 1 each 10    albuterol (PROAIR HFA/PROVENTIL HFA/VENTOLIN HFA) 108 (90 Base) MCG/ACT inhaler Inhale 1-2 puffs into the lungs every 4 hours as needed for shortness of breath or wheezing 18 g 1    aspirin (ASA) 325 MG EC tablet       atorvastatin (LIPITOR) 40 MG tablet TAKE 1 TABLET BY MOUTH AT BEDTIME 90 tablet 3    ipratropium - albuterol 0.5 mg/2.5 mg/3 mL (DUONEB) 0.5-2.5 (3) MG/3ML neb solution Take 1 vial (3 mLs) by nebulization every 6 hours as needed for shortness of breath 30 mL 1    methotrexate 2.5 MG tablet Take 10 tablets (25 mg) by mouth every 7 days. 120 tablet 3            Physical Exam:   Blood pressure 134/79, pulse 78, resp. rate 20, weight 148.3 kg (327 lb), SpO2 96%.  Wt Readings from Last 4 Encounters:   12/12/24 148.3 kg (327 lb)   04/04/24 141.3 kg (311 lb 8 oz)   08/10/23 136.1 kg (300 lb)   08/07/23 136.5 kg (301 lb)     Constitutional: well-developed, severely obese, cooperative  Eyes: nl EOM, PERRLA, vision, conjunctiva, sclera  ENT: nl external ears, nose, hearing, lips, teeth, gums, throat  No mucous  membrane lesions, normal saliva pool  Neck: no mass or thyroid enlargement  Lungs: Breathing unlabored  MS: Mild tenderness without synovial thickening palpable at multiple MTPs in both feet.  Fist formation is excellent.  No palpable synovitis at MCPs.  Range of motion in wrists, elbows, knees and shoulders is normal.  Skin: no nail pitting, alopecia, rash, nodules or lesions  Neuro: nl cranial nerves, strength, sensation, DTRs.   Psych: nl judgement, orientation, memory, affect.         Data:     No results found for any visits on 12/12/24.        Latest Ref Rng & Units 11/3/2023     9:42 AM 3/19/2024     1:48 PM 9/14/2024     1:50 PM   RHEUM RESULTS   Albumin 3.5 - 5.2 g/dL 4.1  4.3  3.9    ALT 0 - 70 U/L 39  46  49    AST 0 - 45 U/L 25  20  27    Creatinine 0.67 - 1.17 mg/dL 0.91  0.81  0.88    CRP Inflammation <5.00 mg/L <3.00      GFR Estimate >60 mL/min/1.73m2 >90  >90  >90    Hematocrit 40.0 - 53.0 % 43.5  41.6  42.9    Hemoglobin 13.3 - 17.7 g/dL 14.6  14.1  14.8    WBC 4.0 - 11.0 10e3/uL 7.4  8.0  6.8    RBC Count 4.40 - 5.90 10e6/uL 4.65  4.44  4.66    RDW 10.0 - 15.0 % 13.8  14.6  13.7    MCHC 31.5 - 36.5 g/dL 33.6  33.9  34.5    MCV 78 - 100 fL 94  94  92    Platelet Count 150 - 450 10e3/uL 316  317  291        Rheumatoid Factor   Date Value Ref Range Status   12/23/2019 44 (H) <12 IU/mL Final   ,   Cyclic Citrullinated Peptide Antibody, IgG   Date Value Ref Range Status   01/15/2020 177 (H) <7 U/mL Final     Comment:     Positive     LONA Screen by EIA   Date Value Ref Range Status   09/16/2004 <1.0  Interpretation:  Negative  Final         Latest Ref Rng & Units 11/3/2023     9:42 AM 3/19/2024     1:48 PM 9/14/2024     1:50 PM   RHEUM RESULTS   Albumin 3.5 - 5.2 g/dL 4.1  4.3  3.9    ALT 0 - 70 U/L 39  46  49    AST 0 - 45 U/L 25  20  27    Creatinine 0.67 - 1.17 mg/dL 0.91  0.81  0.88    CRP Inflammation <5.00 mg/L <3.00      GFR Estimate >60 mL/min/1.73m2 >90  >90  >90    Hematocrit 40.0 - 53.0 %  43.5  41.6  42.9    Hemoglobin 13.3 - 17.7 g/dL 14.6  14.1  14.8    WBC 4.0 - 11.0 10e3/uL 7.4  8.0  6.8    RBC Count 4.40 - 5.90 10e6/uL 4.65  4.44  4.66    RDW 10.0 - 15.0 % 13.8  14.6  13.7    MCHC 31.5 - 36.5 g/dL 33.6  33.9  34.5    MCV 78 - 100 fL 94  94  92    Platelet Count 150 - 450 10e3/uL 316  317  291        Rheumatoid Factor   Date Value Ref Range Status   12/23/2019 44 (H) <12 IU/mL Final   ,   Cyclic Citrullinated Peptide Antibody, IgG   Date Value Ref Range Status   01/15/2020 177 (H) <7 U/mL Final     Comment:     Positive   ,  ,  ,  ,  ,  ,   LONA Screen by EIA   Date Value Ref Range Status   09/16/2004 <1.0  Interpretation:  Negative  Final     Reviewed Rheumatology lab flowsheet

## 2024-12-12 ENCOUNTER — OFFICE VISIT (OUTPATIENT)
Dept: RHEUMATOLOGY | Facility: CLINIC | Age: 54
End: 2024-12-12
Payer: COMMERCIAL

## 2024-12-12 VITALS
HEART RATE: 78 BPM | OXYGEN SATURATION: 96 % | DIASTOLIC BLOOD PRESSURE: 79 MMHG | BODY MASS INDEX: 42.56 KG/M2 | SYSTOLIC BLOOD PRESSURE: 134 MMHG | WEIGHT: 315 LBS | RESPIRATION RATE: 20 BRPM

## 2024-12-12 DIAGNOSIS — R76.8 RHEUMATOID FACTOR POSITIVE WITH CYCLIC CITRULLINATED PEPTIDE (CCP) ANTIBODY NEGATIVE: ICD-10-CM

## 2024-12-12 RX ORDER — ADALIMUMAB 40MG/0.4ML
40 KIT SUBCUTANEOUS
Qty: 1 EACH | Refills: 10 | Status: SHIPPED | OUTPATIENT
Start: 2024-12-12

## 2024-12-12 RX ORDER — METHOTREXATE 2.5 MG/1
25 TABLET ORAL
Qty: 120 TABLET | Refills: 3 | Status: SHIPPED | OUTPATIENT
Start: 2024-12-12

## 2024-12-12 ASSESSMENT — PAIN SCALES - GENERAL: PAINLEVEL_OUTOF10: MILD PAIN (2)

## 2024-12-12 NOTE — PATIENT INSTRUCTIONS
"Diagnosis:  1.  Seropositive rheumatoid arthritis: Low disease activity on combination medication.  I recommend continuing adalimumab and methotrexate.  2.  Activity associated pain in hands and feet.  Most likely cause is early osteoarthritis with \"gelling phenomenon\".    Plan:  1.  Continue methotrexate 25 mg orally once weekly. While on methotrexate:   -- Check labs every 4 months (AST/ALT, Albumin, CBC with platelets)   -- Limit alcohol intake to 2 drinks weekly; use folate 1 mg daily (or 2 multivitamins)  --Tylenol 500-1000 mg can be used as needed up to three times daily for nausea/headache associated with dosing.    2.   Continue adalimumab 40 mg subcutaneously once every other week.  3.  Utilize acetaminophen 1000 mg up to 3 times daily as needed for residual joint pain, especially pain associated with activity.  Try using acetaminophen as a preventative pain reliever when anticipating repetitive use of the hands.  4.  Monitor for morning stiffness and stiffness after immobility in the feet.  If stiffness worsens, or if inflammatory markers in the blood indicate uncontrolled inflammation, would consider substitution of Orencia for current antirheumatic medications.  "

## 2024-12-12 NOTE — NURSING NOTE
Chief Complaint   Patient presents with    RECHECK     Rheumatoid factor positive with cyclic citrullinated peptide (CCP) antibody negative       Vitals:    12/12/24 0723   BP: 134/79   BP Location: Left arm   Patient Position: Sitting   Cuff Size: Adult Large   Pulse: 78   Resp: 20   SpO2: 96%   Weight: 148.3 kg (327 lb)       Body mass index is 42.56 kg/m .      Clarence Schultz MA

## 2024-12-16 ENCOUNTER — TELEPHONE (OUTPATIENT)
Dept: RHEUMATOLOGY | Facility: CLINIC | Age: 54
End: 2024-12-16
Payer: COMMERCIAL

## 2024-12-16 NOTE — TELEPHONE ENCOUNTER
MTM referral from: [facility] visit (referral by provider)    MTM referral outreach attempt #2 on December 16, 2024 at 3:18 PM      Outcome: Patient not reachable after several attempts, routed to Pharmacist Team/Provider as an FYI    Use HB for the carrier/Plan on the flowsheet      [employer] Message Sent    SCOTT Redmond       Patient called back and scheduled visit.

## 2024-12-23 ENCOUNTER — VIRTUAL VISIT (OUTPATIENT)
Dept: PHARMACY | Facility: CLINIC | Age: 54
End: 2024-12-23
Attending: INTERNAL MEDICINE
Payer: COMMERCIAL

## 2024-12-23 DIAGNOSIS — R76.8 RHEUMATOID FACTOR POSITIVE WITH CYCLIC CITRULLINATED PEPTIDE (CCP) ANTIBODY NEGATIVE: ICD-10-CM

## 2024-12-23 DIAGNOSIS — Z71.85 VACCINE COUNSELING: ICD-10-CM

## 2024-12-23 DIAGNOSIS — J45.20 MILD INTERMITTENT ASTHMA WITHOUT COMPLICATION: ICD-10-CM

## 2024-12-23 DIAGNOSIS — E78.5 HYPERLIPIDEMIA, UNSPECIFIED HYPERLIPIDEMIA TYPE: ICD-10-CM

## 2024-12-23 DIAGNOSIS — G45.9 TIA (TRANSIENT ISCHEMIC ATTACK): ICD-10-CM

## 2024-12-23 DIAGNOSIS — M06.9 RHEUMATOID ARTHRITIS, INVOLVING UNSPECIFIED SITE, UNSPECIFIED WHETHER RHEUMATOID FACTOR PRESENT (H): Primary | ICD-10-CM

## 2024-12-23 DIAGNOSIS — Z78.9 TAKES DIETARY SUPPLEMENTS: ICD-10-CM

## 2024-12-23 RX ORDER — CYANOCOBALAMIN (VITAMIN B-12) 500 MCG
400 LOZENGE ORAL DAILY
COMMUNITY

## 2024-12-23 RX ORDER — ASPIRIN 81 MG/1
81 TABLET ORAL DAILY
COMMUNITY

## 2024-12-23 RX ORDER — ADALIMUMAB 40MG/0.4ML
40 KIT SUBCUTANEOUS
Qty: 1 EACH | Refills: 10 | Status: SHIPPED | OUTPATIENT
Start: 2024-12-23

## 2024-12-23 RX ORDER — ASCORBIC ACID 500 MG
1000 TABLET ORAL DAILY
COMMUNITY

## 2024-12-23 NOTE — PROGRESS NOTES
Medication Therapy Management (MTM) Encounter    ASSESSMENT:                            Medication Adherence/Access: No issues identified.    Rheumatoid arthritis: Stable.  Not currently having any issues with getting Humira.  Discussed that I can help with prior authorization renewals and making sure his refills are up-to-date.  Discussed I can help with troubleshooting the co-pay card problems but the drug company will not talk to me, he unfortunately will still have to make those phone calls    Vaccinations: May be beneficial to check hepatitis B antibody to see if he would benefit from hepatitis B vaccination.  Will discuss with rheumatology if this would be ordered by Dr. Vaz or by primary care provider    Hyperlipidemia/history of TIA: Stable.    Asthma: Stable.     Supplements: Stable.    PLAN:                            I will review with Dr. Vaz if we can order a Hepatitis B antibody lab, or if you would need to talk to your primary care provider about this.  This lab would help tell us if you are immune to hepatitis B and if you may benefit from receiving the hepatitis B series again.   - Verbal OK given from Dr. Vaz to order HbVsAg and HBVsAb     Follow-up: MTM follow-up around 8/30/2025    SUBJECTIVE/OBJECTIVE:                          Sylvain Melo is a 54 year old male seen for an initial visit. He was referred to me from Dr. Vaz.      Reason for visit: Referral for help with Humira renewal through insurance and help with cost.    Allergies/ADRs: Reviewed in chart  Past Medical History: Reviewed in chart  Tobacco: He reports that he has never smoked. He has never used smokeless tobacco.  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  -This patient's Humira prescription was approved on 10/3/2024.  [facility]    At the beginning of the year his copay card gets renewed    Rheumatoid arthritis  - Humira 40 mg  every 14 days  - Methotrexate 25 mg every 7 days  - Folic acid 1.6 mg daily between his folic acid and multivitamin supplements  The medications do help maintain his symptoms.  Does have pain and some stiffness on a daily basis.  Will notice more joint pain if he is very active at home.  Overall feels the pain has been manageable.  Feels the Humira and methotrexate have been effective  Patient reports issues year to year with getting the prior authorization completed and then also with communicating with the  coupon card.    Last lab monitoring completed: 9/14/2024  Reviewed baseline pre-biologic screening.   Hep C antibody non-reactive   Hep B surface antigen non-reactive   Quantiferon TB Negative   HIV antigen non-reactive     Vaccinations:  Up to date on Influenza, PCV20, Shingrix, and TDaP. 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  This is not showing up in [facility].  Eligible for COVID-19.  Declines COVID boosters.  Is concerned that his recent TIA could have been triggered by the COVID booster.  Does not plan to get these in the future    Hyperlipidemia /History of TIA:  -Atorvastatin 40 mg daily  - Aspirin 81 mg daily, for secondary prevention (had TIA)  Patient reports no significant myalgias or other side effects.     Asthma   -Albuterol HFA as needed  -DuoNebs as needed  Uses just as needed, rarely need them.  Typically just when he gets sick.  The inhalers are effective when needed  Patient reports no current medication side effects.         Supplements:   - B complex 2 tablets once daily (contains 800 mcg of folic acid total per 2 tabs)  - Vitamin E 400 units daily  - Vitamin C 1000 mg with komal hips  - Magnesium glycinate 500 mg daily  Denies issues with this    Today's Vitals: There were no vitals taken for this visit.  ----------------    I spent 25 minutes with this patient today. All changes were made via collaborative practice agreement with Moisés Vaz  Sandeep.     A summary of these recommendations was sent via [employer].    Atiya Rea, GómezD  Medication Therapy Management Pharmacist  [facility]    Telemedicine Visit Details  The patient's medications can be safely assessed via a telemedicine encounter.  Type of service:  Telephone visit  Originating Location (pt. Location): Home    Distant Location (provider location):  Off-site  Start Time:  3 PM  End Time:  3:25 PM     Medication Therapy Recommendations  No medication therapy recommendations to display

## 2024-12-23 NOTE — PATIENT INSTRUCTIONS
"Recommendations from today's MTM visit:                                                      I will review with Dr. Vaz if we can order a Hepatitis B antibody lab, or if you would need to talk to your primary care provider about this.  This lab would help tell us if you are immune to hepatitis B and if you may benefit from receiving the hepatitis B series again.    Follow-up: MTM follow-up around 8/30/2025    It was great speaking with you today.  I value your experience and would be very thankful for your time in providing feedback in our clinic survey. In the next few days, you may receive an email or text message from [employer] with a link to a survey related to your  clinical pharmacist.\"     To schedule another MTM appointment, please call the clinic directly or you may call the MTM scheduling line at [phone].    My Clinical Pharmacist's contact information:                                                      Please feel free to contact me with any questions or concerns you have.      Atiya Rea, PharmD  Medication Therapy Management Pharmacist  [facility]     "

## 2025-01-17 ENCOUNTER — LAB (OUTPATIENT)
Dept: LAB | Facility: CLINIC | Age: 55
End: 2025-01-17
Payer: COMMERCIAL

## 2025-01-17 DIAGNOSIS — M06.9 RHEUMATOID ARTHRITIS, INVOLVING UNSPECIFIED SITE, UNSPECIFIED WHETHER RHEUMATOID FACTOR PRESENT (H): ICD-10-CM

## 2025-01-17 DIAGNOSIS — R76.8 RHEUMATOID FACTOR POSITIVE WITH CYCLIC CITRULLINATED PEPTIDE (CCP) ANTIBODY NEGATIVE: ICD-10-CM

## 2025-01-17 LAB
ALBUMIN SERPL BCG-MCNC: 4.1 G/DL (ref 3.5–5.2)
ALT SERPL W P-5'-P-CCNC: 48 U/L (ref 0–70)
AST SERPL W P-5'-P-CCNC: 34 U/L (ref 0–45)
CREAT SERPL-MCNC: 0.97 MG/DL (ref 0.67–1.17)
CRP SERPL-MCNC: <3 MG/L
EGFRCR SERPLBLD CKD-EPI 2021: >90 ML/MIN/1.73M2
ERYTHROCYTE [DISTWIDTH] IN BLOOD BY AUTOMATED COUNT: 13.6 % (ref 10–15)
ERYTHROCYTE [SEDIMENTATION RATE] IN BLOOD BY WESTERGREN METHOD: 7 MM/HR (ref 0–20)
HCT VFR BLD AUTO: 44 % (ref 40–53)
HGB BLD-MCNC: 15.2 G/DL (ref 13.3–17.7)
MCH RBC QN AUTO: 31.5 PG (ref 26.5–33)
MCHC RBC AUTO-ENTMCNC: 34.5 G/DL (ref 31.5–36.5)
MCV RBC AUTO: 91 FL (ref 78–100)
PLATELET # BLD AUTO: 285 10E3/UL (ref 150–450)
RBC # BLD AUTO: 4.82 10E6/UL (ref 4.4–5.9)
WBC # BLD AUTO: 6.8 10E3/UL (ref 4–11)

## 2025-01-17 PROCEDURE — 86140 C-REACTIVE PROTEIN: CPT

## 2025-01-17 PROCEDURE — 82040 ASSAY OF SERUM ALBUMIN: CPT

## 2025-01-17 PROCEDURE — 85652 RBC SED RATE AUTOMATED: CPT

## 2025-01-17 PROCEDURE — 87340 HEPATITIS B SURFACE AG IA: CPT

## 2025-01-17 PROCEDURE — 86706 HEP B SURFACE ANTIBODY: CPT

## 2025-01-17 PROCEDURE — 84460 ALANINE AMINO (ALT) (SGPT): CPT

## 2025-01-17 PROCEDURE — 82565 ASSAY OF CREATININE: CPT

## 2025-01-17 PROCEDURE — 85027 COMPLETE CBC AUTOMATED: CPT

## 2025-01-17 PROCEDURE — 84450 TRANSFERASE (AST) (SGOT): CPT

## 2025-01-17 PROCEDURE — 36415 COLL VENOUS BLD VENIPUNCTURE: CPT

## 2025-01-18 LAB
HBV SURFACE AB SERPL IA-ACNC: 28.8 M[IU]/ML
HBV SURFACE AB SERPL IA-ACNC: REACTIVE M[IU]/ML
HBV SURFACE AG SERPL QL IA: NONREACTIVE

## 2025-02-03 ENCOUNTER — PATIENT OUTREACH (OUTPATIENT)
Dept: CARE COORDINATION | Facility: CLINIC | Age: 55
End: 2025-02-03
Payer: COMMERCIAL

## 2025-02-03 DIAGNOSIS — J45.20 MILD INTERMITTENT ASTHMA WITHOUT COMPLICATION: ICD-10-CM

## 2025-02-03 RX ORDER — ALBUTEROL SULFATE 90 UG/1
1-2 INHALANT RESPIRATORY (INHALATION) EVERY 4 HOURS PRN
Qty: 18 G | Refills: 1 | OUTPATIENT
Start: 2025-02-03

## 2025-02-04 NOTE — TELEPHONE ENCOUNTER
LVM for patient to call the clinic at [phone] to schedule an appt before refills can be filled. It has been almost 2 years since last visit.  Thania Frey TC

## 2025-03-02 SDOH — HEALTH STABILITY: PHYSICAL HEALTH: ON AVERAGE, HOW MANY DAYS PER WEEK DO YOU ENGAGE IN MODERATE TO STRENUOUS EXERCISE (LIKE A BRISK WALK)?: 0 DAYS

## 2025-03-02 SDOH — HEALTH STABILITY: PHYSICAL HEALTH: ON AVERAGE, HOW MANY MINUTES DO YOU ENGAGE IN EXERCISE AT THIS LEVEL?: 0 MIN

## 2025-03-02 ASSESSMENT — ASTHMA QUESTIONNAIRES
QUESTION_1 LAST FOUR WEEKS HOW MUCH OF THE TIME DID YOUR ASTHMA KEEP YOU FROM GETTING AS MUCH DONE AT WORK, SCHOOL OR AT HOME: NONE OF THE TIME
QUESTION_5 LAST FOUR WEEKS HOW WOULD YOU RATE YOUR ASTHMA CONTROL: WELL CONTROLLED
ACT_TOTALSCORE: 21
QUESTION_3 LAST FOUR WEEKS HOW OFTEN DID YOUR ASTHMA SYMPTOMS (WHEEZING, COUGHING, SHORTNESS OF BREATH, CHEST TIGHTNESS OR PAIN) WAKE YOU UP AT NIGHT OR EARLIER THAN USUAL IN THE MORNING: NOT AT ALL
QUESTION_2 LAST FOUR WEEKS HOW OFTEN HAVE YOU HAD SHORTNESS OF BREATH: ONCE OR TWICE A WEEK
ACT_TOTALSCORE: 21
QUESTION_4 LAST FOUR WEEKS HOW OFTEN HAVE YOU USED YOUR RESCUE INHALER OR NEBULIZER MEDICATION (SUCH AS ALBUTEROL): TWO OR THREE TIMES PER WEEK

## 2025-03-02 ASSESSMENT — SOCIAL DETERMINANTS OF HEALTH (SDOH): HOW OFTEN DO YOU GET TOGETHER WITH FRIENDS OR RELATIVES?: PATIENT DECLINED

## 2025-03-06 ENCOUNTER — OFFICE VISIT (OUTPATIENT)
Dept: FAMILY MEDICINE | Facility: CLINIC | Age: 55
End: 2025-03-06
Payer: COMMERCIAL

## 2025-03-06 VITALS
HEART RATE: 96 BPM | HEIGHT: 73 IN | DIASTOLIC BLOOD PRESSURE: 75 MMHG | WEIGHT: 315 LBS | OXYGEN SATURATION: 96 % | BODY MASS INDEX: 41.75 KG/M2 | RESPIRATION RATE: 20 BRPM | SYSTOLIC BLOOD PRESSURE: 122 MMHG | TEMPERATURE: 98.9 F

## 2025-03-06 DIAGNOSIS — L98.9 SKIN LESION: ICD-10-CM

## 2025-03-06 DIAGNOSIS — Z00.00 ROUTINE GENERAL MEDICAL EXAMINATION AT A HEALTH CARE FACILITY: Primary | ICD-10-CM

## 2025-03-06 DIAGNOSIS — E78.1 HYPERTRIGLYCERIDEMIA: ICD-10-CM

## 2025-03-06 DIAGNOSIS — J45.20 MILD INTERMITTENT ASTHMA WITHOUT COMPLICATION: ICD-10-CM

## 2025-03-06 DIAGNOSIS — E66.813 CLASS 3 SEVERE OBESITY WITH BODY MASS INDEX (BMI) OF 40.0 TO 44.9 IN ADULT, UNSPECIFIED OBESITY TYPE, UNSPECIFIED WHETHER SERIOUS COMORBIDITY PRESENT (H): ICD-10-CM

## 2025-03-06 DIAGNOSIS — R73.9 ELEVATED BLOOD SUGAR: ICD-10-CM

## 2025-03-06 DIAGNOSIS — Z12.11 SCREEN FOR COLON CANCER: ICD-10-CM

## 2025-03-06 DIAGNOSIS — E66.01 CLASS 3 SEVERE OBESITY WITH BODY MASS INDEX (BMI) OF 40.0 TO 44.9 IN ADULT, UNSPECIFIED OBESITY TYPE, UNSPECIFIED WHETHER SERIOUS COMORBIDITY PRESENT (H): ICD-10-CM

## 2025-03-06 DIAGNOSIS — G45.9 TIA (TRANSIENT ISCHEMIC ATTACK): ICD-10-CM

## 2025-03-06 DIAGNOSIS — M06.9 RHEUMATOID ARTHRITIS, INVOLVING UNSPECIFIED SITE, UNSPECIFIED WHETHER RHEUMATOID FACTOR PRESENT (H): ICD-10-CM

## 2025-03-06 LAB
EST. AVERAGE GLUCOSE BLD GHB EST-MCNC: 111 MG/DL
HBA1C MFR BLD: 5.5 % (ref 0–5.6)

## 2025-03-06 RX ORDER — IPRATROPIUM BROMIDE AND ALBUTEROL SULFATE 2.5; .5 MG/3ML; MG/3ML
3 SOLUTION RESPIRATORY (INHALATION) EVERY 6 HOURS PRN
Qty: 30 ML | Refills: 3 | Status: SHIPPED | OUTPATIENT
Start: 2025-03-06

## 2025-03-06 RX ORDER — ATORVASTATIN CALCIUM 40 MG/1
40 TABLET, FILM COATED ORAL AT BEDTIME
Qty: 90 TABLET | Refills: 3 | Status: SHIPPED | OUTPATIENT
Start: 2025-03-06

## 2025-03-06 RX ORDER — ALBUTEROL SULFATE 90 UG/1
1-2 INHALANT RESPIRATORY (INHALATION) EVERY 4 HOURS PRN
Qty: 18 G | Refills: 3 | Status: SHIPPED | OUTPATIENT
Start: 2025-03-06

## 2025-03-06 ASSESSMENT — PAIN SCALES - GENERAL: PAINLEVEL_OUTOF10: NO PAIN (0)

## 2025-03-06 NOTE — PATIENT INSTRUCTIONS
Patient Education   Preventive Care Advice   This is general advice given by our system to help you stay healthy. However, your care team may have specific advice just for you. Please talk to your care team about your preventive care needs.  Nutrition  Eat 5 or more servings of fruits and vegetables each day.  Try wheat bread, brown rice and whole grain pasta (instead of white bread, rice, and pasta).  Get enough calcium and vitamin D. Check the label on foods and aim for 100% of the RDA (recommended daily allowance).  Lifestyle  Exercise at least 150 minutes each week  (30 minutes a day, 5 days a week).  Do muscle strengthening activities 2 days a week. These help control your weight and prevent disease.  No smoking.  Wear sunscreen to prevent skin cancer.  Have a dental exam and cleaning every 6 months.  Yearly exams  See your health care team every year to talk about:  Any changes in your health.  Any medicines your care team has prescribed.  Preventive care, family planning, and ways to prevent chronic diseases.  Shots (vaccines)   HPV shots (up to age 26), if you've never had them before.  Hepatitis B shots (up to age 59), if you've never had them before.  COVID-19 shot: Get this shot when it's due.  Flu shot: Get a flu shot every year.  Tetanus shot: Get a tetanus shot every 10 years.  Pneumococcal, hepatitis A, and RSV shots: Ask your care team if you need these based on your risk.  Shingles shot (for age 50 and up)  General health tests  Diabetes screening:  Starting at age 35, Get screened for diabetes at least every 3 years.  If you are younger than age 35, ask your care team if you should be screened for diabetes.  Cholesterol test: At age 39, start having a cholesterol test every 5 years, or more often if advised.  Bone density scan (DEXA): At age 50, ask your care team if you should have this scan for osteoporosis (brittle bones).  Hepatitis C: Get tested at least once in your life.  STIs (sexually  transmitted infections)  Before age 24: Ask your care team if you should be screened for STIs.  After age 24: Get screened for STIs if you're at risk. You are at risk for STIs (including HIV) if:  You are sexually active with more than one person.  You don't use condoms every time.  You or a partner was diagnosed with a sexually transmitted infection.  If you are at risk for HIV, ask about PrEP medicine to prevent HIV.  Get tested for HIV at least once in your life, whether you are at risk for HIV or not.  Cancer screening tests  Cervical cancer screening: If you have a cervix, begin getting regular cervical cancer screening tests starting at age 21.  Breast cancer scan (mammogram): If you've ever had breasts, begin having regular mammograms starting at age 40. This is a scan to check for breast cancer.  Colon cancer screening: It is important to start screening for colon cancer at age 45.  Have a colonoscopy test every 10 years (or more often if you're at risk) Or, ask your provider about stool tests like a FIT test every year or Cologuard test every 3 years.  To learn more about your testing options, visit:   .  For help making a decision, visit:   https://bit.ly/uj88300.  Prostate cancer screening test: If you have a prostate, ask your care team if a prostate cancer screening test (PSA) at age 55 is right for you.  Lung cancer screening: If you are a current or former smoker ages 50 to 80, ask your care team if ongoing lung cancer screenings are right for you.  For informational purposes only. Not to replace the advice of your health care provider. Copyright   2023 [employer]. All rights reserved. Clinically reviewed by the [facility] Transitions Program. BULX [id number] - REV 01/24.

## 2025-03-06 NOTE — PROGRESS NOTES
"Rarely uses albuterol except Preventive Care Visit  [facility]  JED Carver CNP, Family Medicine  Mar 6, 2025      Assessment & Plan     Routine general medical examination at a health care facility  Reviewed age and gender appropriate screenings and lifestyle modifications with patient.     Elevated blood sugar  Monitor, asymptomatic, treat on A1c  - HEMOGLOBIN A1C    Hypertriglyceridemia  On statin secondary to TIA  - Lipid panel reflex to direct LDL Non-fasting    Mild intermittent asthma without complication  Stable chronic, refills  - ipratropium - albuterol 0.5 mg/2.5 mg/3 mL (DUONEB) 0.5-2.5 (3) MG/3ML neb solution  Dispense: 30 mL; Refill: 3  - albuterol (PROAIR HFA/PROVENTIL HFA/VENTOLIN HFA) 108 (90 Base) MCG/ACT inhaler  Dispense: 18 g; Refill: 3    TIA (transient ischemic attack)  History, no symptoms, on lipitor  - atorvastatin (LIPITOR) 40 MG tablet  Dispense: 90 tablet; Refill: 3    Rheumatoid arthritis, involving unspecified site, unspecified whether rheumatoid factor present (H)  Manages with rheumatology.   - Comprehensive metabolic panel (BMP + Alb, Alk Phos, ALT, AST, Total. Bili, TP)    Class 3 severe obesity with body mass index (BMI) of 40.0 to 44.9 in adult, unspecified obesity type, unspecified whether serious comorbidity present (H)  Screenings as appropriate    Screen for colon cancer  Due at 55  - Colonoscopy Screening  Referral    Skin lesion  Low suspicion for malignancy, but patient would like to establish with dermatology for annual skin checks. Also interested in destruction of bothersome lesion on lip, so referral placed due to cosmetic location of lesion.   - Adult Dermatology  Referral              BMI  Estimated body mass index is 42.67 kg/m  as calculated from the following:    Height as of this encounter: 1.854 m (6' 1\").    Weight as of this encounter: 146.7 kg (323 lb 6.4 oz).       Counseling  Appropriate preventive " services were addressed with this patient via screening, questionnaire, or discussion as appropriate for fall prevention, nutrition, physical activity, Tobacco-use cessation, social engagement, weight loss and cognition.  Checklist reviewing preventive services available has been given to the patient.  Reviewed patient's diet, addressing concerns and/or questions.   The patient was instructed to see the dentist every 6 months.           Chandana Narayan is a 54 year old, presenting for the following:  Physical (fasting)        3/6/2025     3:38 PM   Additional Questions   Roomed by Stefania   Accompanied by self          HPI           Advance Care Planning  Patient does not have a Health Care Directive: Patient states has Advance Directive and will bring in a copy to clinic.      3/2/2025   General Health   How would you rate your overall physical health? Good   Feel stress (tense, anxious, or unable to sleep) Not at all         3/2/2025   Nutrition   Three or more servings of calcium each day? Yes   Diet: Other   If other, please elaborate: keto   How many servings of fruit and vegetables per day? (!) 2-3   How many sweetened beverages each day? 0-1         3/2/2025   Exercise   Days per week of moderate/strenous exercise 0 days   Average minutes spent exercising at this level 0 min   (!) EXERCISE CONCERN      3/2/2025   Social Factors   Frequency of gathering with friends or relatives Patient declined   Worry food won't last until get money to buy more Patient declined   Food not last or not have enough money for food? Patient declined   Do you have housing? (Housing is defined as stable permanent housing and does not include staying ouside in a car, in a tent, in an abandoned building, in an overnight shelter, or couch-surfing.) Patient declined   Are you worried about losing your housing? Patient declined   Lack of transportation? Patient declined   Unable to get utilities (heat,electricity)? Patient declined  "        3/2/2025   Fall Risk   Fallen 2 or more times in the past year? No   Trouble with walking or balance? No          3/2/2025   Dental   Dentist two times every year? (!) NO            Today's PHQ-2 Score:       3/6/2025     3:25 PM   PHQ-2 ( 1999 [employer])   Q1: Little interest or pleasure in doing things 0   Q2: Feeling down, depressed or hopeless 0   PHQ-2 Score 0    Q1: Little interest or pleasure in doing things Not at all   Q2: Feeling down, depressed or hopeless Not at all   PHQ-2 Score 0       Patient-reported           3/2/2025   Substance Use   Alcohol more than 3/day or more than 7/wk Not Applicable   Do you use any other substances recreationally? No     Social History     Tobacco Use    Smoking status: Never    Smokeless tobacco: Never   Vaping Use    Vaping status: Never Used   Substance Use Topics    Alcohol use: No    Drug use: No           3/2/2025   STI Screening   New sexual partner(s) since last STI/HIV test? No   ASCVD Risk   The 10-year ASCVD risk score (Bianka SWANN, et al., 2019) is: 4.7%    Values used to calculate the score:      Age: 54 years      Sex: Male      Is Non- : No      Diabetic: No      Tobacco smoker: No      Systolic Blood Pressure: 122 mmHg      Is BP treated: No      HDL Cholesterol: 37 mg/dL      Total Cholesterol: 158 mg/dL           Reviewed and updated as needed this visit by Provider   Tobacco  Allergies  Meds  Problems  Med Hx  Surg Hx  Fam Hx              Here for annual exam with the following:  Asthma: Rarely uses albuterol except during viral illness season changes.  Lesion to right of lip: bugging him more, also mole to left breast with changes  Right ear: persistent \"woosh\" with jaw movement, fullness, comes and goes.   Glands swollen-lymph chains for years painless   ROS: 10 point ROS neg other than the symptoms noted above in the HPI.       Objective    Exam  /75 (BP Location: Right arm, Patient Position: Sitting, " "Cuff Size: Adult Large)   Pulse 96   Temp 98.9  F (37.2  C) (Oral)   Resp 20   Ht 1.854 m (6' 1\")   Wt (!) 146.7 kg (323 lb 6.4 oz)   SpO2 96%   BMI 42.67 kg/m     Estimated body mass index is 42.67 kg/m  as calculated from the following:    Height as of this encounter: 1.854 m (6' 1\").    Weight as of this encounter: 146.7 kg (323 lb 6.4 oz).    Physical Exam  Vitals and nursing note reviewed.   Constitutional:       General: He is not in acute distress.     Appearance: Normal appearance. He is obese. He is not ill-appearing, toxic-appearing or diaphoretic.   HENT:      Head: Normocephalic and atraumatic.      Right Ear: Ear canal and external ear normal. A middle ear effusion is present.      Left Ear: Tympanic membrane, ear canal and external ear normal. There is impacted cerumen.      Ears:      Rouse exam findings: Does not lateralize.     Right Rinne: AC > BC.     Left Rinne: AC > BC.     Nose: Nose normal.      Mouth/Throat:      Lips: Pink. Lesions (see 1 on diagram) present.      Mouth: Mucous membranes are moist. No injury, lacerations, oral lesions or angioedema.      Dentition: Normal dentition. Does not have dentures.      Tongue: No lesions. Tongue does not deviate from midline.      Palate: No mass and lesions (see 1 on diagram).      Pharynx: Oropharynx is clear. Uvula midline. No pharyngeal swelling, oropharyngeal exudate, posterior oropharyngeal erythema, uvula swelling or postnasal drip.     Eyes:      General: Lids are normal.      Extraocular Movements: Extraocular movements intact.      Conjunctiva/sclera: Conjunctivae normal.      Pupils: Pupils are equal, round, and reactive to light.   Neck:      Thyroid: No thyroid mass or thyromegaly.   Cardiovascular:      Rate and Rhythm: Normal rate and regular rhythm.      Pulses: Normal pulses.      Heart sounds: Normal heart sounds.   Pulmonary:      Effort: Pulmonary effort is normal.      Breath sounds: Normal breath sounds.   Abdominal:     "  General: Abdomen is protuberant. Bowel sounds are normal. There is no distension.      Palpations: Abdomen is soft. There is no shifting dullness, fluid wave or mass.      Tenderness: There is no abdominal tenderness.      Comments: Exam limited by body habitus   Musculoskeletal:         General: Normal range of motion.      Cervical back: Full passive range of motion without pain, normal range of motion and neck supple.      Right lower leg: No edema.      Left lower leg: No edema.   Lymphadenopathy:      Cervical: Cervical adenopathy present.   Skin:     General: Skin is warm and dry.      Capillary Refill: Capillary refill takes less than 2 seconds.   Neurological:      General: No focal deficit present.      Mental Status: He is alert.   Psychiatric:         Attention and Perception: Attention normal.         Mood and Affect: Mood normal.         Behavior: Behavior normal.         Thought Content: Thought content normal.         Judgment: Judgment normal.               Signed Electronically by: JED Carver CNP

## 2025-03-12 ENCOUNTER — TELEPHONE (OUTPATIENT)
Dept: GASTROENTEROLOGY | Facility: CLINIC | Age: 55
End: 2025-03-12
Payer: COMMERCIAL

## 2025-04-17 ENCOUNTER — OFFICE VISIT (OUTPATIENT)
Dept: DERMATOLOGY | Facility: CLINIC | Age: 55
End: 2025-04-17
Payer: COMMERCIAL

## 2025-04-17 DIAGNOSIS — L82.1 SK (SEBORRHEIC KERATOSIS): ICD-10-CM

## 2025-04-17 DIAGNOSIS — D49.2 NEOPLASM OF UNSPECIFIED BEHAVIOR OF BONE, SOFT TISSUE, AND SKIN: ICD-10-CM

## 2025-04-17 DIAGNOSIS — L91.8 ACROCHORDON: Primary | ICD-10-CM

## 2025-04-17 NOTE — PATIENT INSTRUCTIONS
Proper skin care from [facility]:    -Eliminate harsh soaps as they strip the natural oils from the skin, often resulting in dry itchy skin ( i.e. Dial, Zest, [place] Spring)  -Use mild soaps such as Cetaphil or Dove Sensitive Skin in the shower. You do not need to use soap on arms, legs, and trunk every time you shower unless visibly soiled.   -Avoid hot or cold showers.  -After showering, lightly dry off and apply moisturizing within 2-3 minutes. This will help trap moisture in the skin.   -Aggressive use of a moisturizer at least 1-2 times a day to the entire body (including -Vanicream, Cetaphil, Aquaphor or Cerave) and moisturize hands after every washing.  -We recommend using moisturizers that come in a tub that needs to be scooped out, not a pump. This has more of an oil base. It will hold moisture in your skin much better than a water base moisturizer. The above recommended are non-pore clogging.      Wear a sunscreen with at least SPF 30 on your face, ears, neck and V of the chest daily. Wear sunscreen on other areas of the body if those areas are exposed to the sun throughout the day. Sunscreens can contain physical and/or chemical blockers. Physical blockers are less likely to clog pores, these include zinc oxide and titanium dioxide. Reapply every two hour and after swimming.     Sunscreen examples: https://www.ewg.org/sunscreen/    UV radiation  UVA radiation remains constant throughout the day and throughout the year. It is a longer wavelength than UVB and therefore penetrates deeper into the skin leading to immediate and delayed tanning, photoaging, and skin cancer. 70-80% of UVA and UVB radiation occurs between the hours of 10am-2pm.  UVB radiation  UVB radiation causes the most harmful effects and is more significant during the summer months. However, snow and ice can reflect UVB radiation leading to skin damage during the winter months as well. UVB radiation is responsible for tanning,  burning, inflammation, delayed erythema (pinkness), pigmentation (brown spots), and skin cancer.     I recommend self monthly full body exams and yearly full body exams with a dermatology provider. If you develop a new or changing lesion please follow up for examination. Most skin cancers are pink and scaly or pink and pearly. However, we do see blue/brown/black skin cancers.  Consider the ABCDEs of melanoma when giving yourself your monthly full body exam ( don't forget the groin, buttocks, feet, toes, etc). A-asymmetry, B-borders, C-color, D-diameter, E-elevation or evolving. If you see any of these changes please follow up in clinic. If you cannot see your back I recommend purchasing a hand held mirror to use with a larger wall mirror.       Checking for Skin Cancer  You can find cancer early by checking your skin each month. There are 3 kinds of skin cancer. They are melanoma, basal cell carcinoma, and squamous cell carcinoma. Doing monthly skin checks is the best way to find new marks or skin changes. Follow the instructions below for checking your skin.   The ABCDEs of checking moles for melanoma   Check your moles or growths for signs of melanoma using ABCDE:   Asymmetry: the sides of the mole or growth don t match  Border: the edges are ragged, notched, or blurred  Color: the color within the mole or growth varies  Diameter: the mole or growth is larger than 6 mm (size of a pencil eraser)  Evolving: the size, shape, or color of the mole or growth is changing (evolving is not shown in the images below)    Checking for other types of skin cancer  Basal cell carcinoma or squamous cell carcinoma have symptoms such as:     A spot or mole that looks different from all other marks on your skin  Changes in how an area feels, such as itching, tenderness, or pain  Changes in the skin's surface, such as oozing, bleeding, or scaliness  A sore that does not heal  New swelling or redness beyond the border of a  mole    Who s at risk?  Anyone can get skin cancer. But you are at greater risk if you have:   Fair skin, light-colored hair, or light-colored eyes  Many moles or abnormal moles on your skin  A history of sunburns from sunlight or tanning beds  A family history of skin cancer  A history of exposure to radiation or chemicals  A weakened immune system  If you have had skin cancer in the past, you are at risk for recurring skin cancer.   How to check your skin  Do your monthly skin checkups in front of a full-length mirror. Check all parts of your body, including your:   Head (ears, face, neck, and scalp)  Torso (front, back, and sides)  Arms (tops, undersides, upper, and lower armpits)  Hands (palms, backs, and fingers, including under the nails)  Buttocks and genitals  Legs (front, back, and sides)  Feet (tops, soles, toes, including under the nails, and between toes)  If you have a lot of moles, take digital photos of them each month. Make sure to take photos both up close and from a distance. These can help you see if any moles change over time.   Most skin changes are not cancer. But if you see any changes in your skin, call your doctor right away. Only he or she can diagnose a problem. If you have skin cancer, seeing your doctor can be the first step toward getting the treatment that could save your life.   [employer] last reviewed this educational content on 4/1/2019 2000-2020 [employer]. [address] [place] [address], [place] [address]. All rights reserved. This information is not intended as a substitute for professional medical care. Always follow your healthcare professional's instructions.       When should I call my doctor?  If you are worsening or not improving, please, contact us or seek urgent care as noted below.     Who should I call with questions (adults)?    [facility] [phone]  For urgent needs outside of business hours call the [facility] at  [phone] and ask for the dermatology resident on call to be paged  If this is a medical emergency and you are unable to reach an ER, Call [phone]      If you need a prescription refill, please contact your pharmacy. Refills are approved or denied by our Physicians during normal business hours, Monday through Friday.  Per office policy, refills will not be granted if you have not been seen within the past year (or sooner depending on the condition).

## 2025-04-17 NOTE — LETTER
4/17/2025      Gonzales Melo  [address]  [facility] [address]      Dear Colleague,    Thank you for referring your patient, Gonzales Melo, to the [facility]. Please see a copy of my visit note below.    [facility] Dermatology Note  Encounter Date: Apr 17, 2025  Office Visit     Reviewed patients past medical history and pertinent chart review prior to patients visit today.     Dermatology Problem List:  # NUB, right oral commissure, shave biopsy 4/17/2025     Social history: RN  ____________________________________________    Assessment & Plan:     # Neoplasm of uncertain behavior:  right oral commissure  DDx includes pyogenic granuloma vs BCC vs SCC. Shave biopsy today.    Procedure Note: Biopsy by shave technique  The risks and benefits of the procedure were described to the patient. These include but are not limited to bleeding, infection, scar, incomplete removal, and non-diagnostic biopsy. Verbal informed consent was obtained. The above site(s) was cleansed with an alcohol pad and injected with 1% lidocaine with epinephrine. Once anesthesia was obtained, a biopsy(ies) was performed with Gilette blade. The tissue(s) was placed in a labeled container(s) with formalin and sent to pathology. Hemostasis was achieved with aluminum chloride. Vaseline and a bandage were applied to the wound(s). The patient tolerated the procedure well and was given post biopsy care instructions.    # Seborrheic keratosis, left flank  # Acrochordon, left posterior thigh  - We discussed the benign nature of the skin lesions. No treatment is required. I recommend continued observation with follow up should any concerning changes arise.     Follow up as needed.     All risks, benefits and alternatives were discussed with patient.  Patient is in agreement and understands the assessment and plan.  All questions were answered.  Fanta Jameson PA-C  [facility]  Dermatology  _______________________________________    CC: Derm Problem (1.Little red irritating bump R corner of mouth/2.  Also spot on L chest)    HPI:  Mr. Gonzales Melo is a(n) 54 year old male who presents today as a new patient for three concerns.  First, for years he has noticed a lesion on the right corner of the mouth.  The lesion often scabs and bleeds.  Second, for years he has noticed a lesion on the left flank.  No growth or change over time.  Lesion is asymptomatic.  Finally, he notes a skin tag involving the left posterior thigh.  No personal or family history of skin cancer.  Patient is otherwise feeling well, without additional skin concerns.      Physical Exam:  SKIN: Focused examination of right oral commissure, left flank, and left posterior thigh was performed.  - The right oral commissure demonstrates a pink papule.   - Waxy, stuck on appearing papule involving the left flank, consistent with seborrheic keratosis.   - The left posterior thigh demonstrates a fleshy, pedunculated papule, consistent with an acrochordon.     - No other lesions of concern on areas examined.     Medications:  Current Outpatient Medications   Medication Sig Dispense Refill     adalimumab (HUMIRA *CF*) 40 MG/0.4ML prefilled syringe kit Inject 0.4 mLs (40 mg) subcutaneously every 14 days. 1 each 10     albuterol (PROAIR HFA/PROVENTIL HFA/VENTOLIN HFA) 108 (90 Base) MCG/ACT inhaler Inhale 1-2 puffs into the lungs every 4 hours as needed for shortness of breath or wheezing. 18 g 3     aspirin 81 MG EC tablet Take 81 mg by mouth daily.       atorvastatin (LIPITOR) 40 MG tablet Take 1 tablet (40 mg) by mouth at bedtime. 90 tablet 3     ipratropium - albuterol 0.5 mg/2.5 mg/3 mL (DUONEB) 0.5-2.5 (3) MG/3ML neb solution Take 1 vial (3 mLs) by nebulization every 6 hours as needed for shortness of breath. 30 mL 3     methotrexate 2.5 MG tablet Take 10 tablets (25 mg) by mouth every 7 days. 120 tablet 3     Multiple  Vitamins-Minerals (MULTIVITAMIN ADULTS 50+ PO) Take 2 tablets by mouth daily. (Contains 800 mcg folic acid)       vitamin B complex with vitamin C (VITAMIN  B COMPLEX) tablet Take 2 tablets by mouth daily. (Contains 800 mcg folic acid in the two tablets)       vitamin C (ASCORBIC ACID) 500 MG tablet Take 1,000 mg by mouth daily. (Also contains komal hips)       vitamin E 400 units TABS Take 400 Units by mouth daily.       No current facility-administered medications for this visit.      Past Medical History:   Patient Active Problem List   Diagnosis     Asthma, mild intermittent     Hypertriglyceridemia     Plantar fasciitis     Class 3 severe obesity with body mass index (BMI) of 40.0 to 44.9 in adult, unspecified obesity type, unspecified whether serious comorbidity present (H)     Pes planus of both feet     RA (rheumatoid arthritis) (H)     TIA (transient ischemic attack)     Elevated blood sugar     Past Medical History:   Diagnosis Date     Arthritis     RA     Asthma, mild intermittent      Obesity 1/30/2014     Plantar fasciitis 1/30/2014       CC Edie Sanchez, APRN CNP  [address] [place] [address] on close of this encounter.       Again, thank you for allowing me to participate in the care of your patient.        Sincerely,        Fanta Jameson PA-C    Electronically signed

## 2025-04-17 NOTE — PROGRESS NOTES
[facility] Dermatology Note  Encounter Date: Apr 17, 2025  Office Visit     Reviewed patients past medical history and pertinent chart review prior to patients visit today.     Dermatology Problem List:  # NUB, right oral commissure, shave biopsy 4/17/2025     Social history: RN  ____________________________________________    Assessment & Plan:     # Neoplasm of uncertain behavior:  right oral commissure  DDx includes pyogenic granuloma vs BCC vs SCC. Shave biopsy today.    Procedure Note: Biopsy by shave technique  The risks and benefits of the procedure were described to the patient. These include but are not limited to bleeding, infection, scar, incomplete removal, and non-diagnostic biopsy. Verbal informed consent was obtained. The above site(s) was cleansed with an alcohol pad and injected with 1% lidocaine with epinephrine. Once anesthesia was obtained, a biopsy(ies) was performed with Gilette blade. The tissue(s) was placed in a labeled container(s) with formalin and sent to pathology. Hemostasis was achieved with aluminum chloride. Vaseline and a bandage were applied to the wound(s). The patient tolerated the procedure well and was given post biopsy care instructions.    # Seborrheic keratosis, left flank  # Acrochordon, left posterior thigh  - We discussed the benign nature of the skin lesions. No treatment is required. I recommend continued observation with follow up should any concerning changes arise.     Follow up as needed.     All risks, benefits and alternatives were discussed with patient.  Patient is in agreement and understands the assessment and plan.  All questions were answered.  Fanta Jameson PA-C  [facility] Dermatology  _______________________________________    CC: Derm Problem (1.Little red irritating bump R corner of mouth/2.  Also spot on L chest)    HPI:  Mr. Gonzales Melo is a(n) 54 year old male who presents today as a new patient for three concerns.   First, for years he has noticed a lesion on the right corner of the mouth.  The lesion often scabs and bleeds.  Second, for years he has noticed a lesion on the left flank.  No growth or change over time.  Lesion is asymptomatic.  Finally, he notes a skin tag involving the left posterior thigh.  No personal or family history of skin cancer.  Patient is otherwise feeling well, without additional skin concerns.      Physical Exam:  SKIN: Focused examination of right oral commissure, left flank, and left posterior thigh was performed.  - The right oral commissure demonstrates a pink papule.   - Waxy, stuck on appearing papule involving the left flank, consistent with seborrheic keratosis.   - The left posterior thigh demonstrates a fleshy, pedunculated papule, consistent with an acrochordon.     - No other lesions of concern on areas examined.     Medications:  Current Outpatient Medications   Medication Sig Dispense Refill    adalimumab (HUMIRA *CF*) 40 MG/0.4ML prefilled syringe kit Inject 0.4 mLs (40 mg) subcutaneously every 14 days. 1 each 10    albuterol (PROAIR HFA/PROVENTIL HFA/VENTOLIN HFA) 108 (90 Base) MCG/ACT inhaler Inhale 1-2 puffs into the lungs every 4 hours as needed for shortness of breath or wheezing. 18 g 3    aspirin 81 MG EC tablet Take 81 mg by mouth daily.      atorvastatin (LIPITOR) 40 MG tablet Take 1 tablet (40 mg) by mouth at bedtime. 90 tablet 3    ipratropium - albuterol 0.5 mg/2.5 mg/3 mL (DUONEB) 0.5-2.5 (3) MG/3ML neb solution Take 1 vial (3 mLs) by nebulization every 6 hours as needed for shortness of breath. 30 mL 3    methotrexate 2.5 MG tablet Take 10 tablets (25 mg) by mouth every 7 days. 120 tablet 3    Multiple Vitamins-Minerals (MULTIVITAMIN ADULTS 50+ PO) Take 2 tablets by mouth daily. (Contains 800 mcg folic acid)      vitamin B complex with vitamin C (VITAMIN  B COMPLEX) tablet Take 2 tablets by mouth daily. (Contains 800 mcg folic acid in the two tablets)      vitamin C  (ASCORBIC ACID) 500 MG tablet Take 1,000 mg by mouth daily. (Also contains komal hips)      vitamin E 400 units TABS Take 400 Units by mouth daily.       No current facility-administered medications for this visit.      Past Medical History:   Patient Active Problem List   Diagnosis    Asthma, mild intermittent    Hypertriglyceridemia    Plantar fasciitis    Class 3 severe obesity with body mass index (BMI) of 40.0 to 44.9 in adult, unspecified obesity type, unspecified whether serious comorbidity present (H)    Pes planus of both feet    RA (rheumatoid arthritis) (H)    TIA (transient ischemic attack)    Elevated blood sugar     Past Medical History:   Diagnosis Date    Arthritis     RA    Asthma, mild intermittent     Obesity 1/30/2014    Plantar fasciitis 1/30/2014       CC JED Zhou CNP  [address] [place] [address] on close of this encounter.

## 2025-04-20 LAB
PATH REPORT.COMMENTS IMP SPEC: NORMAL
PATH REPORT.FINAL DX SPEC: NORMAL
PATH REPORT.GROSS SPEC: NORMAL
PATH REPORT.MICROSCOPIC SPEC OTHER STN: NORMAL
PATH REPORT.RELEVANT HX SPEC: NORMAL

## 2025-05-22 NOTE — TELEPHONE ENCOUNTER
Called patient and left a VM to schedule Re-Evaluation with Dr. Myrick.  Offered dates on 6/6/25 or 6/20/25 at [facility] and 6/10/25, 6/13/25, or 6/17/25 at [facility].  Provided contact information and requested that patient return my call to schedule.   Called patient and left message to call [facility] Nancy [phone] or [facility] Juan Manuel [phone] when ready to schedule new CPAP setup appointment.

## 2025-06-23 ENCOUNTER — LAB (OUTPATIENT)
Dept: LAB | Facility: CLINIC | Age: 55
End: 2025-06-23
Payer: COMMERCIAL

## 2025-06-23 DIAGNOSIS — R76.8 RHEUMATOID FACTOR POSITIVE WITH CYCLIC CITRULLINATED PEPTIDE (CCP) ANTIBODY NEGATIVE: ICD-10-CM

## 2025-06-23 LAB
ALBUMIN SERPL BCG-MCNC: 4 G/DL (ref 3.5–5.2)
ALT SERPL W P-5'-P-CCNC: 40 U/L (ref 0–70)
AST SERPL W P-5'-P-CCNC: 26 U/L (ref 0–45)
CREAT SERPL-MCNC: 0.88 MG/DL (ref 0.67–1.17)
CRP SERPL-MCNC: <3 MG/L
EGFRCR SERPLBLD CKD-EPI 2021: >90 ML/MIN/1.73M2
ERYTHROCYTE [DISTWIDTH] IN BLOOD BY AUTOMATED COUNT: 13.6 % (ref 10–15)
ERYTHROCYTE [SEDIMENTATION RATE] IN BLOOD BY WESTERGREN METHOD: 3 MM/HR (ref 0–20)
HCT VFR BLD AUTO: 43.3 % (ref 40–53)
HGB BLD-MCNC: 14.4 G/DL (ref 13.3–17.7)
MCH RBC QN AUTO: 31.4 PG (ref 26.5–33)
MCHC RBC AUTO-ENTMCNC: 33.3 G/DL (ref 31.5–36.5)
MCV RBC AUTO: 95 FL (ref 78–100)
PLATELET # BLD AUTO: 280 10E3/UL (ref 150–450)
RBC # BLD AUTO: 4.58 10E6/UL (ref 4.4–5.9)
WBC # BLD AUTO: 7.6 10E3/UL (ref 4–11)

## 2025-06-23 PROCEDURE — 84460 ALANINE AMINO (ALT) (SGPT): CPT

## 2025-06-23 PROCEDURE — 82565 ASSAY OF CREATININE: CPT

## 2025-06-23 PROCEDURE — 36415 COLL VENOUS BLD VENIPUNCTURE: CPT

## 2025-06-23 PROCEDURE — 84450 TRANSFERASE (AST) (SGOT): CPT

## 2025-06-23 PROCEDURE — 86140 C-REACTIVE PROTEIN: CPT

## 2025-06-23 PROCEDURE — 82040 ASSAY OF SERUM ALBUMIN: CPT

## 2025-06-23 PROCEDURE — 85027 COMPLETE CBC AUTOMATED: CPT

## 2025-06-23 PROCEDURE — 85652 RBC SED RATE AUTOMATED: CPT

## 2025-08-07 ENCOUNTER — TELEPHONE (OUTPATIENT)
Dept: GASTROENTEROLOGY | Facility: CLINIC | Age: 55
End: 2025-08-07
Payer: COMMERCIAL

## 2025-08-27 ENCOUNTER — OFFICE VISIT (OUTPATIENT)
Dept: RHEUMATOLOGY | Facility: CLINIC | Age: 55
End: 2025-08-27
Payer: COMMERCIAL

## 2025-08-27 VITALS
OXYGEN SATURATION: 97 % | SYSTOLIC BLOOD PRESSURE: 116 MMHG | BODY MASS INDEX: 44.91 KG/M2 | WEIGHT: 315 LBS | DIASTOLIC BLOOD PRESSURE: 77 MMHG | HEART RATE: 82 BPM

## 2025-08-27 DIAGNOSIS — R76.8 RHEUMATOID FACTOR POSITIVE WITH CYCLIC CITRULLINATED PEPTIDE (CCP) ANTIBODY NEGATIVE: ICD-10-CM

## 2025-08-27 PROCEDURE — 1125F AMNT PAIN NOTED PAIN PRSNT: CPT | Performed by: INTERNAL MEDICINE

## 2025-08-27 PROCEDURE — 3074F SYST BP LT 130 MM HG: CPT | Performed by: INTERNAL MEDICINE

## 2025-08-27 PROCEDURE — 3078F DIAST BP <80 MM HG: CPT | Performed by: INTERNAL MEDICINE

## 2025-08-27 PROCEDURE — G2211 COMPLEX E/M VISIT ADD ON: HCPCS | Performed by: INTERNAL MEDICINE

## 2025-08-27 PROCEDURE — 99214 OFFICE O/P EST MOD 30 MIN: CPT | Performed by: INTERNAL MEDICINE

## 2025-08-27 RX ORDER — METHOTREXATE 2.5 MG/1
25 TABLET ORAL
Qty: 120 TABLET | Refills: 3 | Status: SHIPPED | OUTPATIENT
Start: 2025-08-27

## 2025-08-27 RX ORDER — ADALIMUMAB 40MG/0.4ML
40 KIT SUBCUTANEOUS
Qty: 1 EACH | Refills: 10 | Status: SHIPPED | OUTPATIENT
Start: 2025-08-27

## 2025-08-27 ASSESSMENT — PAIN SCALES - GENERAL: PAINLEVEL_OUTOF10: MILD PAIN (1)
